# Patient Record
Sex: FEMALE | Race: ASIAN | HISPANIC OR LATINO | Employment: FULL TIME | URBAN - METROPOLITAN AREA
[De-identification: names, ages, dates, MRNs, and addresses within clinical notes are randomized per-mention and may not be internally consistent; named-entity substitution may affect disease eponyms.]

---

## 2017-02-16 ENCOUNTER — TELEPHONE (OUTPATIENT)
Dept: VASCULAR LAB | Facility: MEDICAL CENTER | Age: 27
End: 2017-02-16

## 2017-02-16 NOTE — TELEPHONE ENCOUNTER
[facility]    Left a voicemail to remind pt to obtain next INR.    Taj Allen, PHARMD

## 2017-03-02 DIAGNOSIS — I48.91 ATRIAL FIBRILLATION, UNSPECIFIED TYPE (HCC): ICD-10-CM

## 2017-03-04 ENCOUNTER — HOSPITAL ENCOUNTER (OUTPATIENT)
Dept: LAB | Facility: MEDICAL CENTER | Age: 27
End: 2017-03-04
Attending: FAMILY MEDICINE
Payer: COMMERCIAL

## 2017-03-04 ENCOUNTER — HOSPITAL ENCOUNTER (OUTPATIENT)
Dept: LAB | Facility: MEDICAL CENTER | Age: 27
End: 2017-03-04
Attending: NURSE PRACTITIONER
Payer: COMMERCIAL

## 2017-03-04 LAB
25(OH)D3 SERPL-MCNC: 18 NG/ML (ref 30–100)
ALBUMIN SERPL BCP-MCNC: 4.2 G/DL (ref 3.2–4.9)
ALBUMIN/GLOB SERPL: 0.9 G/DL
ALP SERPL-CCNC: 83 U/L (ref 30–99)
ALT SERPL-CCNC: 18 U/L (ref 2–50)
ANION GAP SERPL CALC-SCNC: 7 MMOL/L (ref 0–11.9)
AST SERPL-CCNC: 25 U/L (ref 12–45)
BILIRUB SERPL-MCNC: 0.7 MG/DL (ref 0.1–1.5)
BUN SERPL-MCNC: 6 MG/DL (ref 8–22)
CALCIUM SERPL-MCNC: 9.7 MG/DL (ref 8.5–10.5)
CHLORIDE SERPL-SCNC: 104 MMOL/L (ref 96–112)
CO2 SERPL-SCNC: 26 MMOL/L (ref 20–33)
CREAT SERPL-MCNC: 0.66 MG/DL (ref 0.5–1.4)
ERYTHROCYTE [DISTWIDTH] IN BLOOD BY AUTOMATED COUNT: 45.5 FL (ref 35.9–50)
EST. AVERAGE GLUCOSE BLD GHB EST-MCNC: 114 MG/DL
GLOBULIN SER CALC-MCNC: 4.7 G/DL (ref 1.9–3.5)
GLUCOSE SERPL-MCNC: 82 MG/DL (ref 65–99)
HBA1C MFR BLD: 5.6 % (ref 0–5.6)
HCT VFR BLD AUTO: 43.1 % (ref 37–47)
HGB BLD-MCNC: 13.6 G/DL (ref 12–16)
MCH RBC QN AUTO: 28.9 PG (ref 27–33)
MCHC RBC AUTO-ENTMCNC: 31.6 G/DL (ref 33.6–35)
MCV RBC AUTO: 91.7 FL (ref 81.4–97.8)
PLATELET # BLD AUTO: 284 K/UL (ref 164–446)
PMV BLD AUTO: 10.6 FL (ref 9–12.9)
POTASSIUM SERPL-SCNC: 3.9 MMOL/L (ref 3.6–5.5)
PROT SERPL-MCNC: 8.9 G/DL (ref 6–8.2)
RBC # BLD AUTO: 4.7 M/UL (ref 4.2–5.4)
SODIUM SERPL-SCNC: 137 MMOL/L (ref 135–145)
TSH SERPL DL<=0.005 MIU/L-ACNC: 1.05 UIU/ML (ref 0.3–3.7)
WBC # BLD AUTO: 5.2 K/UL (ref 4.8–10.8)

## 2017-03-04 PROCEDURE — 83695 ASSAY OF LIPOPROTEIN(A): CPT

## 2017-03-04 PROCEDURE — 36415 COLL VENOUS BLD VENIPUNCTURE: CPT

## 2017-03-04 PROCEDURE — 83704 LIPOPROTEIN BLD QUAN PART: CPT

## 2017-03-04 PROCEDURE — 80053 COMPREHEN METABOLIC PANEL: CPT

## 2017-03-04 PROCEDURE — 83036 HEMOGLOBIN GLYCOSYLATED A1C: CPT

## 2017-03-04 PROCEDURE — 84443 ASSAY THYROID STIM HORMONE: CPT

## 2017-03-04 PROCEDURE — 85027 COMPLETE CBC AUTOMATED: CPT

## 2017-03-04 PROCEDURE — 82306 VITAMIN D 25 HYDROXY: CPT

## 2017-03-04 PROCEDURE — 80061 LIPID PANEL: CPT

## 2017-03-06 LAB — LPA SERPL-MCNC: 149 MG/DL

## 2017-03-08 LAB
CHOLEST SERPL-MCNC: 164 MG/DL (ref 100–199)
HDL PARTICAL NO Q4363: 25.1 UMOL/L
HDL SERPL QN: 9.4 NM
HDLC SERPL-MCNC: 46 MG/DL
HLD.LARGE SERPL-SCNC: 6.4 UMOL/L
LDL MED SERPL QN: 22 NM
LDL SERPL QN: 22 NM
LDL SERPL-SCNC: 975 NMOL/L
LDL SMALL SERPL-SCNC: 155 NMOL/L
LDL SMALL SERPL-SCNC: 155 NMOL/L
LDLC SERPL CALC-MCNC: 102 MG/DL (ref 0–99)
LP IR SCORE Q4364: 26
TRIGL SERPL-MCNC: 78 MG/DL (ref 0–149)
VLDL LARGE SERPL-SCNC: 2.2 NMOL/L
VLDL SERPL QN: 42.1 NM

## 2017-03-09 ENCOUNTER — TELEPHONE (OUTPATIENT)
Dept: VASCULAR LAB | Facility: MEDICAL CENTER | Age: 27
End: 2017-03-09

## 2017-03-10 ENCOUNTER — HOSPITAL ENCOUNTER (OUTPATIENT)
Dept: LAB | Facility: MEDICAL CENTER | Age: 27
End: 2017-03-10
Attending: NURSE PRACTITIONER
Payer: COMMERCIAL

## 2017-03-10 DIAGNOSIS — I48.0 PAROXYSMAL ATRIAL FIBRILLATION (HCC): ICD-10-CM

## 2017-03-10 LAB
INR PPP: 3.55 (ref 0.87–1.13)
PROTHROMBIN TIME: 36.6 SEC (ref 12–14.6)

## 2017-03-10 PROCEDURE — 85610 PROTHROMBIN TIME: CPT

## 2017-03-10 PROCEDURE — 36415 COLL VENOUS BLD VENIPUNCTURE: CPT

## 2017-04-22 ENCOUNTER — HOSPITAL ENCOUNTER (OUTPATIENT)
Dept: LAB | Facility: MEDICAL CENTER | Age: 27
End: 2017-04-22
Attending: NURSE PRACTITIONER
Payer: COMMERCIAL

## 2017-04-22 DIAGNOSIS — I48.0 PAROXYSMAL ATRIAL FIBRILLATION (HCC): ICD-10-CM

## 2017-04-22 LAB
INR PPP: 3.7 (ref 0.87–1.13)
PROTHROMBIN TIME: 37.8 SEC (ref 12–14.6)

## 2017-04-22 PROCEDURE — 85610 PROTHROMBIN TIME: CPT

## 2017-04-22 PROCEDURE — 36415 COLL VENOUS BLD VENIPUNCTURE: CPT

## 2017-04-24 ENCOUNTER — ANTICOAGULATION MONITORING (OUTPATIENT)
Dept: VASCULAR LAB | Facility: MEDICAL CENTER | Age: 27
End: 2017-04-24

## 2017-04-24 DIAGNOSIS — I48.0 PAROXYSMAL ATRIAL FIBRILLATION (HCC): ICD-10-CM

## 2017-04-24 NOTE — PROGRESS NOTES
Anticoagulation Summary as of 4/24/2017     INR goal 2.5-3.5   Selected INR 3.70! (4/22/2017)   Maintenance plan 2.5 mg (5 mg x 0.5) on Mon, Wed, Sat; 5 mg (5 mg x 1) all other days   Weekly total 27.5 mg   Plan last modified Taj Allen, PHARMD (4/24/2017)   Next INR check 5/8/2017   Target end date Indefinite    Indications   Paroxysmal atrial fibrillation (CMS-HCC) [I48.0]  Mitral stenosis s/p Mechanical MVR [I05.0]         Anticoagulation Episode Summary     INR check location Outside Lab    Preferred lab     Send INR reminders to     [facility]       Anticoagulation Care Providers     Provider Role Specialty Phone number    Bird Rodriguez D.O. Referring Family Medicine [phone]    Trey Dubon M.D. Referring Cardiac Surgery [phone]    Taj Allen, PHARMD Responsible          Anticoagulation Patient Findings    Left voicemail message to report a SUPRA therapeutic INR of 3.7.  Pt to reduce current warfarin dosing regimen. Requested pt contact the clinic for any s/s of unusual bleeding, bruising, clotting or any changes to diet or medication. FU 2 weeks.    Taj Allen, PHARMD

## 2017-05-06 ENCOUNTER — HOSPITAL ENCOUNTER (OUTPATIENT)
Dept: LAB | Facility: MEDICAL CENTER | Age: 27
End: 2017-05-06
Attending: NURSE PRACTITIONER
Payer: COMMERCIAL

## 2017-05-06 ENCOUNTER — APPOINTMENT (OUTPATIENT)
Dept: LAB | Facility: MEDICAL CENTER | Age: 27
End: 2017-05-06
Payer: COMMERCIAL

## 2017-05-06 DIAGNOSIS — I48.0 PAROXYSMAL ATRIAL FIBRILLATION (HCC): ICD-10-CM

## 2017-05-06 LAB
INR PPP: 2.28 (ref 0.87–1.13)
INR PPP: 2.28 (ref 2–3.5)
PROTHROMBIN TIME: 25.8 SEC (ref 12–14.6)

## 2017-05-06 PROCEDURE — 85610 PROTHROMBIN TIME: CPT

## 2017-05-06 PROCEDURE — 36415 COLL VENOUS BLD VENIPUNCTURE: CPT

## 2017-05-09 ENCOUNTER — ANTICOAGULATION MONITORING (OUTPATIENT)
Dept: VASCULAR LAB | Facility: MEDICAL CENTER | Age: 27
End: 2017-05-09

## 2017-05-09 DIAGNOSIS — I34.2 NON-RHEUMATIC MITRAL VALVE STENOSIS: ICD-10-CM

## 2017-05-09 DIAGNOSIS — I48.0 PAROXYSMAL ATRIAL FIBRILLATION (HCC): ICD-10-CM

## 2017-05-09 NOTE — PROGRESS NOTES
Anticoagulation Summary as of 5/9/2017     INR goal 2.5-3.5   Selected INR 2.28! (5/6/2017)   Maintenance plan 2.5 mg (5 mg x 0.5) on Mon, Wed, Sat; 5 mg (5 mg x 1) all other days   Weekly total 27.5 mg   Plan last modified Taj Allen, PHARMD (4/24/2017)   Next INR check    Target end date Indefinite    Indications   Paroxysmal atrial fibrillation (CMS-HCC) [I48.0]  Mitral stenosis s/p Mechanical MVR [I05.0]         Anticoagulation Episode Summary     INR check location Outside Lab    Preferred lab     Send INR reminders to     [facility]       Anticoagulation Care Providers     Provider Role Specialty Phone number    Bird Rodriguez D.O. Referring Family Medicine [phone]    Trey Dubon M.D. Referring Cardiac Surgery [phone]    Taj Allen, PHARMD Responsible          Anticoagulation Patient Findings    Unable to leave voicemail dosing instructions - the voicemailbox is full  Nina Membreno, ILANA

## 2017-05-11 ENCOUNTER — ANTICOAGULATION MONITORING (OUTPATIENT)
Dept: VASCULAR LAB | Facility: MEDICAL CENTER | Age: 27
End: 2017-05-11

## 2017-05-11 DIAGNOSIS — I48.0 PAF (PAROXYSMAL ATRIAL FIBRILLATION) (HCC): ICD-10-CM

## 2017-05-11 DIAGNOSIS — I48.0 PAROXYSMAL ATRIAL FIBRILLATION (HCC): ICD-10-CM

## 2017-05-11 DIAGNOSIS — I05.0 RHEUMATIC MITRAL STENOSIS: ICD-10-CM

## 2017-05-11 RX ORDER — METOPROLOL SUCCINATE 25 MG/1
25 TABLET, EXTENDED RELEASE ORAL
Qty: 30 TAB | Refills: 11 | Status: SHIPPED | OUTPATIENT
Start: 2017-05-11 | End: 2017-08-18 | Stop reason: SDUPTHER

## 2017-05-11 NOTE — PROGRESS NOTES
Anticoagulation Summary as of 5/11/2017     INR goal 2.5-3.5   Selected INR 2.28! (5/6/2017)   Maintenance plan 2.5 mg (5 mg x 0.5) on Mon, Wed, Sat; 5 mg (5 mg x 1) all other days   Weekly total 27.5 mg   Plan last modified Taj Allen, PHARMD (4/24/2017)   Next INR check 5/25/2017   Target end date Indefinite    Indications   Paroxysmal atrial fibrillation (CMS-HCC) [I48.0]  Mitral stenosis s/p Mechanical MVR [I05.0]         Anticoagulation Episode Summary     INR check location Outside Lab    Preferred lab     Send INR reminders to     [facility]       Anticoagulation Care Providers     Provider Role Specialty Phone number    Bird Rodriguez D.O. Referring Family Medicine [phone]    Trey Dubon M.D. Referring Cardiac Surgery [phone]    Taj Allen, PHARMD Responsible          Anticoagulation Patient Findings    Spoke to patient on phone. No current signs of bleeding. No interval medication changes. Increase dose of warfarin to 7.5 mg today only. Follow up INR in 2 weeks.    Mohan Menon, PHARMD

## 2017-06-01 ENCOUNTER — TELEPHONE (OUTPATIENT)
Dept: VASCULAR LAB | Facility: MEDICAL CENTER | Age: 27
End: 2017-06-01

## 2017-06-14 ENCOUNTER — TELEPHONE (OUTPATIENT)
Dept: VASCULAR LAB | Facility: MEDICAL CENTER | Age: 27
End: 2017-06-14

## 2017-06-14 NOTE — TELEPHONE ENCOUNTER
[facility]    Pt states she will obtain INR at earliest convenience.    Taj Allen, PHARMD

## 2017-06-16 ENCOUNTER — HOSPITAL ENCOUNTER (OUTPATIENT)
Dept: LAB | Facility: MEDICAL CENTER | Age: 27
End: 2017-06-16
Attending: NURSE PRACTITIONER
Payer: COMMERCIAL

## 2017-06-16 DIAGNOSIS — I48.0 PAROXYSMAL ATRIAL FIBRILLATION (HCC): ICD-10-CM

## 2017-06-16 LAB
INR PPP: 3 (ref 0.87–1.13)
PROTHROMBIN TIME: 32.1 SEC (ref 12–14.6)

## 2017-06-16 PROCEDURE — 36415 COLL VENOUS BLD VENIPUNCTURE: CPT

## 2017-06-16 PROCEDURE — 85610 PROTHROMBIN TIME: CPT

## 2017-06-19 ENCOUNTER — ANTICOAGULATION MONITORING (OUTPATIENT)
Dept: VASCULAR LAB | Facility: MEDICAL CENTER | Age: 27
End: 2017-06-19

## 2017-06-19 DIAGNOSIS — I48.0 PAROXYSMAL ATRIAL FIBRILLATION (HCC): ICD-10-CM

## 2017-06-19 DIAGNOSIS — I05.0 RHEUMATIC MITRAL STENOSIS: ICD-10-CM

## 2017-06-19 NOTE — PROGRESS NOTES
OP Anticoagulation Telephone Note    Date: 6/19/2017  Anticoagulation Summary as of 6/19/2017     INR goal 2.5-3.5   Selected INR 3.00 (6/16/2017)   Maintenance plan 2.5 mg (5 mg x 0.5) on Mon, Wed, Sat; 5 mg (5 mg x 1) all other days   Weekly total 27.5 mg   No change documented Ratna Gutierrez, Med Ass't   Plan last modified JL SantosD (4/24/2017)   Next INR check 6/30/2017   Target end date Indefinite    Indications   Paroxysmal atrial fibrillation (CMS-[facility]) [I48.0]  Mitral stenosis s/p Mechanical MVR [I05.0]         Anticoagulation Episode Summary     INR check location Outside Lab    Preferred lab     Send INR reminders to     [facility]       Anticoagulation Care Providers     Provider Role Specialty Phone number    Bird Rodriguez D.O. Referring Family Medicine [phone]    Trey Dubon M.D. Referring Cardiac Surgery [phone]    JL SantosD Responsible          Anticoagulation Patient Findings   Negatives Missed Doses, Extra Doses, Medication Changes, Antibiotic Use, Diet Changes, Dental/Other Procedures, Hospitalization, Bleeding Gums, Nose Bleeds, Blood in Urine, Blood in Stool, Any Bruising, Other Complaints      Plan:  Left message on patient's answering machine/ voicemail. Instructed patient to call back with any concerns regarding any unusual bleeding or bruising, any medication or diet changes, or any signs or symptoms of thrombosis. Instructed patient to resume medication as outlined above. Patient to follow up in 2 weeks.     Ratna Gutierrez, Medical Assistant    I have reviewed and agree with the plan above on 06/21/2017      Nina Membreno, JLD

## 2017-06-30 DIAGNOSIS — E78.00 HYPERCHOLESTEROLEMIA: ICD-10-CM

## 2017-06-30 RX ORDER — ATORVASTATIN CALCIUM 40 MG/1
40 TABLET, FILM COATED ORAL EVERY EVENING
Qty: 30 TAB | Refills: 1 | OUTPATIENT
Start: 2017-06-30 | End: 2017-08-18 | Stop reason: SDUPTHER

## 2017-07-13 ENCOUNTER — HOSPITAL ENCOUNTER (OUTPATIENT)
Dept: LAB | Facility: MEDICAL CENTER | Age: 27
End: 2017-07-13
Attending: NURSE PRACTITIONER
Payer: COMMERCIAL

## 2017-07-13 LAB
INR PPP: 2.29 (ref 0.87–1.13)
PROTHROMBIN TIME: 25.9 SEC (ref 12–14.6)

## 2017-07-13 PROCEDURE — 85610 PROTHROMBIN TIME: CPT

## 2017-07-13 PROCEDURE — 36415 COLL VENOUS BLD VENIPUNCTURE: CPT

## 2017-07-14 ENCOUNTER — ANTICOAGULATION MONITORING (OUTPATIENT)
Dept: VASCULAR LAB | Facility: MEDICAL CENTER | Age: 27
End: 2017-07-14

## 2017-07-14 ENCOUNTER — TELEPHONE (OUTPATIENT)
Dept: VASCULAR LAB | Facility: MEDICAL CENTER | Age: 27
End: 2017-07-14

## 2017-07-14 DIAGNOSIS — I48.0 PAROXYSMAL ATRIAL FIBRILLATION (HCC): ICD-10-CM

## 2017-07-14 NOTE — PROGRESS NOTES
Anticoagulation Summary as of 7/14/2017     INR goal 2.5-3.5   Selected INR 2.29! (7/13/2017)   Maintenance plan 2.5 mg (5 mg x 0.5) on Mon, Wed, Sat; 5 mg (5 mg x 1) all other days   Weekly total 27.5 mg   Plan last modified Taj Allen, PHARMD (4/24/2017)   Next INR check 7/27/2017   Target end date Indefinite    Indications   Paroxysmal atrial fibrillation (CMS-HCC) [I48.0]  Mitral stenosis s/p Mechanical MVR [I05.0]         Anticoagulation Episode Summary     INR check location Outside Lab    Preferred lab     Send INR reminders to     [facility]       Anticoagulation Care Providers     Provider Role Specialty Phone number    Bird Rodriguez D.O. Referring Family Medicine [phone]    Trey Dubon M.D. Referring Cardiac Surgery [phone]    Taj Allen, PHARMD Responsible          Anticoagulation Patient Findings    Left voicemail message to report a SUB therapeutic INR of 2.3.  Pt to bolus today then continue with current warfarin dosing regimen. Requested pt contact the clinic for any s/s of unusual bleeding, bruising, clotting or any changes to diet or medication. FU 2 weeks.    Taj Allen, PHARMD

## 2017-07-24 ENCOUNTER — HOSPITAL ENCOUNTER (OUTPATIENT)
Dept: LAB | Facility: MEDICAL CENTER | Age: 27
End: 2017-07-24
Attending: NURSE PRACTITIONER
Payer: COMMERCIAL

## 2017-07-24 DIAGNOSIS — I48.0 PAROXYSMAL ATRIAL FIBRILLATION (HCC): ICD-10-CM

## 2017-07-24 LAB
INR PPP: 2.1 (ref 0.87–1.13)
INR PPP: 2.1 (ref 2–3.5)
PROTHROMBIN TIME: 24.2 SEC (ref 12–14.6)

## 2017-07-24 PROCEDURE — 85610 PROTHROMBIN TIME: CPT

## 2017-07-24 PROCEDURE — 36415 COLL VENOUS BLD VENIPUNCTURE: CPT

## 2017-07-25 ENCOUNTER — ANTICOAGULATION MONITORING (OUTPATIENT)
Dept: VASCULAR LAB | Facility: MEDICAL CENTER | Age: 27
End: 2017-07-25

## 2017-07-25 DIAGNOSIS — I48.0 PAROXYSMAL ATRIAL FIBRILLATION (HCC): ICD-10-CM

## 2017-07-25 DIAGNOSIS — I05.0 MITRAL VALVE STENOSIS, UNSPECIFIED ETIOLOGY: ICD-10-CM

## 2017-07-25 NOTE — PROGRESS NOTES
Anticoagulation Summary as of 7/25/2017     INR goal 2.5-3.5   Selected INR 2.10! (7/24/2017)   Maintenance plan 2.5 mg (5 mg x 0.5) on Mon, Wed; 5 mg (5 mg x 1) all other days   Weekly total 30 mg   Plan last modified Onelia Pham (7/25/2017)   Next INR check 8/4/2017   Target end date Indefinite    Indications   Paroxysmal atrial fibrillation (CMS-HCC) [I48.0]  Mitral stenosis s/p Mechanical MVR [I05.0]         Anticoagulation Episode Summary     INR check location Outside Lab    Preferred lab     Send INR reminders to     [facility]       Anticoagulation Care Providers     Provider Role Specialty Phone number    Bird Rodriguez D.O. Referring Family Medicine [phone]    Trey Dubon M.D. Referring Cardiac Surgery [phone]    Taj Allen, PHARMD Responsible          Anticoagulation Patient Findings   Positives Diet Changes    Negatives Missed Doses, Extra Doses, Medication Changes, Antibiotic Use, Dental/Other Procedures, Hospitalization, Bleeding Gums, Nose Bleeds, Blood in Urine, Blood in Stool, Any Bruising, Other Complaints    Comments Patient trying to eat healthier        Spoke with the patient on the phone today, reporting a SUB-therapeutic INR of 2.10. Confirmed the current warfarin dosing regimen and patient compliance. Patient has been trying to eat healthier, which includes more salad and veggies. Patient denies any interval changes to her other medications & denies any signs/symptoms of bleeding or clotting.  Instructed the patient to take an additional 2.5mg along with her normal dose (for a total of 7.5mg) TONIGHT ONLY, then to begin increased weekly regimen (~9%) of 2.5mg on Mon & Wed and 5mg ROW.   Patient will follow up in about 10 days (per request).    Elizabeth Pham  PharmD

## 2017-07-26 ENCOUNTER — ANTICOAGULATION MONITORING (OUTPATIENT)
Dept: VASCULAR LAB | Facility: MEDICAL CENTER | Age: 27
End: 2017-07-26

## 2017-07-26 DIAGNOSIS — I05.0 MITRAL VALVE STENOSIS, UNSPECIFIED ETIOLOGY: ICD-10-CM

## 2017-07-26 DIAGNOSIS — I48.0 PAROXYSMAL ATRIAL FIBRILLATION (HCC): ICD-10-CM

## 2017-07-26 LAB — INR PPP: 2.1 (ref 2–3.5)

## 2017-08-04 ENCOUNTER — HOSPITAL ENCOUNTER (OUTPATIENT)
Dept: LAB | Facility: MEDICAL CENTER | Age: 27
End: 2017-08-04
Attending: NURSE PRACTITIONER
Payer: COMMERCIAL

## 2017-08-04 LAB
INR PPP: 1.65 (ref 0.87–1.13)
PROTHROMBIN TIME: 20 SEC (ref 12–14.6)

## 2017-08-04 PROCEDURE — 85610 PROTHROMBIN TIME: CPT

## 2017-08-04 PROCEDURE — 36415 COLL VENOUS BLD VENIPUNCTURE: CPT

## 2017-08-07 ENCOUNTER — ANTICOAGULATION MONITORING (OUTPATIENT)
Dept: VASCULAR LAB | Facility: MEDICAL CENTER | Age: 27
End: 2017-08-07

## 2017-08-07 ENCOUNTER — TELEPHONE (OUTPATIENT)
Dept: MEDICAL GROUP | Facility: MEDICAL CENTER | Age: 27
End: 2017-08-07

## 2017-08-07 DIAGNOSIS — R73.01 IMPAIRED FASTING GLUCOSE: ICD-10-CM

## 2017-08-07 DIAGNOSIS — E55.9 VITAMIN D DEFICIENCY: ICD-10-CM

## 2017-08-07 DIAGNOSIS — E78.5 HYPERLIPIDEMIA LDL GOAL <70: ICD-10-CM

## 2017-08-07 DIAGNOSIS — Z95.0 PACEMAKER: ICD-10-CM

## 2017-08-07 DIAGNOSIS — R77.1 ABNORMALITY OF GLOBULIN: ICD-10-CM

## 2017-08-07 DIAGNOSIS — I05.0 MITRAL VALVE STENOSIS, UNSPECIFIED ETIOLOGY: ICD-10-CM

## 2017-08-07 DIAGNOSIS — E78.00 HYPERCHOLESTEROLEMIA: ICD-10-CM

## 2017-08-07 DIAGNOSIS — I48.0 PAROXYSMAL ATRIAL FIBRILLATION (HCC): ICD-10-CM

## 2017-08-07 NOTE — Clinical Note
August 7, 2017        Sameer Boston  [address]  [place] [facility] [place] [address]        Dear Sameer:    We have received a request from your pharmacy to refill your prescription(s). After careful review of your chart, we have noted you are due for labs and a follow up appointment.  We request you call our office at [phone] at your earliest convenience and make an appointment. I have included a fasting lab order for you.    However, when patients are not followed closely by their physician, potential medication complications may go unadressed. We look forward to scheduling an appointment for you, so that we may provide you with the safest and most complete medical care.        If you have any questions or concerns, please don't hesitate to call.        Sincerely,        MARCIA Barrientos.    Electronically Signed

## 2017-08-07 NOTE — PROGRESS NOTES
Anticoagulation Summary as of 8/7/2017     INR goal 2.5-3.5   Selected INR 1.65! (8/4/2017)   Maintenance plan 2.5 mg (5 mg x 0.5) on Mon, Wed; 5 mg (5 mg x 1) all other days   Weekly total 30 mg   Plan last modified Onelia Pham (7/25/2017)   Next INR check 8/14/2017   Target end date Indefinite    Indications   Paroxysmal atrial fibrillation (CMS-HCC) [I48.0]  Mitral stenosis s/p Mechanical MVR [I05.0]         Anticoagulation Episode Summary     INR check location Outside Lab    Preferred lab     Send INR reminders to     [facility]       Anticoagulation Care Providers     Provider Role Specialty Phone number    Bird Rodriguez D.O. Referring Family Medicine [phone]    Trey Dubon M.D. Referring Cardiac Surgery [phone]    Taj Allen, PHARMD Responsible          Anticoagulation Patient Findings    Left message with patient. Instructed patient to call back with any concerns regarding bleeding or clotting, medication or diet changes. Instructed pt to increase tonight's dose and increase weekly dosage.  Recommended bridging with Lovenox as well since she has been consistently low and has a mitral valve replacement.  Asked pt to return call for instructions.  Pt to follow up in 1 week.    Lala CASTILLO

## 2017-08-07 NOTE — PROGRESS NOTES
Pt returned call regarding bridging with Lovenox due to her history of a mitral mechanical valve.  She states her weight is 130lbs (59 Kg).  Ordered Lovenox injections 1.5mg/kg, 80mg once daily.  Instructed pt on use.  She understands to increase her warfarin dose as instructed and continue Lovenox until instructed to stop by our office.  Pt will recheck on Friday.    Lala CASTILLO

## 2017-08-12 ENCOUNTER — HOSPITAL ENCOUNTER (OUTPATIENT)
Dept: LAB | Facility: MEDICAL CENTER | Age: 27
End: 2017-08-12
Attending: NURSE PRACTITIONER
Payer: COMMERCIAL

## 2017-08-12 ENCOUNTER — HOSPITAL ENCOUNTER (OUTPATIENT)
Dept: LAB | Facility: MEDICAL CENTER | Age: 27
End: 2017-08-12
Payer: COMMERCIAL

## 2017-08-12 DIAGNOSIS — Z95.0 PACEMAKER: ICD-10-CM

## 2017-08-12 DIAGNOSIS — E55.9 VITAMIN D DEFICIENCY: ICD-10-CM

## 2017-08-12 DIAGNOSIS — E78.5 HYPERLIPIDEMIA LDL GOAL <70: ICD-10-CM

## 2017-08-12 DIAGNOSIS — R73.01 IMPAIRED FASTING GLUCOSE: ICD-10-CM

## 2017-08-12 DIAGNOSIS — E78.00 HYPERCHOLESTEROLEMIA: ICD-10-CM

## 2017-08-12 DIAGNOSIS — R77.1 ABNORMALITY OF GLOBULIN: ICD-10-CM

## 2017-08-12 LAB
25(OH)D3 SERPL-MCNC: 35 NG/ML (ref 30–100)
ALBUMIN SERPL BCP-MCNC: 4.2 G/DL (ref 3.2–4.9)
ALBUMIN/GLOB SERPL: 1.2 G/DL
ALP SERPL-CCNC: 60 U/L (ref 30–99)
ALT SERPL-CCNC: 15 U/L (ref 2–50)
ANION GAP SERPL CALC-SCNC: 9 MMOL/L (ref 0–11.9)
AST SERPL-CCNC: 22 U/L (ref 12–45)
BDY FAT % MEASURED: NORMAL %
BILIRUB SERPL-MCNC: 0.4 MG/DL (ref 0.1–1.5)
BP DIAS: 69 MMHG
BP SYS: 94 MMHG
BUN SERPL-MCNC: 9 MG/DL (ref 8–22)
CALCIUM SERPL-MCNC: 9.4 MG/DL (ref 8.5–10.5)
CHLORIDE SERPL-SCNC: 106 MMOL/L (ref 96–112)
CHOLEST SERPL-MCNC: 192 MG/DL (ref 100–199)
CHOLEST SERPL-MCNC: 194 MG/DL (ref 100–199)
CO2 SERPL-SCNC: 23 MMOL/L (ref 20–33)
CREAT SERPL-MCNC: 0.59 MG/DL (ref 0.5–1.4)
CREAT UR-MCNC: 87.9 MG/DL
DIABETES HTDIA: NO
EST. AVERAGE GLUCOSE BLD GHB EST-MCNC: 103 MG/DL
EVENT NAME HTEVT: NORMAL
FASTING HTFAS: YES
GFR SERPL CREATININE-BSD FRML MDRD: >60 ML/MIN/1.73 M 2
GLOBULIN SER CALC-MCNC: 3.6 G/DL (ref 1.9–3.5)
GLUCOSE SERPL-MCNC: 75 MG/DL (ref 65–99)
GLUCOSE SERPL-MCNC: 76 MG/DL (ref 65–99)
HBA1C MFR BLD: 5.2 % (ref 0–5.6)
HDLC SERPL-MCNC: 54 MG/DL
HDLC SERPL-MCNC: 55 MG/DL
HYPERTENSION HTHYP: NO
INR PPP: 1.8 (ref 0.87–1.13)
LDLC SERPL CALC-MCNC: 122 MG/DL
LDLC SERPL CALC-MCNC: 125 MG/DL
MICROALBUMIN UR-MCNC: <0.7 MG/DL
MICROALBUMIN/CREAT UR: NORMAL MG/G (ref 0–30)
POTASSIUM SERPL-SCNC: 3.7 MMOL/L (ref 3.6–5.5)
PROT SERPL-MCNC: 7.8 G/DL (ref 6–8.2)
PROTHROMBIN TIME: 21.4 SEC (ref 12–14.6)
SCREENING LOC CITY HTCIT: NORMAL
SCREENING LOC STATE HTSTA: NORMAL
SCREENING LOCATION HTLOC: NORMAL
SMOKING HTSMO: NO
SODIUM SERPL-SCNC: 138 MMOL/L (ref 135–145)
SUBSCRIBER ID HTSID: NORMAL
TRIGL SERPL-MCNC: 76 MG/DL (ref 0–149)
TRIGL SERPL-MCNC: 77 MG/DL (ref 0–149)

## 2017-08-12 PROCEDURE — 85610 PROTHROMBIN TIME: CPT

## 2017-08-12 PROCEDURE — 82784 ASSAY IGA/IGD/IGG/IGM EACH: CPT

## 2017-08-12 PROCEDURE — 82306 VITAMIN D 25 HYDROXY: CPT

## 2017-08-12 PROCEDURE — 82784 ASSAY IGA/IGD/IGG/IGM EACH: CPT | Mod: 91

## 2017-08-12 PROCEDURE — 82947 ASSAY GLUCOSE BLOOD QUANT: CPT

## 2017-08-12 PROCEDURE — 36415 COLL VENOUS BLD VENIPUNCTURE: CPT

## 2017-08-12 PROCEDURE — 82570 ASSAY OF URINE CREATININE: CPT

## 2017-08-12 PROCEDURE — 80053 COMPREHEN METABOLIC PANEL: CPT

## 2017-08-12 PROCEDURE — S5190 WELLNESS ASSESSMENT BY NONPH: HCPCS

## 2017-08-12 PROCEDURE — 80061 LIPID PANEL: CPT

## 2017-08-12 PROCEDURE — 84165 PROTEIN E-PHORESIS SERUM: CPT

## 2017-08-12 PROCEDURE — 83883 ASSAY NEPHELOMETRY NOT SPEC: CPT

## 2017-08-12 PROCEDURE — 83036 HEMOGLOBIN GLYCOSYLATED A1C: CPT

## 2017-08-12 PROCEDURE — 80061 LIPID PANEL: CPT | Mod: 91

## 2017-08-12 PROCEDURE — 84160 ASSAY OF PROTEIN ANY SOURCE: CPT

## 2017-08-12 PROCEDURE — 82043 UR ALBUMIN QUANTITATIVE: CPT

## 2017-08-14 ENCOUNTER — ANTICOAGULATION MONITORING (OUTPATIENT)
Dept: VASCULAR LAB | Facility: MEDICAL CENTER | Age: 27
End: 2017-08-14

## 2017-08-14 ENCOUNTER — TELEPHONE (OUTPATIENT)
Dept: MEDICAL GROUP | Facility: MEDICAL CENTER | Age: 27
End: 2017-08-14

## 2017-08-14 DIAGNOSIS — I48.0 PAROXYSMAL ATRIAL FIBRILLATION (HCC): ICD-10-CM

## 2017-08-14 LAB
DEPRECATED KAPPA LC FREE/LAMBDA SER: 1.2 {RATIO} (ref 0.26–1.65)
IGA SERPL-MCNC: 450 MG/DL (ref 68–408)
IGG SERPL-MCNC: 1720 MG/DL (ref 768–1632)
IGM SERPL-MCNC: 177 MG/DL (ref 35–263)
KAPPA LC FREE SER-MCNC: 2.26 MG/DL (ref 0.33–1.94)
LAMBDA LC FREE SERPL-MCNC: 1.89 MG/DL (ref 0.57–2.63)

## 2017-08-14 NOTE — TELEPHONE ENCOUNTER
Please let pt know that the LP is about the same as it was last time.  Dec fat and cholesterol in diet.  Inc exercise.

## 2017-08-14 NOTE — PROGRESS NOTES
Anticoagulation Summary as of 8/14/2017     INR goal 2.5-3.5   Selected INR 1.80! (8/12/2017)   Maintenance plan 2.5 mg (5 mg x 0.5) on Mon; 5 mg (5 mg x 1) all other days   Weekly total 32.5 mg   Plan last modified Lala Thomson A.P.NHerminio (8/7/2017)   Next INR check 8/17/2017   Target end date Indefinite    Indications   Paroxysmal atrial fibrillation (CMS-HCC) [I48.0]  Mitral stenosis s/p Mechanical MVR [I05.0]         Anticoagulation Episode Summary     INR check location Outside Lab    Preferred lab     Send INR reminders to     [facility]       Anticoagulation Care Providers     Provider Role Specialty Phone number    Bird Rodriguez D.O. Referring Family Medicine [phone]    Trey Dubon M.D. Referring Cardiac Surgery [phone]    Taj Allen, PHARMD Responsible          Anticoagulation Patient Findings    Pt's INR is SUBtherapeutic.  Confirmed dosing regimen.  Continue enoxaparin.  Pt denies any unusual s/s of bleeding, bruising, clotting or any changes to diet or medications.  Bolus 7.45 mg x 3 days.    Follow up in 3 days.    Taj Allen, PHARMD

## 2017-08-15 ENCOUNTER — TELEPHONE (OUTPATIENT)
Dept: MEDICAL GROUP | Facility: MEDICAL CENTER | Age: 27
End: 2017-08-15

## 2017-08-15 LAB
ALBUMIN SERPL-MCNC: 4.33 G/DL (ref 3.75–5.01)
ALPHA1 GLOB SERPL ELPH-MCNC: 0.29 G/DL (ref 0.19–0.46)
ALPHA2 GLOB SERPL ELPH-MCNC: 0.62 G/DL (ref 0.48–1.05)
B-GLOBULIN SERPL ELPH-MCNC: 1.03 G/DL (ref 0.48–1.1)
EER PROT ELECT SER Q1092: ABNORMAL
GAMMA GLOB SERPL ELPH-MCNC: 1.62 G/DL (ref 0.62–1.51)
INTERPRETATION SERPL IFE-IMP: ABNORMAL
PROT SERPL-MCNC: 7.9 G/DL (ref 6–8.3)

## 2017-08-15 NOTE — Clinical Note
August 15, 2017        Sameer Boston  [address]  [place] [facility] [place] [address]        Dear Sameer:    After careful review of your chart, we have noted you are due for a follow up appointment.  We request you call our office at [phone] at your earliest convenience and make an appointment.     We look forward to scheduling an appointment for you, so that we may provide you with the safest and most complete medical care.        If you have any questions or concerns, please don't hesitate to call.        Sincerely,        MARCIA Barrientos.    Electronically Signed

## 2017-08-15 NOTE — TELEPHONE ENCOUNTER
I reviewed the labs and with the 2 elevated immunoglobulins it is highly unlikely to be MGUS or myeloma, but I would repeat labs in 6 months, including an SPEP, immunoglobulins and light chains, serum.    Pearl

## 2017-08-15 NOTE — TELEPHONE ENCOUNTER
----- Message from XAVI Barrientos sent at 8/15/2017 12:14 PM PDT -----  Please have pt set appointment to review and discuss treatment for labs.

## 2017-08-15 NOTE — TELEPHONE ENCOUNTER
----- Message from XAVI Barrientos sent at 8/14/2017  6:40 PM PDT -----  Please have pt set appointment to review and discuss treatment for labs.

## 2017-08-16 ENCOUNTER — HOSPITAL ENCOUNTER (OUTPATIENT)
Dept: LAB | Facility: MEDICAL CENTER | Age: 27
End: 2017-08-16
Attending: NURSE PRACTITIONER
Payer: COMMERCIAL

## 2017-08-16 ENCOUNTER — APPOINTMENT (OUTPATIENT)
Dept: LAB | Facility: MEDICAL CENTER | Age: 27
End: 2017-08-16
Payer: COMMERCIAL

## 2017-08-16 DIAGNOSIS — I48.0 PAROXYSMAL ATRIAL FIBRILLATION (HCC): ICD-10-CM

## 2017-08-16 LAB
INR PPP: 1.4 (ref 0.87–1.13)
PROTHROMBIN TIME: 17.6 SEC (ref 12–14.6)

## 2017-08-16 PROCEDURE — 85610 PROTHROMBIN TIME: CPT

## 2017-08-16 PROCEDURE — 36415 COLL VENOUS BLD VENIPUNCTURE: CPT

## 2017-08-17 ENCOUNTER — ANTICOAGULATION MONITORING (OUTPATIENT)
Dept: VASCULAR LAB | Facility: MEDICAL CENTER | Age: 27
End: 2017-08-17

## 2017-08-17 DIAGNOSIS — I05.0 MITRAL VALVE STENOSIS, UNSPECIFIED ETIOLOGY: ICD-10-CM

## 2017-08-17 DIAGNOSIS — I48.0 PAROXYSMAL ATRIAL FIBRILLATION (HCC): ICD-10-CM

## 2017-08-17 NOTE — PROGRESS NOTES
Anticoagulation Summary as of 8/17/2017     INR goal 2.5-3.5   Selected INR 1.40! (8/16/2017)   Maintenance plan 2.5 mg (5 mg x 0.5) on Mon; 5 mg (5 mg x 1) all other days   Weekly total 32.5 mg   Plan last modified TERI ChaudharyPELOINA (8/7/2017)   Next INR check 8/21/2017   Target end date Indefinite    Indications   Paroxysmal atrial fibrillation (CMS-[facility]) [I48.0]  Mitral stenosis s/p Mechanical MVR [I05.0]         Anticoagulation Episode Summary     INR check location Outside Lab    Preferred lab     Send INR reminders to     [facility]       Anticoagulation Care Providers     Provider Role Specialty Phone number    Bird Rodriguez D.O. Referring Family Medicine [phone]    Trey Dubon M.D. Referring Cardiac Surgery [phone]    Taj Allen, PHARMD Responsible          Anticoagulation Patient Findings      Left voicemail message to report a SUB therapeutic INR of 1.4.    Will have pt continue lovenox injections. Take 15mg x1 today and then 10mg daily until INR on Monday.     Requested pt contact the clinic for any s/s of unusual bleeding, bruising, clotting or any changes to diet or medication.       Ann Medina, PHARMD

## 2017-08-17 NOTE — PROGRESS NOTES
Pt called back, no missed doses, confirmed warfarin dosing.  Pt has been eating more salads and greens. She will continue to do this.     Ann Medina, PHARMD

## 2017-08-18 ENCOUNTER — OFFICE VISIT (OUTPATIENT)
Dept: CARDIOLOGY | Facility: MEDICAL CENTER | Age: 27
End: 2017-08-18
Payer: COMMERCIAL

## 2017-08-18 ENCOUNTER — NON-PROVIDER VISIT (OUTPATIENT)
Dept: CARDIOLOGY | Facility: MEDICAL CENTER | Age: 27
End: 2017-08-18
Payer: COMMERCIAL

## 2017-08-18 VITALS
DIASTOLIC BLOOD PRESSURE: 60 MMHG | RESPIRATION RATE: 12 BRPM | OXYGEN SATURATION: 97 % | WEIGHT: 126 LBS | SYSTOLIC BLOOD PRESSURE: 90 MMHG | HEART RATE: 84 BPM | BODY MASS INDEX: 24.74 KG/M2 | HEIGHT: 60 IN

## 2017-08-18 DIAGNOSIS — I07.1 RHEUMATIC TRICUSPID VALVE REGURGITATION: ICD-10-CM

## 2017-08-18 DIAGNOSIS — Z95.0 PACEMAKER: ICD-10-CM

## 2017-08-18 DIAGNOSIS — I48.0 PAF (PAROXYSMAL ATRIAL FIBRILLATION) (HCC): ICD-10-CM

## 2017-08-18 DIAGNOSIS — Z95.4 S/P MITRAL VALVE REPLACEMENT WITH METALLIC VALVE: ICD-10-CM

## 2017-08-18 DIAGNOSIS — E78.00 HYPERCHOLESTEROLEMIA: ICD-10-CM

## 2017-08-18 PROCEDURE — 99214 OFFICE O/P EST MOD 30 MIN: CPT | Mod: 25 | Performed by: INTERNAL MEDICINE

## 2017-08-18 PROCEDURE — 93280 PM DEVICE PROGR EVAL DUAL: CPT | Performed by: INTERNAL MEDICINE

## 2017-08-18 RX ORDER — ATORVASTATIN CALCIUM 40 MG/1
40 TABLET, FILM COATED ORAL EVERY EVENING
Qty: 30 TAB | Refills: 11 | Status: SHIPPED | OUTPATIENT
Start: 2017-08-18 | End: 2018-01-05 | Stop reason: SDUPTHER

## 2017-08-18 RX ORDER — METOPROLOL SUCCINATE 25 MG/1
25 TABLET, EXTENDED RELEASE ORAL
Qty: 30 TAB | Refills: 11 | Status: SHIPPED | OUTPATIENT
Start: 2017-08-18 | End: 2018-01-05 | Stop reason: SDUPTHER

## 2017-08-18 ASSESSMENT — ENCOUNTER SYMPTOMS
PALPITATIONS: 0
HEMOPTYSIS: 0
FEVER: 0
VOMITING: 0
WEAKNESS: 0
FOCAL WEAKNESS: 0
PND: 0
SHORTNESS OF BREATH: 0
DIZZINESS: 0
ORTHOPNEA: 0
NAUSEA: 0
ABDOMINAL PAIN: 0
MYALGIAS: 0
CLAUDICATION: 0
BLOOD IN STOOL: 0
SPEECH CHANGE: 0

## 2017-08-18 NOTE — MR AVS SNAPSHOT
Sameer Boston   2017 1:40 PM   Office Visit   MRN: [id number]    Department:  [facility]   Dept Phone:  [phone]    Description:  Female : 1990   Provider:  Anna Mahmood M.D.           Reason for Visit     Follow-Up           Allergies as of 2017     Allergen Noted Reactions    No Known Drug Allergy 2011         You were diagnosed with     S/P mitral valve replacement with metallic valve   [096314]       Pacemaker   [990492]       PAF (paroxysmal atrial fibrillation) (CMS-[facility])   [224810]       Hypercholesterolemia   [285331]       Rheumatic tricuspid valve regurgitation   [020964]         Vital Signs     Blood Pressure Pulse Respirations Height Weight Body Mass Index    90/60 mmHg 84 12 1.524 m (5') 57.153 kg (126 lb) 24.61 kg/m2    Oxygen Saturation Smoking Status                97% Never Smoker           Basic Information     Date Of Birth Sex Race Ethnicity Preferred Language    1990 Female   Origin (Bengali,Micronesian,Romanian,North Korean, etc) English      Your appointments     Sep 05, 2017  3:30 PM   Established Patient with XAVI Barrientos   [facility]    [address]  [place] [id number] [phone]           You will be receiving a confirmation call a few days before your appointment from our automated call confirmation system.            2017  2:45 PM   PACER CHECK ONLY with PACER CHECK-[facility]   [facility] (--)    [address]  [place] [id number] [phone]              Problem List              ICD-10-CM Priority Class Noted - Resolved    Cerebral infarction (CMS-HCC) I63.9   2011 - Present    Left atrial thrombus s/p removal and atrial appendage ligation [id number]   2011 - Present    Mitral stenosis s/p Mechanical MVR I05.0   2011 - Present    Anemia D64.9   2011 - Present    Pacemaker [facility]  for 3 degree heart block Z95.0   4/29/2011 - Present    Tricuspid regurgitation s/p repair I07.1   4/29/2011 - Present    Paroxysmal atrial fibrillation (CMS-HCC) I48.0   10/8/2012 - Present    Migraine headache with aura G43.109   Unknown - Present    Chronic anticoagulation Z79.01   7/27/2015 - Present    Hypercholesterolemia E78.00   7/27/2015 - Present    Elbow fracture S42.409A   1/23/2016 - Present    CHF (congestive heart failure) (CMS-HCC) I50.9   Unknown - Present    Pulmonary hypertension (CMS-HCC) I27.2   Unknown - Present      Health Maintenance        Date Due Completion Dates    PAP SMEAR 12/29/2013 12/29/2010    IMM INFLUENZA (1) 9/1/2017 10/21/2016, 11/16/2015, 11/17/2014, 10/31/2011, 4/9/2011    IMM DTaP/Tdap/Td Vaccine (2 - Td) 4/9/2021 4/9/2011            Current Immunizations     Influenza TIV (IM) 10/31/2011, 4/9/2011  4:15 PM    Influenza Vaccine Quad Inj (Pf) 10/21/2016 12:56 PM, 11/16/2015  8:22 AM    Influenza Vaccine Quad Inj (Preserved) 11/17/2014  3:35 PM    Pneumococcal Vaccine (UF)Historical Data 4/4/2011    Tdap Vaccine 4/9/2011  4:15 PM      Below and/or attached are the medications your provider expects you to take. Review all of your home medications and newly ordered medications with your provider and/or pharmacist. Follow medication instructions as directed by your provider and/or pharmacist. Please keep your medication list with you and share with your provider. Update the information when medications are discontinued, doses are changed, or new medications (including over-the-counter products) are added; and carry medication information at all times in the event of emergency situations     Allergies:  NO KNOWN DRUG ALLERGY - (reactions not documented)               Medications  Valid as of: August 18, 2017 -  2:17 PM    Generic Name Brand Name Tablet Size Instructions for use    Atorvastatin Calcium (Tab) LIPITOR 40 MG Take 1 Tab by mouth every evening.        Cholecalciferol  (Cap) Vitamin D 2000 units Take 1 Cap by mouth every day.        Metoprolol Succinate (TABLET SR 24 HR) TOPROL XL 25 MG Take 1 Tab by mouth every bedtime.        Multiple Vitamins-Minerals (Tab) THERAGRAN-M  Take 1 Tab by mouth every day.        SUMAtriptan (Solution) IMITREX 20 MG/ACT Spray 1 Spray in nose as needed for Migraine.        Warfarin Sodium (Tab) COUMADIN 5 MG TAKE ONE-HALF TO ONE TABLET BY MOUTH ONCE DAILY AS DIRECTED BY COUMADIN CLINIC        .                 Medicines prescribed today were sent to:     [facility] [address] [place] [address], [place] - [address] [place] [address]    Phone: [phone] Fax: [phone]    Open 24 Hours?: No      Medication refill instructions:       If your prescription bottle indicates you have medication refills left, it is not necessary to call your provider’s office. Please contact your pharmacy and they will refill your medication.    If your prescription bottle indicates you do not have any refills left, you may request refills at any time through one of the following ways: The online [employer] system (except Urgent Care), by calling your provider’s office, or by asking your pharmacy to contact your provider’s office with a refill request. Medication refills are processed only during regular business hours and may not be available until the next business day. Your provider may request additional information or to have a follow-up visit with you prior to refilling your medication.   *Please Note: Medication refills are assigned a new Rx number when refilled electronically. Your pharmacy may indicate that no refills were authorized even though a new prescription for the same medication is available at the pharmacy. Please request the medicine by name with the pharmacy before contacting your provider for a refill.        Other Notes About Your Plan     [facility]           AXADO Access Code: Activation code not generated  Current AXADO Status: Active

## 2017-08-18 NOTE — Clinical Note
[facility]   [address], [address]  [place] [address]  Phone: [phone]  Fax: [phone]              Sameer Boston  1990    Encounter Date: 8/18/2017    Anna Mahmood M.D.          PROGRESS NOTE:  Subjective:   Sameer Boston is a 27 y.o. female who presents today for f/u MVR, dyslipidemia and pacemaker in situ    The patient is doing well from cardiac standpoint.   Eating differently (more vegetable) to lose weight.  Loss over 20 lbs lately.  Her INR has been low. Currently on enoxaparin.   Shdenies any chest pain, palpitation or heart failure type symptoms.  Denies any side effect from medications.    LDL this week 125    Pacer check functioning well, no sig mode switching, battery now <1.5 year    Past Medical History   Diagnosis Date   • Anemia 4/17/2011   • Migraine      occasional   • CVA (cerebral infarction)    • CHF (congestive heart failure)    • History of atrial fibrillation    • Left atrial thrombus (CMS-HCC)    • Tricuspid regurgitation      s/p repair   • Mitral stenosis      s/p mechanical MVR on coumadin   • Pulmonary hypertension (CMS-HCC)    • Pacemaker      [employer] [place] for 3rd degree AVB   • Hyperlipidemia    • Third degree heart block (CMS-HCC)    • Chronic anticoagulation    • Elbow fracture      Past Surgical History   Procedure Laterality Date   • Primary c section  4/6/2011     Performed by DEBBIE MAXWELL at LABOR AND DELIVERY   • Mitral valve replace  4/25/2011     Performed by RALPH MCNEIL at SURGERY [facility] ORS   • Tricuspid valve repair  4/25/2011     Performed by RALPH MCNEIL at SURGERY [facility] ORS   • Mass excision general  4/25/2011     Performed by RALPH MCNEIL at SURGERY [facility] ORS   • Pelviscopy  9/6/2011     Performed by SHERRY HEREDIA at SURGERY SAME DAY [facility] ORS   • Intra uterine device removal  9/6/2011     Performed by SHERRY HEREDIA at SURGERY SAME DAY [facility]  ORS   • Tubal coagulation laparoscopic bilateral  9/6/2011     Performed by SHERRY HEREDIA at SURGERY SAME DAY [facility] ORS   • Elbow orif Right 1/26/2016     Procedure: ELBOW ORIF olecranon;  Surgeon: Fausto Nunez M.D.;  Location: SURGERY [facility];  Service:      Family History   Problem Relation Age of Onset   • Diabetes Maternal Grandmother      IDDM   • Hypertension Maternal Grandmother      meds   • Heart Disease Maternal Grandfather    • Diabetes Paternal Grandmother      esrd     History   Smoking status   • Never Smoker    Smokeless tobacco   • Never Used     Allergies   Allergen Reactions   • No Known Drug Allergy      Outpatient Encounter Prescriptions as of 8/18/2017   Medication Sig Dispense Refill   • metoprolol SR (TOPROL XL) 25 MG TABLET SR 24 HR Take 1 Tab by mouth every bedtime. 30 Tab 11   • atorvastatin (LIPITOR) 40 MG Tab Take 1 Tab by mouth every evening. 30 Tab 11   • warfarin (COUMADIN) 5 MG Tab TAKE ONE-HALF TO ONE TABLET BY MOUTH ONCE DAILY AS DIRECTED BY COUMADIN CLINIC 90 Tab 1   • therapeutic multivitamin-minerals (THERAGRAN-M) Tab Take 1 Tab by mouth every day. 30 Tab 0   • Cholecalciferol (VITAMIN D) 2000 UNITS Cap Take 1 Cap by mouth every day. 30 Cap 0   • sumatriptan (IMITREX) 20 MG/ACT nasal spray Spray 1 Spray in nose as needed for Migraine. 1 Each 3   • [DISCONTINUED] enoxaparin (LOVENOX) 80 MG/0.8ML Solution inj Inject 80 mg as instructed every 24 hours. 10 Syringe 1   • [DISCONTINUED] atorvastatin (LIPITOR) 40 MG Tab Take 1 Tab by mouth every evening. 30 Tab 1   • [DISCONTINUED] metoprolol SR (TOPROL XL) 25 MG TABLET SR 24 HR Take 1 Tab by mouth every bedtime. 30 Tab 11     No facility-administered encounter medications on file as of 8/18/2017.     Review of Systems   Constitutional: Negative for fever.   HENT: Negative for congestion.    Respiratory: Negative for hemoptysis and shortness of breath.    Cardiovascular: Negative for chest pain, palpitations,  orthopnea, claudication, leg swelling and PND.   Gastrointestinal: Negative for nausea, vomiting, abdominal pain and blood in stool.   Musculoskeletal: Negative for myalgias.   Neurological: Negative for dizziness, speech change, focal weakness and weakness.        Objective:   BP 90/60 mmHg  Pulse 84  Resp 12  Ht 1.524 m (5')  Wt 57.153 kg (126 lb)  BMI 24.61 kg/m2  SpO2 97%  LMP     Physical Exam   Constitutional: She is oriented to person, place, and time. No distress.   HENT:   Mouth/Throat: Mucous membranes are normal.   Eyes: Conjunctivae and EOM are normal.   Neck: No JVD present. No tracheal deviation present. No thyroid mass and no thyromegaly present.   Cardiovascular: Normal rate, regular rhythm and intact distal pulses.    No murmur heard.  Good click  Sternotomy scar present   Pulmonary/Chest: Effort normal and breath sounds normal. No respiratory distress. She exhibits no tenderness.   Abdominal: Soft. There is no tenderness.   Musculoskeletal: Normal range of motion. She exhibits no edema.   Neurological: She is alert and oriented to person, place, and time. She has normal strength. She displays no tremor.   Skin: Skin is warm and dry. She is not diaphoretic.   Psychiatric: She has a normal mood and affect. Her behavior is normal.   Vitals reviewed.      Assessment:     1. S/P mitral valve replacement with metallic valve     2. Pacemaker     3. PAF (paroxysmal atrial fibrillation) (CMS-[facility])  metoprolol SR (TOPROL XL) 25 MG TABLET SR 24 HR   4. Hypercholesterolemia  atorvastatin (LIPITOR) 40 MG Tab   5. Rheumatic tricuspid valve regurgitation         Medical Decision Making:  Today's Assessment / Status / Plan:     The patient's above cardiovascular conditions are stable.   BP borderline but asymptomatic.  Will continue current medications and have the patient return for a followup in 6 months.   Will be happy to see the patient sooner as needed.   Thank you for allowing me to participate in the  caring of this patient.      No Recipients

## 2017-08-18 NOTE — PROGRESS NOTES
Subjective:   Smaeer Boston is a 27 y.o. female who presents today for f/u MVR, dyslipidemia and pacemaker in situ    The patient is doing well from cardiac standpoint.   Eating differently (more vegetable) to lose weight.  Loss over 20 lbs lately.  Her INR has been low. Currently on enoxaparin.   Shdenies any chest pain, palpitation or heart failure type symptoms.  Denies any side effect from medications.    LDL this week 125    Pacer check functioning well, no sig mode switching, battery now <1.5 year    Past Medical History   Diagnosis Date   • Anemia 4/17/2011   • Migraine      occasional   • CVA (cerebral infarction)    • CHF (congestive heart failure)    • History of atrial fibrillation    • Left atrial thrombus (CMS-HCC)    • Tricuspid regurgitation      s/p repair   • Mitral stenosis      s/p mechanical MVR on coumadin   • Pulmonary hypertension (CMS-HCC)    • Pacemaker      [facility] [place] for 3rd degree AVB   • Hyperlipidemia    • Third degree heart block (CMS-HCC)    • Chronic anticoagulation    • Elbow fracture      Past Surgical History   Procedure Laterality Date   • Primary c section  4/6/2011     Performed by DEBBIE MAXWELL at LABOR AND DELIVERY   • Mitral valve replace  4/25/2011     Performed by RALPH MCNEIL at SURGERY [facility]   • Tricuspid valve repair  4/25/2011     Performed by RALPH MCNEIL at SURGERY [facility] ORS   • Mass excision general  4/25/2011     Performed by RALPH MCNEIL at SURGERY [facility] ORS   • Pelviscopy  9/6/2011     Performed by SHERRY HEREDIA at SURGERY SAME DAY [facility] ORS   • Intra uterine device removal  9/6/2011     Performed by SHERRY HEREDIA at SURGERY SAME DAY [facility] ORS   • Tubal coagulation laparoscopic bilateral  9/6/2011     Performed by SHERRY HEREDIA at SURGERY SAME DAY [facility] ORS   • Elbow orif Right 1/26/2016     Procedure: ELBOW ORIF yajaira;  Surgeon: Fausto Nunez M.D.;  Location: [facility];   Service:      Family History   Problem Relation Age of Onset   • Diabetes Maternal Grandmother      IDDM   • Hypertension Maternal Grandmother      meds   • Heart Disease Maternal Grandfather    • Diabetes Paternal Grandmother      esrd     History   Smoking status   • Never Smoker    Smokeless tobacco   • Never Used     Allergies   Allergen Reactions   • No Known Drug Allergy      Outpatient Encounter Prescriptions as of 8/18/2017   Medication Sig Dispense Refill   • metoprolol SR (TOPROL XL) 25 MG TABLET SR 24 HR Take 1 Tab by mouth every bedtime. 30 Tab 11   • atorvastatin (LIPITOR) 40 MG Tab Take 1 Tab by mouth every evening. 30 Tab 11   • warfarin (COUMADIN) 5 MG Tab TAKE ONE-HALF TO ONE TABLET BY MOUTH ONCE DAILY AS DIRECTED BY COUMADIN CLINIC 90 Tab 1   • therapeutic multivitamin-minerals (THERAGRAN-M) Tab Take 1 Tab by mouth every day. 30 Tab 0   • Cholecalciferol (VITAMIN D) 2000 UNITS Cap Take 1 Cap by mouth every day. 30 Cap 0   • sumatriptan (IMITREX) 20 MG/ACT nasal spray Spray 1 Spray in nose as needed for Migraine. 1 Each 3   • [DISCONTINUED] enoxaparin (LOVENOX) 80 MG/0.8ML Solution inj Inject 80 mg as instructed every 24 hours. 10 Syringe 1   • [DISCONTINUED] atorvastatin (LIPITOR) 40 MG Tab Take 1 Tab by mouth every evening. 30 Tab 1   • [DISCONTINUED] metoprolol SR (TOPROL XL) 25 MG TABLET SR 24 HR Take 1 Tab by mouth every bedtime. 30 Tab 11     No facility-administered encounter medications on file as of 8/18/2017.     Review of Systems   Constitutional: Negative for fever.   HENT: Negative for congestion.    Respiratory: Negative for hemoptysis and shortness of breath.    Cardiovascular: Negative for chest pain, palpitations, orthopnea, claudication, leg swelling and PND.   Gastrointestinal: Negative for nausea, vomiting, abdominal pain and blood in stool.   Musculoskeletal: Negative for myalgias.   Neurological: Negative for dizziness, speech change, focal weakness and weakness.         Objective:   BP 90/60 mmHg  Pulse 84  Resp 12  Ht 1.524 m (5')  Wt 57.153 kg (126 lb)  BMI 24.61 kg/m2  SpO2 97%  LMP     Physical Exam   Constitutional: She is oriented to person, place, and time. No distress.   HENT:   Mouth/Throat: Mucous membranes are normal.   Eyes: Conjunctivae and EOM are normal.   Neck: No JVD present. No tracheal deviation present. No thyroid mass and no thyromegaly present.   Cardiovascular: Normal rate, regular rhythm and intact distal pulses.    No murmur heard.  Good click  Sternotomy scar present   Pulmonary/Chest: Effort normal and breath sounds normal. No respiratory distress. She exhibits no tenderness.   Abdominal: Soft. There is no tenderness.   Musculoskeletal: Normal range of motion. She exhibits no edema.   Neurological: She is alert and oriented to person, place, and time. She has normal strength. She displays no tremor.   Skin: Skin is warm and dry. She is not diaphoretic.   Psychiatric: She has a normal mood and affect. Her behavior is normal.   Vitals reviewed.      Assessment:     1. S/P mitral valve replacement with metallic valve     2. Pacemaker     3. PAF (paroxysmal atrial fibrillation) (CMS-[facility])  metoprolol SR (TOPROL XL) 25 MG TABLET SR 24 HR   4. Hypercholesterolemia  atorvastatin (LIPITOR) 40 MG Tab   5. Rheumatic tricuspid valve regurgitation         Medical Decision Making:  Today's Assessment / Status / Plan:     The patient's above cardiovascular conditions are stable.   BP borderline but asymptomatic.  Will continue current medications and have the patient return for a followup in 6 months.   Will be happy to see the patient sooner as needed.   Thank you for allowing me to participate in the caring of this patient.

## 2017-08-21 ENCOUNTER — HOSPITAL ENCOUNTER (OUTPATIENT)
Dept: LAB | Facility: MEDICAL CENTER | Age: 27
End: 2017-08-21
Attending: NURSE PRACTITIONER
Payer: COMMERCIAL

## 2017-08-21 DIAGNOSIS — I48.0 PAROXYSMAL ATRIAL FIBRILLATION (HCC): ICD-10-CM

## 2017-08-21 LAB
INR PPP: 3.09 (ref 0.87–1.13)
PROTHROMBIN TIME: 32.8 SEC (ref 12–14.6)

## 2017-08-21 PROCEDURE — 36415 COLL VENOUS BLD VENIPUNCTURE: CPT

## 2017-08-21 PROCEDURE — 85610 PROTHROMBIN TIME: CPT

## 2017-08-22 ENCOUNTER — ANTICOAGULATION MONITORING (OUTPATIENT)
Dept: VASCULAR LAB | Facility: MEDICAL CENTER | Age: 27
End: 2017-08-22

## 2017-08-22 DIAGNOSIS — I51.3 THROMBUS OF LEFT ATRIAL APPENDAGE WITHOUT ANTECEDENT MYOCARDIAL INFARCTION: ICD-10-CM

## 2017-08-22 DIAGNOSIS — I48.0 PAROXYSMAL ATRIAL FIBRILLATION (HCC): ICD-10-CM

## 2017-08-22 NOTE — PROGRESS NOTES
Anticoagulation Summary as of 8/22/2017     INR goal 2.5-3.5   Selected INR 3.09 (8/21/2017)   Maintenance plan 5 mg (5 mg x 1) on Mon, Fri; 10 mg (5 mg x 2) all other days   Weekly total 60 mg   Plan last modified Taj Allen, PHARMD (8/22/2017)   Next INR check 8/25/2017   Target end date Indefinite    Indications   Paroxysmal atrial fibrillation (CMS-HCC) [I48.0]  Mitral stenosis s/p Mechanical MVR [I05.0]         Anticoagulation Episode Summary     INR check location Outside Lab    Preferred lab     Send INR reminders to     [facility]       Anticoagulation Care Providers     Provider Role Specialty Phone number    Bird Rodriguez D.O. Referring Family Medicine [phone]    Trey Dubon M.D. Referring Cardiac Surgery [phone]    Taj Allen, PHARMD Responsible          Anticoagulation Patient Findings    Patient's INR was therapeutic.  Pt denies any unusual s/s of bleeding, bruising, clotting or any changes to diet or medications.  Confirmed dosing regimen.  Pt to begin increased dosing regimen based on past 7 days of warfarin consumption.  Follow up in 3 days.    Taj Allen, PHARMD

## 2017-08-25 ENCOUNTER — HOSPITAL ENCOUNTER (OUTPATIENT)
Dept: LAB | Facility: MEDICAL CENTER | Age: 27
End: 2017-08-25
Attending: NURSE PRACTITIONER
Payer: COMMERCIAL

## 2017-08-25 ENCOUNTER — ANTICOAGULATION MONITORING (OUTPATIENT)
Dept: VASCULAR LAB | Facility: MEDICAL CENTER | Age: 27
End: 2017-08-25

## 2017-08-25 DIAGNOSIS — I48.0 PAROXYSMAL ATRIAL FIBRILLATION (HCC): ICD-10-CM

## 2017-08-25 LAB
INR PPP: 3.5 (ref 0.87–1.13)
PROTHROMBIN TIME: 36.2 SEC (ref 12–14.6)

## 2017-08-25 PROCEDURE — 85610 PROTHROMBIN TIME: CPT

## 2017-08-25 PROCEDURE — 36415 COLL VENOUS BLD VENIPUNCTURE: CPT

## 2017-08-25 NOTE — PROGRESS NOTES
Anticoagulation Summary as of 8/25/2017     INR goal 2.5-3.5   Selected INR 3.50 (8/25/2017)   Maintenance plan 5 mg (5 mg x 1) on Mon, Wed, Fri; 10 mg (5 mg x 2) all other days   Weekly total 55 mg   Plan last modified Taj Allen, PHARMD (8/25/2017)   Next INR check 8/29/2017   Target end date Indefinite    Indications   Paroxysmal atrial fibrillation (CMS-HCC) [I48.0]  Mitral stenosis s/p Mechanical MVR [I05.0]         Anticoagulation Episode Summary     INR check location Outside Lab    Preferred lab     Send INR reminders to     Comments [facility]       Anticoagulation Care Providers     Provider Role Specialty Phone number    Bird Rodriguez D.O. Referring Family Medicine [phone]    Trey Dubon M.D. Referring Cardiac Surgery [phone]    Taj Allen, PHARMD Responsible          Anticoagulation Patient Findings    Patient's INR was therapeutic.   Denies any unusual s/s of bleeding, bruising, clotting.  Denies any changes to:   Diet   Medications  Confirmed dosing regimen.    Pt is to continue with current warfarin dosing regimen.    Follow up in 4 days.    Taj Allen, PHARMD

## 2017-08-29 ENCOUNTER — HOSPITAL ENCOUNTER (OUTPATIENT)
Dept: LAB | Facility: MEDICAL CENTER | Age: 27
End: 2017-08-29
Attending: NURSE PRACTITIONER
Payer: COMMERCIAL

## 2017-08-29 ENCOUNTER — HOSPITAL ENCOUNTER (OUTPATIENT)
Facility: MEDICAL CENTER | Age: 27
End: 2017-08-29
Attending: NURSE PRACTITIONER
Payer: COMMERCIAL

## 2017-08-29 ENCOUNTER — OFFICE VISIT (OUTPATIENT)
Dept: MEDICAL GROUP | Facility: MEDICAL CENTER | Age: 27
End: 2017-08-29
Payer: COMMERCIAL

## 2017-08-29 ENCOUNTER — APPOINTMENT (OUTPATIENT)
Dept: LAB | Facility: MEDICAL CENTER | Age: 27
End: 2017-08-29
Payer: COMMERCIAL

## 2017-08-29 VITALS
BODY MASS INDEX: 25.13 KG/M2 | WEIGHT: 128 LBS | SYSTOLIC BLOOD PRESSURE: 100 MMHG | HEART RATE: 91 BPM | DIASTOLIC BLOOD PRESSURE: 66 MMHG | OXYGEN SATURATION: 99 % | HEIGHT: 60 IN | TEMPERATURE: 98 F

## 2017-08-29 DIAGNOSIS — G43.109 MIGRAINE WITH AURA AND WITHOUT STATUS MIGRAINOSUS, NOT INTRACTABLE: ICD-10-CM

## 2017-08-29 DIAGNOSIS — E78.5 HYPERLIPIDEMIA LDL GOAL <100: ICD-10-CM

## 2017-08-29 DIAGNOSIS — Z01.419 ROUTINE GYNECOLOGICAL EXAMINATION: ICD-10-CM

## 2017-08-29 DIAGNOSIS — Z12.4 ROUTINE CERVICAL SMEAR: ICD-10-CM

## 2017-08-29 DIAGNOSIS — R73.01 IFG (IMPAIRED FASTING GLUCOSE): ICD-10-CM

## 2017-08-29 DIAGNOSIS — R77.1 ABNORMALITY OF GLOBULIN: ICD-10-CM

## 2017-08-29 DIAGNOSIS — I48.0 PAROXYSMAL ATRIAL FIBRILLATION (HCC): ICD-10-CM

## 2017-08-29 LAB
INR PPP: 3.3 (ref 0.87–1.13)
PROTHROMBIN TIME: 34.6 SEC (ref 12–14.6)

## 2017-08-29 PROCEDURE — 36415 COLL VENOUS BLD VENIPUNCTURE: CPT

## 2017-08-29 PROCEDURE — 88175 CYTOPATH C/V AUTO FLUID REDO: CPT

## 2017-08-29 PROCEDURE — 99395 PREV VISIT EST AGE 18-39: CPT | Performed by: NURSE PRACTITIONER

## 2017-08-29 PROCEDURE — 87591 N.GONORRHOEAE DNA AMP PROB: CPT

## 2017-08-29 PROCEDURE — 85610 PROTHROMBIN TIME: CPT

## 2017-08-29 PROCEDURE — 87491 CHLMYD TRACH DNA AMP PROBE: CPT

## 2017-08-29 RX ORDER — SUMATRIPTAN 20 MG/1
1 SPRAY NASAL PRN
Qty: 1 EACH | Refills: 3 | Status: SHIPPED | OUTPATIENT
Start: 2017-08-29 | End: 2018-01-05 | Stop reason: SDUPTHER

## 2017-08-29 ASSESSMENT — PATIENT HEALTH QUESTIONNAIRE - PHQ9: CLINICAL INTERPRETATION OF PHQ2 SCORE: 0

## 2017-08-29 NOTE — PROGRESS NOTES
Subjective:      Sameer Boston is a 27 y.o. female who presents with No chief complaint on file.            HPI  Seen in f/u for PE.  She is feeling OK.    Needs refills on her imitrex.  Only has them occas.  Well controlled with imitrex.   Reviewed lab with pt. Her CMP in march showed globulin of 4.7.  Checked her SPEP, Immunoglobulins and light chains with some abn.  No M SPIKE.  Pearl garcia results.  No concerns now for MGUS or myeloma.  Now her globulin is down to 3.6.    LP shows trg are way down from last year.  Was 197 and now wnl.  HDL is at goal.  LDL is still up at 122 but down from last yr at 167.    She is working daily.  She is eating mostly veggies.  She is on lovenox to keep her INR up until they can regulate her INR only only veggies and low fat diet.  She is on lovenox for about 2 weeks.  Very serious about her diet.  Working with cardiac also bring down in LDL since goal is <100.  She is also on lipitor.  a1c is down from 5.6 to 5.2.    GFR, alb/cr ratio, D IS WNL    Patient Active Problem List    Diagnosis Date Noted   • CHF (congestive heart failure) (CMS-HCC)    • Pulmonary hypertension (CMS-HCC)    • Elbow fracture 01/23/2016   • Chronic anticoagulation 07/27/2015   • Hypercholesterolemia 07/27/2015   • Migraine headache with aura    • Paroxysmal atrial fibrillation (CMS-HCC) 10/08/2012   • Pacemaker [facility] for 3 degree heart block 04/29/2011   • Tricuspid regurgitation s/p repair 04/29/2011   • Cerebral infarction (CMS-HCC) 04/17/2011   • Left atrial thrombus s/p removal and atrial appendage ligation 04/17/2011   • Mitral stenosis s/p Mechanical MVR 04/17/2011   • Anemia 04/17/2011     Current Outpatient Prescriptions   Medication Sig Dispense Refill   • metoprolol SR (TOPROL XL) 25 MG TABLET SR 24 HR Take 1 Tab by mouth every bedtime. 30 Tab 11   • atorvastatin (LIPITOR) 40 MG Tab Take 1 Tab by mouth every evening. 30 Tab 11   • warfarin (COUMADIN) 5 MG Tab TAKE ONE-HALF TO ONE  TABLET BY MOUTH ONCE DAILY AS DIRECTED BY COUMADIN CLINIC 90 Tab 1   • therapeutic multivitamin-minerals (THERAGRAN-M) Tab Take 1 Tab by mouth every day. 30 Tab 0   • Cholecalciferol (VITAMIN D) 2000 UNITS Cap Take 1 Cap by mouth every day. 30 Cap 0   • sumatriptan (IMITREX) 20 MG/ACT nasal spray Spray 1 Spray in nose as needed for Migraine. 1 Each 3     No current facility-administered medications for this visit.      Allergies   Allergen Reactions   • No Known Drug Allergy        ROS  Review of Systems   Constitutional: Negative.  Negative for fever, chills, weight loss, malaise/fatigue and diaphoresis.   HENT: Negative.  Negative for hearing loss, ear pain, nosebleeds, congestion, sore throat, neck pain, tinnitus and ear discharge.    Eyes: Negative.  Negative for blurred vision, double vision, photophobia, pain, discharge and redness.   Respiratory: Negative.  Negative for cough, hemoptysis, sputum production, shortness of breath, wheezing and stridor.    Cardiovascular: Negative.  Negative for chest pain, palpitations, orthopnea, claudication, leg swelling and PND.   Gastrointestinal: Negative.  Negative for heartburn, nausea, vomiting, abdominal pain, diarrhea, constipation, blood in stool and melena.   Genitourinary: Negative.  Negative for dysuria, urgency, frequency, incontinence, hematuria and flank pain.   Musculoskeletal: Negative.  Negative for myalgias, back pain, joint pain and falls.   Skin: Negative.  Negative for itching and rash.   Neurological: Negative.  Negative for dizziness, tingling, tremors, sensory change, speech change, focal weakness, seizures, loss of consciousness, weakness and headaches.   Endo/Heme/Allergies: Negative.  Negative for environmental allergies and polydipsia. Does not bruise/bleed easily.   Psychiatric/Behavioral: Negative.  Negative for depression, suicidal ideas, hallucinations, memory loss and substance abuse. The patient is not nervous/anxious and does have insomnia.     All other systems reviewed and are negative.           Objective:     /66   Pulse 91   Temp 36.7 °C (98 °F)   Ht 1.524 m (5')   Wt 58.1 kg (128 lb)   LMP 08/15/2017   SpO2 99%   Breastfeeding? No   BMI 25.00 kg/m²      Physical Exam      Physical Exam   Vitals reviewed.  Constitutional: oriented to person, place, and time. appears well-developed and well-nourished. No distress.   HENT: Head: Normocephalic and atraumatic. Bilateral tympanic membranes wnl w/o bulging.  Right Ear: External ear normal. Left Ear: External ear normal. Nose: Nose normal.  Mouth/Throat: Oropharynx is clear and moist. No oropharyngeal exudate. rudolph tm wnl. Eyes: Conjunctivae and EOM are normal. Pupils are equal, round, and reactive to light. Right eye exhibits no discharge. Left eye exhibits no discharge. No scleral icterus.    Neck: Normal range of motion. Neck supple. No JVD present.   Cardiovascular: Normal rate, regular rhythm, normal heart sounds and intact distal pulses.  Exam reveals no gallop and no friction rub.  No murmur heard.  No carotid bruits   Pulmonary/Chest: Effort normal and breath sounds normal. No stridor. No respiratory distress. no wheezes or rales. exhibits no tenderness.   Abdominal: Soft. Bowel sounds are normal. exhibits no distension and no mass. No tenderness. no rebound and no guarding.   Musculoskeletal: Normal range of motion. exhibits no edema or tenderness.  rudolph pedal pulses 2+.  Lymphadenopathy:  no cervical or supraclavicular adenopathy.   Neurological: alert and oriented to person, place, and time. has normal reflexes. displays normal reflexes. No cranial nerve deficit. exhibits normal muscle tone. Coordination normal.   Skin: Skin is warm and dry. No rash noted. no diaphoresis. No erythema. No pallor.   Psychiatric: normal mood and affect. behavior is normal.   SUBJECTIVE: 27 y.o. female for annual routine pap and checkup.  Gyn History:   Last Pap:   Contraception: none  H/O  Abnormal Pap none  H/O STI none  Current partner: none  Lmp:LMP Date: 08/15/17  ROS:  Menses every month with no excessive cramping or bleeding.  No analgesics required during menses.  No pelvic pain, vaginal discharge or dyspareunia.  No breast tenderness, mass, nipple discharge, changes in size or contour, or abnormal cyclic discomfort.   Breast Exam:Performed with instruction during examination. Breasts equal size and shape.  No axillary lymphadenopathy, no skin changes, no dominant masses. No nipple retraction  Pelvic Exam - Sure Path Pap obtained and specimen sent to lab. Normal external genitalia with no lesions. Normal vaginal mucosa with normal rugation. Cervix has no visible lesions. No cervical motion tenderness. Uterus is normal sized with no masses. No adnexal tenderness or enlargement appreciated.                        Assessment/Plan:     1. Routine gynecological examination  THINPREP RFLX HPV ASCUS W/CTNG    f/u with pt with all test results and yearly or sooner if test abn   2. Routine cervical smear  THINPREP RFLX HPV ASCUS W/CTNG   3. IFG (impaired fasting glucose)      now wnl. continue healthy diet   4. Hyperlipidemia LDL goal <100      much improved.  continue diet and exercise   5. Abnormality of globulin      resolved.  will recheck in 6 months.    6. Migraine with aura and without status migrainosus, not intractable  sumatriptan (IMITREX) 20 MG/ACT nasal spray    refill imitrex

## 2017-08-30 ENCOUNTER — ANTICOAGULATION MONITORING (OUTPATIENT)
Dept: VASCULAR LAB | Facility: MEDICAL CENTER | Age: 27
End: 2017-08-30

## 2017-08-30 DIAGNOSIS — I05.0 MITRAL VALVE STENOSIS, UNSPECIFIED ETIOLOGY: ICD-10-CM

## 2017-08-30 DIAGNOSIS — I48.0 PAROXYSMAL ATRIAL FIBRILLATION (HCC): ICD-10-CM

## 2017-08-30 LAB
C TRACH DNA GENITAL QL NAA+PROBE: NEGATIVE
CYTOLOGY REG CYTOL: NORMAL
N GONORRHOEA DNA GENITAL QL NAA+PROBE: NEGATIVE
SPECIMEN SOURCE: NORMAL

## 2017-08-30 NOTE — PROGRESS NOTES
Anticoagulation Summary  As of 8/30/2017    INR goal:   2.5-3.5   TTR:   69.2 % (2.2 y)   Today's INR:   3.30 (8/29/2017)   Maintenance plan:   5 mg (5 mg x 1) on Mon, Wed, Fri; 10 mg (5 mg x 2) all other days   Weekly total:   55 mg   Plan last modified:   Taj Allen PharmD (8/25/2017)   Next INR check:   9/5/2017   Target end date:   Indefinite    Indications    Paroxysmal atrial fibrillation (CMS-HCC) [I48.0]  Mitral stenosis s/p Mechanical MVR [I05.0]             Anticoagulation Episode Summary     INR check location:   Outside Lab    Preferred lab:       Send INR reminders to:       Comments:   [facility]       Anticoagulation Care Providers     Provider Role Specialty Phone number    Bird Rodriguez D.O. Referring Family Medicine [phone]    Trey Dubon M.D. Referring Cardiac Surgery [phone]    Taj Allen PharmD Responsible          Anticoagulation Patient Findings  Patient Findings     Negatives:   Signs/symptoms of thrombosis, Signs/symptoms of bleeding, Laboratory test error suspected, Change in health, Change in alcohol use, Change in activity, Upcoming invasive procedure, Emergency department visit, Upcoming dental procedure, Missed doses, Extra doses, Change in medications, Change in diet/appetite, Hospital admission, Bruising, Other complaints        Spoke with patient today regarding therapeutic INR of 3.3.  Patient denies any signs/symptoms of bruising or bleeding or any changes in diet and medications.  Instructed patient to call clinic with any questions or concerns.  Pt is to continue with current warfarin dosing regimen.  Follow up in 1 weeks.    Everett Dangelo, AgataD

## 2017-09-18 DIAGNOSIS — I48.91 ATRIAL FIBRILLATION, UNSPECIFIED TYPE (HCC): ICD-10-CM

## 2017-09-18 RX ORDER — WARFARIN SODIUM 5 MG/1
TABLET ORAL
Qty: 90 TAB | Refills: 2 | Status: SHIPPED | OUTPATIENT
Start: 2017-09-18 | End: 2018-02-27 | Stop reason: SDUPTHER

## 2017-09-18 RX ORDER — WARFARIN SODIUM 5 MG/1
TABLET ORAL
Qty: 90 TAB | Refills: 0 | Status: SHIPPED | OUTPATIENT
Start: 2017-09-18 | End: 2017-09-18 | Stop reason: SDUPTHER

## 2017-09-19 ENCOUNTER — HOSPITAL ENCOUNTER (OUTPATIENT)
Dept: LAB | Facility: MEDICAL CENTER | Age: 27
End: 2017-09-19
Attending: NURSE PRACTITIONER
Payer: COMMERCIAL

## 2017-09-19 ENCOUNTER — TELEPHONE (OUTPATIENT)
Dept: VASCULAR LAB | Facility: MEDICAL CENTER | Age: 27
End: 2017-09-19

## 2017-09-19 DIAGNOSIS — I51.3 THROMBUS OF LEFT ATRIAL APPENDAGE WITHOUT ANTECEDENT MYOCARDIAL INFARCTION: ICD-10-CM

## 2017-09-19 LAB
INR PPP: 1.78 (ref 0.87–1.13)
PROTHROMBIN TIME: 21.3 SEC (ref 12–14.6)

## 2017-09-19 PROCEDURE — 36415 COLL VENOUS BLD VENIPUNCTURE: CPT

## 2017-09-19 PROCEDURE — 85610 PROTHROMBIN TIME: CPT

## 2017-09-20 ENCOUNTER — ANTICOAGULATION MONITORING (OUTPATIENT)
Dept: VASCULAR LAB | Facility: MEDICAL CENTER | Age: 27
End: 2017-09-20

## 2017-09-20 DIAGNOSIS — I48.0 PAROXYSMAL ATRIAL FIBRILLATION (HCC): ICD-10-CM

## 2017-09-20 NOTE — PROGRESS NOTES
Anticoagulation Summary  As of 9/20/2017    INR goal:   2.5-3.5   TTR:   68.7 % (2.2 y)   Today's INR:   1.78! (9/19/2017)   Maintenance plan:   5 mg (5 mg x 1) on Mon, Wed, Fri; 10 mg (5 mg x 2) all other days   Weekly total:   55 mg   Plan last modified:   Taj Allen PharmD (8/25/2017)   Next INR check:   9/25/2017   Target end date:   Indefinite    Indications    Paroxysmal atrial fibrillation (CMS-HCC) [I48.0]  Mitral stenosis s/p Mechanical MVR [I05.0]             Anticoagulation Episode Summary     INR check location:   Outside Lab    Preferred lab:       Send INR reminders to:       Comments:   [facility]       Anticoagulation Care Providers     Provider Role Specialty Phone number    Bird Rodriguez D.O. Referring Family Medicine [phone]    rTey Dubon M.D. Referring Cardiac Surgery [phone]    Agata SantosD Responsible          Anticoagulation Patient Findings    Left voicemail message to report a SUB therapeutic INR of 1.8.  Pt to bolus 15 mg warfarin x 2 days then continue with current warfarin dosing regimen. Requested pt to contact the clinic to discuss enoxaparin.  Suggest initiating enoxaparin and ordering it to pt's preferred pharmacy (80 or 100 mg enoxaparin q124 hr SQ).  Requested pt contact the clinic for any s/s of unusual bleeding, bruising, clotting or any changes to diet or medication. FU 5 days.    Taj Allen, [place]

## 2017-09-27 ENCOUNTER — TELEPHONE (OUTPATIENT)
Dept: VASCULAR LAB | Facility: MEDICAL CENTER | Age: 27
End: 2017-09-27

## 2017-09-28 ENCOUNTER — HOSPITAL ENCOUNTER (OUTPATIENT)
Dept: LAB | Facility: MEDICAL CENTER | Age: 27
End: 2017-09-28
Attending: NURSE PRACTITIONER
Payer: COMMERCIAL

## 2017-09-28 DIAGNOSIS — I51.3 THROMBUS OF LEFT ATRIAL APPENDAGE WITHOUT ANTECEDENT MYOCARDIAL INFARCTION: ICD-10-CM

## 2017-10-02 ENCOUNTER — HOSPITAL ENCOUNTER (OUTPATIENT)
Dept: LAB | Facility: MEDICAL CENTER | Age: 27
End: 2017-10-02
Attending: NURSE PRACTITIONER
Payer: COMMERCIAL

## 2017-10-02 DIAGNOSIS — I51.3 THROMBUS OF LEFT ATRIAL APPENDAGE WITHOUT ANTECEDENT MYOCARDIAL INFARCTION: ICD-10-CM

## 2017-10-02 LAB
INR PPP: 1.49 (ref 0.87–1.13)
PROTHROMBIN TIME: 18.5 SEC (ref 12–14.6)

## 2017-10-02 PROCEDURE — 36415 COLL VENOUS BLD VENIPUNCTURE: CPT

## 2017-10-02 PROCEDURE — 85610 PROTHROMBIN TIME: CPT

## 2017-10-03 ENCOUNTER — ANTICOAGULATION MONITORING (OUTPATIENT)
Dept: VASCULAR LAB | Facility: MEDICAL CENTER | Age: 27
End: 2017-10-03

## 2017-10-03 DIAGNOSIS — I48.0 PAROXYSMAL ATRIAL FIBRILLATION (HCC): ICD-10-CM

## 2017-10-03 NOTE — PROGRESS NOTES
Anticoagulation Summary  As of 10/3/2017    INR goal:   2.5-3.5   TTR:   67.7 % (2.3 y)   Today's INR:   1.49! (10/2/2017)   Maintenance plan:   5 mg (5 mg x 1) on Mon, Fri; 10 mg (5 mg x 2) all other days   Weekly total:   60 mg   Plan last modified:   Taj Allen PharmD (10/3/2017)   Next INR check:   10/10/2017   Target end date:   Indefinite    Indications    Paroxysmal atrial fibrillation (CMS-HCC) [I48.0]  Mitral stenosis s/p Mechanical MVR [I05.0]             Anticoagulation Episode Summary     INR check location:   Outside Lab    Preferred lab:       Send INR reminders to:       Comments:   [facility]       Anticoagulation Care Providers     Provider Role Specialty Phone number    Bird Rodriguez D.O. Referring Family Medicine [phone]    Trey Dubon M.D. Referring Cardiac Surgery [phone]    Taj Allen PharmD Responsible          Anticoagulation Patient Findings    Left voicemail message to report a SUB therapeutic INR of 1.5.  Pt to bolus 15 mg warfarin x 2 days then begin increased warfarin dosing regimen. Requested pt to contact the clinic to discuss enoxaparin.  Suggest initiating enoxaparin and ordering it to pt's preferred pharmacy (80 or 100 mg enoxaparin q24 hr SQ).  Requested pt contact the clinic for any s/s of unusual bleeding, bruising, clotting or any changes to diet or medication. FU 7 days.  Would like to discuss with pt her dosing regimen to evaluate dosing regimen.  The above was done based on the best information available to me.     Taj Allen, AgataD

## 2017-10-06 ENCOUNTER — IMMUNIZATION (OUTPATIENT)
Dept: OCCUPATIONAL MEDICINE | Facility: CLINIC | Age: 27
End: 2017-10-06

## 2017-10-06 DIAGNOSIS — Z23 NEED FOR VACCINATION: ICD-10-CM

## 2017-10-06 PROCEDURE — 90686 IIV4 VACC NO PRSV 0.5 ML IM: CPT | Performed by: PREVENTIVE MEDICINE

## 2017-10-09 ENCOUNTER — HOSPITAL ENCOUNTER (OUTPATIENT)
Dept: LAB | Facility: MEDICAL CENTER | Age: 27
End: 2017-10-09
Attending: NURSE PRACTITIONER
Payer: COMMERCIAL

## 2017-10-09 DIAGNOSIS — I51.3 THROMBUS OF LEFT ATRIAL APPENDAGE WITHOUT ANTECEDENT MYOCARDIAL INFARCTION: ICD-10-CM

## 2017-10-09 LAB
INR PPP: 3.48 (ref 0.87–1.13)
PROTHROMBIN TIME: 36 SEC (ref 12–14.6)

## 2017-10-09 PROCEDURE — 85610 PROTHROMBIN TIME: CPT

## 2017-10-09 PROCEDURE — 36415 COLL VENOUS BLD VENIPUNCTURE: CPT

## 2017-10-10 ENCOUNTER — ANTICOAGULATION MONITORING (OUTPATIENT)
Dept: VASCULAR LAB | Facility: MEDICAL CENTER | Age: 27
End: 2017-10-10

## 2017-10-10 DIAGNOSIS — I48.0 PAROXYSMAL ATRIAL FIBRILLATION (HCC): ICD-10-CM

## 2017-10-10 NOTE — PROGRESS NOTES
Anticoagulation Summary  As of 10/10/2017    INR goal:   2.5-3.5   TTR:   67.5 % (2.3 y)   Today's INR:   3.48 (10/9/2017)   Maintenance plan:   10 mg (5 mg x 2) every day   Weekly total:   70 mg   Plan last modified:   Tan Helton, [place] (10/10/2017)   Next INR check:   10/17/2017   Target end date:   Indefinite    Indications    Paroxysmal atrial fibrillation (CMS-HCC) [I48.0]  Mitral stenosis s/p Mechanical MVR [I05.0]             Anticoagulation Episode Summary     INR check location:   Outside Lab    Preferred lab:       Send INR reminders to:       Comments:   [facility]       Anticoagulation Care Providers     Provider Role Specialty Phone number    Bird Rodriguez D.O. Referring Family Medicine [phone]    Trey Dubon M.D. Referring Cardiac Surgery [phone]    Taj Allen, PharmD Responsible          Anticoagulation Patient Findings    Spoke to patient on the phone.   INR  therapeutic. She will stop the Lovenox shots   Denies signs/symptoms of bleeding and/or thrombosis.   Denies changes to diet or medications.   Follow up appointment in 1 week(s).    Continue weekly warfarin dose as noted    Tan Helton, [place]

## 2017-10-19 ENCOUNTER — PATIENT MESSAGE (OUTPATIENT)
Dept: HEALTH INFORMATION MANAGEMENT | Facility: OTHER | Age: 27
End: 2017-10-19

## 2017-10-20 ENCOUNTER — ANTICOAGULATION MONITORING (OUTPATIENT)
Dept: VASCULAR LAB | Facility: MEDICAL CENTER | Age: 27
End: 2017-10-20

## 2017-10-20 ENCOUNTER — HOSPITAL ENCOUNTER (OUTPATIENT)
Dept: LAB | Facility: MEDICAL CENTER | Age: 27
End: 2017-10-20
Attending: NURSE PRACTITIONER
Payer: COMMERCIAL

## 2017-10-20 DIAGNOSIS — I51.3 THROMBUS OF LEFT ATRIAL APPENDAGE WITHOUT ANTECEDENT MYOCARDIAL INFARCTION: ICD-10-CM

## 2017-10-20 DIAGNOSIS — I48.0 PAROXYSMAL ATRIAL FIBRILLATION (HCC): ICD-10-CM

## 2017-10-20 LAB
INR PPP: 2.56 (ref 0.87–1.13)
INR PPP: 2.56 (ref 2–3.5)
PROTHROMBIN TIME: 28.3 SEC (ref 12–14.6)

## 2017-10-20 PROCEDURE — 85610 PROTHROMBIN TIME: CPT

## 2017-10-20 PROCEDURE — 36415 COLL VENOUS BLD VENIPUNCTURE: CPT

## 2017-10-20 NOTE — PROGRESS NOTES
Anticoagulation Summary  As of 10/20/2017    INR goal:   2.5-3.5   TTR:   67.9 % (2.3 y)   Today's INR:   2.56   Maintenance plan:   10 mg (5 mg x 2) every day   Weekly total:   70 mg   Plan last modified:   Tan Helton, PharmD (10/10/2017)   Next INR check:   11/3/2017   Target end date:   Indefinite    Indications    Paroxysmal atrial fibrillation (CMS-HCC) [I48.0]  Mitral stenosis s/p Mechanical MVR [I05.0]             Anticoagulation Episode Summary     INR check location:   Outside Lab    Preferred lab:       Send INR reminders to:       Comments:   [facility]       Anticoagulation Care Providers     Provider Role Specialty Phone number    Bird Rodriguez D.O. Referring Family Medicine [phone]    Trey Dubon M.D. Referring Cardiac Surgery [phone]    Taj Allen, PharmD Responsible          Anticoagulation Patient Findings    Spoke to the patient on the phone. Patient denies any signs of bleeding or bruising. Patient's INR is therapeutic at 2.56. No recent changes in diet or medications. Patient instructed to continue the current warfarin dosing as shown above. Follow-up in 2 week(s) 11/03/2017.    Wally Boston, Pharm.D

## 2017-11-06 ENCOUNTER — APPOINTMENT (OUTPATIENT)
Dept: DERMATOLOGY | Facility: IMAGING CENTER | Age: 27
End: 2017-11-06

## 2017-11-10 ENCOUNTER — TELEPHONE (OUTPATIENT)
Dept: CARDIOLOGY | Facility: MEDICAL CENTER | Age: 27
End: 2017-11-10

## 2017-11-11 NOTE — TELEPHONE ENCOUNTER
----- Message from Anna Mahmood M.D. sent at 11/10/2017  4:42 PM PST -----  Regarding: Dental procedure  May use epinephrine if needed  Need SBE prophylaxis (amoxicillin 2 gram 1 hor before)  If need to withhold warfarin for procedure, will need lovenox bridging

## 2017-11-11 NOTE — TELEPHONE ENCOUNTER
Request was received via letter from [facility] patient brought in as walk in.  Clearance letter created.  LM for dental office to call back with fax number.

## 2017-11-13 NOTE — TELEPHONE ENCOUNTER
Dental office called with fax number   Received: Today   Message Contents   SOHAM Juarez/Jael Verdugo at [facility] at [phone] called and said that their fax number is [phone].      Form faxed and confirmed by phone.  Jael ENCISO RN

## 2017-11-20 ENCOUNTER — HOSPITAL ENCOUNTER (OUTPATIENT)
Dept: LAB | Facility: MEDICAL CENTER | Age: 27
End: 2017-11-20
Attending: NURSE PRACTITIONER
Payer: COMMERCIAL

## 2017-11-20 ENCOUNTER — TELEPHONE (OUTPATIENT)
Dept: VASCULAR LAB | Facility: MEDICAL CENTER | Age: 27
End: 2017-11-20

## 2017-11-20 DIAGNOSIS — I51.3 THROMBUS OF LEFT ATRIAL APPENDAGE WITHOUT ANTECEDENT MYOCARDIAL INFARCTION: ICD-10-CM

## 2017-11-20 LAB
INR PPP: 3.43 (ref 0.87–1.13)
PROTHROMBIN TIME: 34.3 SEC (ref 12–14.6)

## 2017-11-20 PROCEDURE — 85610 PROTHROMBIN TIME: CPT

## 2017-11-20 PROCEDURE — 36415 COLL VENOUS BLD VENIPUNCTURE: CPT

## 2017-11-21 ENCOUNTER — ANTICOAGULATION MONITORING (OUTPATIENT)
Dept: VASCULAR LAB | Facility: MEDICAL CENTER | Age: 27
End: 2017-11-21

## 2017-11-21 DIAGNOSIS — I48.0 PAROXYSMAL ATRIAL FIBRILLATION (HCC): ICD-10-CM

## 2017-11-21 DIAGNOSIS — I34.2 NON-RHEUMATIC MITRAL VALVE STENOSIS: ICD-10-CM

## 2017-11-21 NOTE — PROGRESS NOTES
Anticoagulation Summary  As of 11/21/2017    INR goal:   2.5-3.5   TTR:   69.1 % (2.4 y)   Today's INR:   3.43 (11/20/2017)   Maintenance plan:   10 mg (5 mg x 2) every day   Weekly total:   70 mg   Plan last modified:   Tan Helton, PharmD (10/10/2017)   Next INR check:   1/2/2018   Target end date:   Indefinite    Indications    Paroxysmal atrial fibrillation (CMS-HCC) [I48.0]  Mitral stenosis s/p Mechanical MVR [I05.0]             Anticoagulation Episode Summary     INR check location:   Outside Lab    Preferred lab:       Send INR reminders to:       Comments:   [facility]       Anticoagulation Care Providers     Provider Role Specialty Phone number    Bird Rodriguez D.O. Referring Family Medicine [phone]    Trey Dubon M.D. Referring Cardiac Surgery [phone]    Taj Allen, PharmD Responsible          Anticoagulation Patient Findings    Left voicemail message to report a therapeutic INR of 3..  Pt to continue with current warfarin dosing regimen. Requested pt contact the clinic for any s/s of unusual bleeding, bruising, clotting or any changes to diet or medication. FU 6 weeks.  Nina Membreno, PharmD

## 2017-11-28 ENCOUNTER — NON-PROVIDER VISIT (OUTPATIENT)
Dept: CARDIOLOGY | Facility: MEDICAL CENTER | Age: 27
End: 2017-11-28
Payer: COMMERCIAL

## 2017-11-28 DIAGNOSIS — Z95.0 PACEMAKER: ICD-10-CM

## 2017-11-28 PROCEDURE — 93280 PM DEVICE PROGR EVAL DUAL: CPT | Performed by: INTERNAL MEDICINE

## 2017-12-26 ENCOUNTER — ANTICOAGULATION MONITORING (OUTPATIENT)
Dept: VASCULAR LAB | Facility: MEDICAL CENTER | Age: 27
End: 2017-12-26

## 2017-12-26 ENCOUNTER — HOSPITAL ENCOUNTER (OUTPATIENT)
Dept: LAB | Facility: MEDICAL CENTER | Age: 27
End: 2017-12-26
Attending: NURSE PRACTITIONER
Payer: COMMERCIAL

## 2017-12-26 DIAGNOSIS — I51.3 THROMBUS OF LEFT ATRIAL APPENDAGE WITHOUT ANTECEDENT MYOCARDIAL INFARCTION: ICD-10-CM

## 2017-12-26 DIAGNOSIS — I48.0 PAROXYSMAL ATRIAL FIBRILLATION (HCC): ICD-10-CM

## 2017-12-26 LAB
INR PPP: 2.36 (ref 0.87–1.13)
PROTHROMBIN TIME: 25.5 SEC (ref 12–14.6)

## 2017-12-26 PROCEDURE — 36415 COLL VENOUS BLD VENIPUNCTURE: CPT

## 2017-12-26 PROCEDURE — 85610 PROTHROMBIN TIME: CPT

## 2017-12-26 NOTE — PROGRESS NOTES
Anticoagulation Summary  As of 12/26/2017    INR goal:   2.5-3.5   TTR:   69.7 % (2.5 y)   Today's INR:   2.36!   Maintenance plan:   10 mg (5 mg x 2) every day   Weekly total:   70 mg   Plan last modified:   Tan Helton, PharmD (10/10/2017)   Next INR check:   1/9/2018   Target end date:   Indefinite    Indications    Paroxysmal atrial fibrillation (CMS-HCC) [I48.0]  Mitral stenosis s/p Mechanical MVR [I05.0]             Anticoagulation Episode Summary     INR check location:   Outside Lab    Preferred lab:       Send INR reminders to:       Comments:   [facility]       Anticoagulation Care Providers     Provider Role Specialty Phone number    Bird Rodriguez D.O. Referring Family Medicine [phone]    Trey Dubon M.D. Referring Cardiac Surgery [phone]    Taj Allen, PharmD Responsible          Anticoagulation Patient Findings    Left voicemail message to report a SUB therapeutic INR of 2.36.  Pt to bolus today with 15 mg then continue with current warfarin dosing regimen. Requested pt contact the clinic for any s/s of unusual bleeding, bruising, clotting or any changes to diet or medication. FU 2 weeks.    aTj Allen, PharmD

## 2018-01-05 DIAGNOSIS — E78.00 HYPERCHOLESTEROLEMIA: ICD-10-CM

## 2018-01-05 DIAGNOSIS — G43.109 MIGRAINE WITH AURA AND WITHOUT STATUS MIGRAINOSUS, NOT INTRACTABLE: ICD-10-CM

## 2018-01-05 DIAGNOSIS — I48.0 PAF (PAROXYSMAL ATRIAL FIBRILLATION) (HCC): ICD-10-CM

## 2018-01-05 NOTE — TELEPHONE ENCOUNTER
Patient left  Voicemail on Abram Lorenzo's ma extension asking for Levothyroxine refill. Patient has not been seen by Abram and medication for her thyroid has not been filled either.

## 2018-01-07 RX ORDER — METOPROLOL SUCCINATE 25 MG/1
25 TABLET, EXTENDED RELEASE ORAL
Qty: 30 TAB | Refills: 0 | Status: SHIPPED | OUTPATIENT
Start: 2018-01-07 | End: 2018-02-27 | Stop reason: SDUPTHER

## 2018-01-07 RX ORDER — ATORVASTATIN CALCIUM 40 MG/1
40 TABLET, FILM COATED ORAL EVERY EVENING
Qty: 30 TAB | Refills: 0 | Status: SHIPPED | OUTPATIENT
Start: 2018-01-07 | End: 2018-02-27 | Stop reason: SDUPTHER

## 2018-01-07 RX ORDER — SUMATRIPTAN 20 MG/1
1 SPRAY NASAL PRN
Qty: 1 EACH | Refills: 0 | Status: SHIPPED | OUTPATIENT
Start: 2018-01-07 | End: 2019-09-12

## 2018-01-23 ENCOUNTER — HOSPITAL ENCOUNTER (OUTPATIENT)
Dept: LAB | Facility: MEDICAL CENTER | Age: 28
End: 2018-01-23
Attending: NURSE PRACTITIONER
Payer: COMMERCIAL

## 2018-01-23 LAB
INR PPP: 4.02 (ref 0.87–1.13)
PROTHROMBIN TIME: 38.9 SEC (ref 12–14.6)

## 2018-01-23 PROCEDURE — 85610 PROTHROMBIN TIME: CPT

## 2018-01-23 PROCEDURE — 36415 COLL VENOUS BLD VENIPUNCTURE: CPT

## 2018-01-24 ENCOUNTER — ANTICOAGULATION MONITORING (OUTPATIENT)
Dept: VASCULAR LAB | Facility: MEDICAL CENTER | Age: 28
End: 2018-01-24

## 2018-01-24 DIAGNOSIS — I48.0 PAROXYSMAL ATRIAL FIBRILLATION (HCC): ICD-10-CM

## 2018-01-24 NOTE — PROGRESS NOTES
Anticoagulation Summary  As of 1/24/2018    INR goal:   2.5-3.5   TTR:   69.5 % (2.6 y)   Today's INR:   4.02! (1/23/2018)   Maintenance plan:   10 mg (5 mg x 2) every day   Weekly total:   70 mg   Plan last modified:   Tan Helton, PharmD (10/10/2017)   Next INR check:   2/14/2018   Target end date:   Indefinite    Indications    Paroxysmal atrial fibrillation (CMS-HCC) [I48.0]  Mitral stenosis s/p Mechanical MVR [I05.0]             Anticoagulation Episode Summary     INR check location:   Outside Lab    Preferred lab:       Send INR reminders to:       Comments:   [facility]       Anticoagulation Care Providers     Provider Role Specialty Phone number    Bird Rodriguez D.O. Referring Family Medicine [phone]    Trey Dubon M.D. Referring Cardiac Surgery [phone]    Taj Allen, PharmD Responsible          Anticoagulation Patient Findings    Left voicemail message to report a SUPRA therapeutic INR of 4.  Pt to reduce today then continue with current warfarin dosing regimen. Requested pt contact the clinic for any s/s of unusual bleeding, bruising, clotting or any changes to diet or medication. FU 3 weeks.    Taj Allen, PharmD

## 2018-02-04 ENCOUNTER — TELEPHONE (OUTPATIENT)
Dept: MEDICAL GROUP | Facility: MEDICAL CENTER | Age: 28
End: 2018-02-04

## 2018-02-04 DIAGNOSIS — R77.1 ABNORMALITY OF GLOBULIN: ICD-10-CM

## 2018-02-04 NOTE — LETTER
February 12, 2018        Sameer Boston  [address]  [address]  [facility] [place] [address]        Dear Sameer:    Anne Snider's office has tried contacting you. At your earliest convenience please contact our office at [phone].      If you have any questions or concerns, please don't hesitate to call.        Sincerely,        MARCIA Rey.    Electronically Signed

## 2018-02-27 DIAGNOSIS — I48.0 PAF (PAROXYSMAL ATRIAL FIBRILLATION) (HCC): ICD-10-CM

## 2018-02-27 DIAGNOSIS — I48.91 ATRIAL FIBRILLATION, UNSPECIFIED TYPE (HCC): ICD-10-CM

## 2018-02-27 DIAGNOSIS — E78.00 HYPERCHOLESTEROLEMIA: ICD-10-CM

## 2018-02-27 RX ORDER — WARFARIN SODIUM 5 MG/1
TABLET ORAL
Qty: 180 TAB | Refills: 1 | Status: SHIPPED | OUTPATIENT
Start: 2018-02-27 | End: 2019-01-30

## 2018-02-27 RX ORDER — ATORVASTATIN CALCIUM 40 MG/1
40 TABLET, FILM COATED ORAL EVERY EVENING
Qty: 90 TAB | Refills: 1 | Status: SHIPPED | OUTPATIENT
Start: 2018-02-27 | End: 2018-12-08 | Stop reason: SDUPTHER

## 2018-02-27 RX ORDER — METOPROLOL SUCCINATE 25 MG/1
25 TABLET, EXTENDED RELEASE ORAL
Qty: 90 TAB | Refills: 1 | Status: SHIPPED | OUTPATIENT
Start: 2018-02-27 | End: 2018-10-31 | Stop reason: SDUPTHER

## 2018-02-28 ENCOUNTER — TELEPHONE (OUTPATIENT)
Dept: VASCULAR LAB | Facility: MEDICAL CENTER | Age: 28
End: 2018-02-28

## 2018-03-05 ENCOUNTER — HOSPITAL ENCOUNTER (OUTPATIENT)
Dept: LAB | Facility: MEDICAL CENTER | Age: 28
End: 2018-03-05
Attending: NURSE PRACTITIONER
Payer: COMMERCIAL

## 2018-03-05 DIAGNOSIS — I51.3 THROMBUS OF LEFT ATRIAL APPENDAGE WITHOUT ANTECEDENT MYOCARDIAL INFARCTION: ICD-10-CM

## 2018-03-05 LAB
INR PPP: 2.96 (ref 0.87–1.13)
PROTHROMBIN TIME: 30.5 SEC (ref 12–14.6)

## 2018-03-05 PROCEDURE — 36415 COLL VENOUS BLD VENIPUNCTURE: CPT

## 2018-03-05 PROCEDURE — 85610 PROTHROMBIN TIME: CPT

## 2018-03-06 ENCOUNTER — ANTICOAGULATION MONITORING (OUTPATIENT)
Dept: VASCULAR LAB | Facility: MEDICAL CENTER | Age: 28
End: 2018-03-06

## 2018-03-06 ENCOUNTER — NON-PROVIDER VISIT (OUTPATIENT)
Dept: CARDIOLOGY | Facility: MEDICAL CENTER | Age: 28
End: 2018-03-06
Payer: COMMERCIAL

## 2018-03-06 DIAGNOSIS — Z95.0 PACEMAKER: ICD-10-CM

## 2018-03-06 DIAGNOSIS — I05.0 MITRAL VALVE STENOSIS, UNSPECIFIED ETIOLOGY: ICD-10-CM

## 2018-03-06 DIAGNOSIS — I48.0 PAROXYSMAL ATRIAL FIBRILLATION (HCC): ICD-10-CM

## 2018-03-06 PROCEDURE — 93280 PM DEVICE PROGR EVAL DUAL: CPT | Performed by: INTERNAL MEDICINE

## 2018-03-06 NOTE — PROGRESS NOTES
OP Anticoagulation Service Note    Date: 3/6/2018  Anticoagulation Summary  As of 3/6/2018    INR goal:   2.5-3.5   TTR:   68.7 % (2.7 y)   Today's INR:   2.96 (3/5/2018)   Maintenance plan:   10 mg (5 mg x 2) every day   Weekly total:   70 mg   No change documented:   Zohaib De Guzman, Med Ass't   Plan last modified:   Tan Helton PharmD (10/10/2017)   Next INR check:   4/3/2018   Target end date:   Indefinite    Indications    Paroxysmal atrial fibrillation (CMS-HCC) [I48.0]  Mitral stenosis s/p Mechanical MVR [I05.0]             Anticoagulation Episode Summary     INR check location:   Outside Lab    Preferred lab:       Send INR reminders to:       Comments:   [facility]       Anticoagulation Care Providers     Provider Role Specialty Phone number    Bird Rodriguez D.O. Referring Family Medicine [phone]    Trey Dubon M.D. Referring Cardiac Surgery [phone]    Agata SantosD Responsible          Anticoagulation Patient Findings  Patient Findings     Negatives:   Signs/symptoms of thrombosis, Signs/symptoms of bleeding, Laboratory test error suspected, Change in health, Change in alcohol use, Change in activity, Upcoming invasive procedure, Emergency department visit, Upcoming dental procedure, Missed doses, Extra doses, Change in medications, Change in diet/appetite, Hospital admission, Bruising, Other complaints        Plan: Left patient a message. Patient is therapeutic and will remain on the same dose. Patient was instructed to call back if needed to report any unusual bleeding or bruising or any changes to medication or diet. Patient is to be checked again in 4 weeks.    Zohaib De Guzman  I have reviewed and agree with the plan above on  3/6/2018    Estrella Gonzales, Pharm D

## 2018-03-15 ENCOUNTER — TELEPHONE (OUTPATIENT)
Dept: MEDICAL GROUP | Facility: MEDICAL CENTER | Age: 28
End: 2018-03-15

## 2018-04-05 ENCOUNTER — HOSPITAL ENCOUNTER (OUTPATIENT)
Dept: LAB | Facility: MEDICAL CENTER | Age: 28
End: 2018-04-05
Attending: NURSE PRACTITIONER
Payer: COMMERCIAL

## 2018-04-05 LAB
INR PPP: 3.89 (ref 0.87–1.13)
PROTHROMBIN TIME: 37.9 SEC (ref 12–14.6)

## 2018-04-05 PROCEDURE — 85610 PROTHROMBIN TIME: CPT

## 2018-04-05 PROCEDURE — 36415 COLL VENOUS BLD VENIPUNCTURE: CPT

## 2018-04-06 ENCOUNTER — ANTICOAGULATION MONITORING (OUTPATIENT)
Dept: VASCULAR LAB | Facility: MEDICAL CENTER | Age: 28
End: 2018-04-06

## 2018-04-06 DIAGNOSIS — I05.0 MITRAL VALVE STENOSIS, UNSPECIFIED ETIOLOGY: ICD-10-CM

## 2018-04-06 DIAGNOSIS — I48.0 PAROXYSMAL ATRIAL FIBRILLATION (HCC): ICD-10-CM

## 2018-04-06 NOTE — PROGRESS NOTES
Anticoagulation Summary  As of 4/6/2018    INR goal:   2.5-3.5   TTR:   68.4 % (2.8 y)   Today's INR:   3.89! (4/5/2018)   Maintenance plan:   10 mg (5 mg x 2) every day   Weekly total:   70 mg   Plan last modified:   Tan Helton, PharmD (10/10/2017)   Next INR check:   4/20/2018   Target end date:   Indefinite    Indications    Paroxysmal atrial fibrillation (CMS-HCC) [I48.0]  Mitral stenosis s/p Mechanical MVR [I05.0]             Anticoagulation Episode Summary     INR check location:   Outside Lab    Preferred lab:       Send INR reminders to:       Comments:   [facility]       Anticoagulation Care Providers     Provider Role Specialty Phone number    Bird Rodriguez D.O. Referring Family Medicine [phone]    Trey Dubon M.D. Referring Cardiac Surgery [phone]    Taj Allen, PharmD Responsible          Anticoagulation Patient Findings  Patient Findings     Positives:   Change in diet/appetite    Negatives:   Signs/symptoms of thrombosis, Signs/symptoms of bleeding, Laboratory test error suspected, Change in health, Change in alcohol use, Change in activity, Upcoming invasive procedure, Emergency department visit, Upcoming dental procedure, Missed doses, Extra doses, Change in medications, Hospital admission, Bruising, Other complaints    Comments:   Less greens        Spoke with patient today regarding supratherapeutic INR of 3.89.  Patient denies any signs/symptoms of bruising or bleeding or any changes in diet and medications.  Instructed patient to call clinic with any questions or concerns.  She has been eating less greens over the last two weeks, plans on returning to normal intake.  Instructed patient to decrease today's dose to 5mg, then resume current warfarin regimen.  Follow up in 2 weeks, to reduce risk of adverse events related to this high risk medication,  Warfarin.    Everett Dangelo, PharmD

## 2018-05-16 ENCOUNTER — TELEPHONE (OUTPATIENT)
Dept: MEDICAL GROUP | Facility: MEDICAL CENTER | Age: 28
End: 2018-05-16

## 2018-05-16 NOTE — LETTER
May 23, 2018        Sameer Boston  [address]  [address]  [facility] [address]        Dear Sameer:    Anne Snider's office has tried contacting you. At your earliest convenience please contact our office at [phone].        If you have any questions or concerns, please don't hesitate to call.        Sincerely,        MARCIA Rey.    Electronically Signed

## 2018-05-21 ENCOUNTER — HOSPITAL ENCOUNTER (OUTPATIENT)
Dept: LAB | Facility: MEDICAL CENTER | Age: 28
End: 2018-05-21
Attending: NURSE PRACTITIONER
Payer: COMMERCIAL

## 2018-05-21 DIAGNOSIS — I51.3 THROMBUS OF LEFT ATRIAL APPENDAGE WITHOUT ANTECEDENT MYOCARDIAL INFARCTION: ICD-10-CM

## 2018-05-21 DIAGNOSIS — R77.1 ABNORMALITY OF GLOBULIN: ICD-10-CM

## 2018-05-21 LAB
INR PPP: 4.84 (ref 0.87–1.13)
PROTHROMBIN TIME: 45.1 SEC (ref 12–14.6)

## 2018-05-21 PROCEDURE — 82784 ASSAY IGA/IGD/IGG/IGM EACH: CPT

## 2018-05-21 PROCEDURE — 85610 PROTHROMBIN TIME: CPT

## 2018-05-21 PROCEDURE — 36415 COLL VENOUS BLD VENIPUNCTURE: CPT

## 2018-05-21 PROCEDURE — 84160 ASSAY OF PROTEIN ANY SOURCE: CPT

## 2018-05-21 PROCEDURE — 83883 ASSAY NEPHELOMETRY NOT SPEC: CPT | Mod: 91

## 2018-05-21 PROCEDURE — 84165 PROTEIN E-PHORESIS SERUM: CPT

## 2018-05-22 ENCOUNTER — ANTICOAGULATION MONITORING (OUTPATIENT)
Dept: VASCULAR LAB | Facility: MEDICAL CENTER | Age: 28
End: 2018-05-22

## 2018-05-22 DIAGNOSIS — I05.0 MITRAL VALVE STENOSIS, UNSPECIFIED ETIOLOGY: ICD-10-CM

## 2018-05-22 DIAGNOSIS — Z95.0 PACEMAKER: ICD-10-CM

## 2018-05-22 DIAGNOSIS — I48.0 PAROXYSMAL ATRIAL FIBRILLATION (HCC): ICD-10-CM

## 2018-05-22 DIAGNOSIS — I07.1 TRICUSPID VALVE INSUFFICIENCY, UNSPECIFIED ETIOLOGY: ICD-10-CM

## 2018-05-23 ENCOUNTER — NON-PROVIDER VISIT (OUTPATIENT)
Dept: CARDIOLOGY | Facility: MEDICAL CENTER | Age: 28
End: 2018-05-23
Payer: COMMERCIAL

## 2018-05-23 ENCOUNTER — OFFICE VISIT (OUTPATIENT)
Dept: CARDIOLOGY | Facility: MEDICAL CENTER | Age: 28
End: 2018-05-23
Payer: COMMERCIAL

## 2018-05-23 ENCOUNTER — TELEPHONE (OUTPATIENT)
Dept: CARDIOLOGY | Facility: MEDICAL CENTER | Age: 28
End: 2018-05-23

## 2018-05-23 VITALS
SYSTOLIC BLOOD PRESSURE: 118 MMHG | BODY MASS INDEX: 29.45 KG/M2 | HEART RATE: 79 BPM | HEIGHT: 60 IN | DIASTOLIC BLOOD PRESSURE: 72 MMHG | WEIGHT: 150 LBS | OXYGEN SATURATION: 100 %

## 2018-05-23 DIAGNOSIS — Z95.0 PACEMAKER: ICD-10-CM

## 2018-05-23 DIAGNOSIS — I05.0 MITRAL VALVE STENOSIS, UNSPECIFIED ETIOLOGY: ICD-10-CM

## 2018-05-23 DIAGNOSIS — I07.1 TRICUSPID VALVE INSUFFICIENCY, UNSPECIFIED ETIOLOGY: ICD-10-CM

## 2018-05-23 DIAGNOSIS — I63.9 CEREBRAL INFARCTION, UNSPECIFIED MECHANISM (HCC): ICD-10-CM

## 2018-05-23 DIAGNOSIS — Z79.01 CHRONIC ANTICOAGULATION: ICD-10-CM

## 2018-05-23 DIAGNOSIS — I51.3 THROMBSIS OF LEFT ATRIAL APPENDAGE WITHOUT ANTECEDENT MYOCARDIAL INFARCTION: ICD-10-CM

## 2018-05-23 LAB
IGA SERPL-MCNC: 445 MG/DL (ref 68–408)
IGG SERPL-MCNC: 1350 MG/DL (ref 768–1632)
IGM SERPL-MCNC: 191 MG/DL (ref 35–263)
KAPPA LC FREE SER-MCNC: 2.11 MG/DL (ref 0.33–1.94)
KAPPA LC FREE/LAMBDA FREE SER NEPH: 1.29 {RATIO} (ref 0.26–1.65)
LAMBDA LC FREE SERPL-MCNC: 1.63 MG/DL (ref 0.57–2.63)

## 2018-05-23 PROCEDURE — 93280 PM DEVICE PROGR EVAL DUAL: CPT | Performed by: INTERNAL MEDICINE

## 2018-05-23 PROCEDURE — 99204 OFFICE O/P NEW MOD 45 MIN: CPT | Performed by: INTERNAL MEDICINE

## 2018-05-23 ASSESSMENT — ENCOUNTER SYMPTOMS
WHEEZING: 0
HEMOPTYSIS: 0
ABDOMINAL PAIN: 0
PALPITATIONS: 0
BRUISES/BLEEDS EASILY: 0
FEVER: 0
VOMITING: 0
NAUSEA: 0
CHILLS: 0
NERVOUS/ANXIOUS: 0
EYE PAIN: 0
MYALGIAS: 0
BLURRED VISION: 0
DEPRESSION: 0
LOSS OF CONSCIOUSNESS: 0
EYE DISCHARGE: 0
COUGH: 0
SPEECH CHANGE: 0

## 2018-05-23 NOTE — LETTER
[facility]   [address], [address]  [place] [address]  Phone: [phone]  Fax: [phone]              Sameer Boston  1990    Encounter Date: 5/23/2018    Mathieu Hickey M.D.          PROGRESS NOTE:  Chief Complaint   Patient presents with   • Atrial Fibrillation     PP DX:AFIB       Subjective:   Sameer Boston is a 28 y.o. female who presents today with MVR mechanical  and TV repair for MS. CHB near [place]. On coumadin. [place]. No chest pain or SOB. Works in [employer] at [employer]. Grew up in the [facility]. Previous EFRAIN clot and CVA and MV surgery requiring surgery. Post opt CHB PPM by [facility]. Nl LV function. No SOB or chest pain.    Past Medical History:   Diagnosis Date   • Anemia 4/17/2011   • CHF (congestive heart failure) (HCC)    • Chronic anticoagulation    • CVA (cerebral infarction)    • Elbow fracture    • History of atrial fibrillation    • Hyperlipidemia    • Left atrial thrombus    • Migraine     occasional   • Mitral stenosis     s/p mechanical MVR on coumadin   • Pacemaker     [facility] for 3rd degree AVB   • Pulmonary hypertension (HCC)    • Third degree heart block (HCC)    • Tricuspid regurgitation     s/p repair     Past Surgical History:   Procedure Laterality Date   • ELBOW ORIF Right 1/26/2016    Procedure: ELBOW ORIF olecranon;  Surgeon: Fausto Nunez M.D.;  Location: SURGERY [facility];  Service:    • PELVISCOPY  9/6/2011    Performed by SHERRY HEREDIA at SURGERY SAME DAY [facility] ORS   • INTRA UTERINE DEVICE REMOVAL  9/6/2011    Performed by SHERRY HEREDIA at SURGERY SAME DAY [facility] ORS   • TUBAL COAGULATION LAPAROSCOPIC BILATERAL  9/6/2011    Performed by SHERRY HEREDIA at SURGERY SAME DAY [facility] ORS   • MITRAL VALVE REPLACE  4/25/2011    Performed by RALPH MCNEIL at SURGERY [facility]   • TRICUSPID VALVE REPAIR  4/25/2011    Performed by RALPH MCNEIL at SURGERY [facility] ORS     • MASS EXCISION GENERAL  4/25/2011    Performed by RALPH MCNEIL at SURGERY [facility] ORS   • PRIMARY C SECTION  4/6/2011    Performed by DEBBIE MAXWELL at LABOR AND DELIVERY     Family History   Problem Relation Age of Onset   • Diabetes Maternal Grandmother      IDDM   • Hypertension Maternal Grandmother      meds   • Heart Disease Maternal Grandfather    • Diabetes Paternal Grandmother      esrd     Social History     Social History   • Marital status: Single     Spouse name: N/A   • Number of children: N/A   • Years of education: N/A     Occupational History   • Not on file.     Social History Main Topics   • Smoking status: Never Smoker   • Smokeless tobacco: Never Used   • Alcohol use No   • Drug use: No   • Sexual activity: Not Currently     Partners: Male     Other Topics Concern   • Not on file     Social History Narrative   • No narrative on file     Allergies   Allergen Reactions   • No Known Drug Allergy      Outpatient Encounter Prescriptions as of 5/23/2018   Medication Sig Dispense Refill   • warfarin (COUMADIN) 5 MG Tab Take two tablets daily as directed by [facility] 180 Tab 1   • metoprolol SR (TOPROL XL) 25 MG TABLET SR 24 HR Take 1 Tab by mouth every bedtime. 90 Tab 1   • atorvastatin (LIPITOR) 40 MG Tab Take 1 Tab by mouth every evening. 90 Tab 1   • therapeutic multivitamin-minerals (THERAGRAN-M) Tab Take 1 Tab by mouth every day. 30 Tab 0   • Cholecalciferol (VITAMIN D) 2000 UNITS Cap Take 1 Cap by mouth every day. 30 Cap 0   • sumatriptan (IMITREX) 20 MG/ACT nasal spray Spray 1 Spray in nose as needed for Migraine. 1 Each 0     No facility-administered encounter medications on file as of 5/23/2018.      Review of Systems   Constitutional: Negative for chills and fever.   HENT: Negative for congestion.    Eyes: Negative for blurred vision, pain and discharge.   Respiratory: Negative for cough, hemoptysis and wheezing.    Cardiovascular: Negative for chest pain and  palpitations.   Gastrointestinal: Negative for abdominal pain, nausea and vomiting.   Musculoskeletal: Negative for joint pain and myalgias.   Skin: Negative for itching and rash.   Neurological: Negative for speech change and loss of consciousness.   Endo/Heme/Allergies: Does not bruise/bleed easily.   Psychiatric/Behavioral: Negative for depression. The patient is not nervous/anxious.    All other systems reviewed and are negative.       Objective:   /72   Pulse 79   Ht 1.524 m (5')   Wt 68 kg (150 lb)   SpO2 100%   BMI 29.29 kg/m²      Physical Exam   Constitutional: She is oriented to person, place, and time. She appears well-developed and well-nourished.   HENT:   Head: Normocephalic and atraumatic.   Eyes: EOM are normal. Pupils are equal, round, and reactive to light.   Neck: Normal range of motion. Neck supple.   Cardiovascular: Normal rate, regular rhythm, normal heart sounds and intact distal pulses.  Exam reveals no gallop and no friction rub.    No murmur heard.  mech s1   Pulmonary/Chest: Effort normal and breath sounds normal.   Abdominal: Soft. Bowel sounds are normal.   Musculoskeletal: Normal range of motion. She exhibits no edema.   Neurological: She is alert and oriented to person, place, and time.   Skin: Skin is warm.   Psychiatric: She has a normal mood and affect. Her behavior is normal. Judgment and thought content normal.       Assessment:     1. Tricuspid valve insufficiency, unspecified etiology     2. 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 Cerebral infarction, unspecified mechanism (HCC)     4. Mitral valve stenosis, unspecified etiology     5. Pacemaker [facility] for 3 degree heart block     6. Chronic anticoagulation         Medical Decision Making:  Today's Assessment / Status / Plan:   1. PPM near [place]. Schedule generator change. Hold coumadin one day but PT two days before surgery.  2. Previous CVA s/p EFRAIN ligation.  3. MVR and TV  repair. Nl LV function.  The risks, benefits, and alternatives to permanent pacemaker replacement were discussed in great detail.  Specific risks mentioned to the patient including bleeding, cardiac perforation with possible tamponade possibly requiring pericardiocentesis or open heart surgery.  In addition the possibility of lead dislodgment (2-3%), pneumothorax (3%), hemothorax, infection were discussed.  Also, mentioned were the risk of death, stroke, and myocardial infarction.  The patient verbalized understanding of the potential complications and wishes to proceed with the procedure.        Anne Snider, REA.P.N.  [address]  [address]  [facility] [address]  VIA In Basket

## 2018-05-23 NOTE — PROGRESS NOTES
Chief Complaint   Patient presents with   • Atrial Fibrillation     PP DX:AFIB       Subjective:   Sameer Boston is a 28 y.o. female who presents today with MVR mechanical  and TV repair for MS. CHB near [place]. On coumadin. Near [place]. No chest pain or SOB. Works in [employer]. Grew up in the [facility]. Previous EFRAIN clot and CVA and MV surgery requiring surgery. Post opt CHB PPM by [facility]. Nl LV function. No SOB or chest pain.    Past Medical History:   Diagnosis Date   • Anemia 4/17/2011   • CHF (congestive heart failure) (HCC)    • Chronic anticoagulation    • CVA (cerebral infarction)    • Elbow fracture    • History of atrial fibrillation    • Hyperlipidemia    • Left atrial thrombus    • Migraine     occasional   • Mitral stenosis     s/p mechanical MVR on coumadin   • Pacemaker     [place] for 3rd degree AVB   • Pulmonary hypertension (HCC)    • Third degree heart block (HCC)    • Tricuspid regurgitation     s/p repair     Past Surgical History:   Procedure Laterality Date   • ELBOW ORIF Right 1/26/2016    Procedure: ELBOW ORIF olecranon;  Surgeon: Fausto Nunez M.D.;  Location: SURGERY [facility];  Service:    • PELVISCOPY  9/6/2011    Performed by SHERRY HEREDIA at SURGERY SAME DAY [facility] ORS   • INTRA UTERINE DEVICE REMOVAL  9/6/2011    Performed by SHERRY HEREDIA at SURGERY SAME DAY [facility] ORS   • TUBAL COAGULATION LAPAROSCOPIC BILATERAL  9/6/2011    Performed by SHERRY HEREDIA at SURGERY SAME DAY [facility] ORS   • MITRAL VALVE REPLACE  4/25/2011    Performed by RALPH MCNEIL at SURGERY [facility] ORS   • TRICUSPID VALVE REPAIR  4/25/2011    Performed by RALPH MCNEIL at SURGERY [facility] ORS   • MASS EXCISION GENERAL  4/25/2011    Performed by RALPH MCNEIL at SURGERY [facility] ORS   • PRIMARY C SECTION  4/6/2011    Performed by DEBBIE MAXWELL at LABOR AND DELIVERY     Family History   Problem Relation Age of Onset   • Diabetes Maternal Grandmother       IDDM   • Hypertension Maternal Grandmother      meds   • Heart Disease Maternal Grandfather    • Diabetes Paternal Grandmother      esrd     Social History     Social History   • Marital status: Single     Spouse name: N/A   • Number of children: N/A   • Years of education: N/A     Occupational History   • Not on file.     Social History Main Topics   • Smoking status: Never Smoker   • Smokeless tobacco: Never Used   • Alcohol use No   • Drug use: No   • Sexual activity: Not Currently     Partners: Male     Other Topics Concern   • Not on file     Social History Narrative   • No narrative on file     Allergies   Allergen Reactions   • No Known Drug Allergy      Outpatient Encounter Prescriptions as of 5/23/2018   Medication Sig Dispense Refill   • warfarin (COUMADIN) 5 MG Tab Take two tablets daily as directed by [facility] 180 Tab 1   • metoprolol SR (TOPROL XL) 25 MG TABLET SR 24 HR Take 1 Tab by mouth every bedtime. 90 Tab 1   • atorvastatin (LIPITOR) 40 MG Tab Take 1 Tab by mouth every evening. 90 Tab 1   • therapeutic multivitamin-minerals (THERAGRAN-M) Tab Take 1 Tab by mouth every day. 30 Tab 0   • Cholecalciferol (VITAMIN D) 2000 UNITS Cap Take 1 Cap by mouth every day. 30 Cap 0   • sumatriptan (IMITREX) 20 MG/ACT nasal spray Spray 1 Spray in nose as needed for Migraine. 1 Each 0     No facility-administered encounter medications on file as of 5/23/2018.      Review of Systems   Constitutional: Negative for chills and fever.   HENT: Negative for congestion.    Eyes: Negative for blurred vision, pain and discharge.   Respiratory: Negative for cough, hemoptysis and wheezing.    Cardiovascular: Negative for chest pain and palpitations.   Gastrointestinal: Negative for abdominal pain, nausea and vomiting.   Musculoskeletal: Negative for joint pain and myalgias.   Skin: Negative for itching and rash.   Neurological: Negative for speech change and loss of consciousness.   Endo/Heme/Allergies:  Does not bruise/bleed easily.   Psychiatric/Behavioral: Negative for depression. The patient is not nervous/anxious.    All other systems reviewed and are negative.       Objective:   /72   Pulse 79   Ht 1.524 m (5')   Wt 68 kg (150 lb)   SpO2 100%   BMI 29.29 kg/m²     Physical Exam   Constitutional: She is oriented to person, place, and time. She appears well-developed and well-nourished.   HENT:   Head: Normocephalic and atraumatic.   Eyes: EOM are normal. Pupils are equal, round, and reactive to light.   Neck: Normal range of motion. Neck supple.   Cardiovascular: Normal rate, regular rhythm, normal heart sounds and intact distal pulses.  Exam reveals no gallop and no friction rub.    No murmur heard.  mech s1   Pulmonary/Chest: Effort normal and breath sounds normal.   Abdominal: Soft. Bowel sounds are normal.   Musculoskeletal: Normal range of motion. She exhibits no edema.   Neurological: She is alert and oriented to person, place, and time.   Skin: Skin is warm.   Psychiatric: She has a normal mood and affect. Her behavior is normal. Judgment and thought content normal.       Assessment:     1. Tricuspid valve insufficiency, unspecified etiology     2. 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 Cerebral infarction, unspecified mechanism (HCC)     4. Mitral valve stenosis, unspecified etiology     5. Pacemaker [facility] for 3 degree heart block     6. Chronic anticoagulation         Medical Decision Making:  Today's Assessment / Status / Plan:   1. PPM near [place]. Schedule generator change. Hold coumadin one day but PT two days before surgery.  2. Previous CVA s/p EFRAIN ligation.  3. MVR and TV repair. Nl LV function.  The risks, benefits, and alternatives to permanent pacemaker replacement were discussed in great detail.  Specific risks mentioned to the patient including bleeding, cardiac perforation with possible tamponade possibly requiring pericardiocentesis  or open heart surgery.  In addition the possibility of lead dislodgment (2-3%), pneumothorax (3%), hemothorax, infection were discussed.  Also, mentioned were the risk of death, stroke, and myocardial infarction.  The patient verbalized understanding of the potential complications and wishes to proceed with the procedure.

## 2018-05-24 ENCOUNTER — TELEPHONE (OUTPATIENT)
Dept: MEDICAL GROUP | Facility: MEDICAL CENTER | Age: 28
End: 2018-05-24

## 2018-05-24 LAB
ALBUMIN SERPL-MCNC: 4.51 G/DL (ref 3.75–5.01)
ALPHA1 GLOB SERPL ELPH-MCNC: 0.32 G/DL (ref 0.19–0.46)
ALPHA2 GLOB SERPL ELPH-MCNC: 0.66 G/DL (ref 0.48–1.05)
B-GLOBULIN SERPL ELPH-MCNC: 1.1 G/DL (ref 0.48–1.1)
EER PROT ELECT SER Q1092: ABNORMAL
GAMMA GLOB SERPL ELPH-MCNC: 1.71 G/DL (ref 0.62–1.51)
INTERPRETATION SERPL IFE-IMP: ABNORMAL
PROT SERPL-MCNC: 8.3 G/DL (ref 6–8.3)

## 2018-05-29 ENCOUNTER — TELEPHONE (OUTPATIENT)
Dept: MEDICAL GROUP | Facility: MEDICAL CENTER | Age: 28
End: 2018-05-29

## 2018-05-30 ENCOUNTER — HOSPITAL ENCOUNTER (OUTPATIENT)
Dept: LAB | Facility: MEDICAL CENTER | Age: 28
End: 2018-05-30
Attending: NURSE PRACTITIONER
Payer: COMMERCIAL

## 2018-05-30 LAB
INR PPP: 3.01 (ref 0.87–1.13)
PROTHROMBIN TIME: 30.9 SEC (ref 12–14.6)

## 2018-05-30 PROCEDURE — 85610 PROTHROMBIN TIME: CPT

## 2018-05-30 PROCEDURE — 36415 COLL VENOUS BLD VENIPUNCTURE: CPT

## 2018-05-30 NOTE — TELEPHONE ENCOUNTER
Govind Lacy M.D.  XAVI Rey; XAVI Campos is off today. Slightly high IgA, all else normal. Could be early MGUS but right now I would just repeat the immunoelectrophoresis in a year.   Dmitry    Previous Messages      ----- Message -----   From: XAVI Rey   Sent: 5/24/2018   1:03 PM   To: MARCIA Campos.     Do you mind taking a look at her lab.  i rechecked after 6 months.  All looks stable w/o issues but i wanted to be sure.         Jessy:  Please let pt know that i sent her test results to dr lacy for review.  He said to repeat in 1 yr.  We will call her when due.

## 2018-05-31 ENCOUNTER — ANTICOAGULATION MONITORING (OUTPATIENT)
Dept: VASCULAR LAB | Facility: MEDICAL CENTER | Age: 28
End: 2018-05-31

## 2018-05-31 DIAGNOSIS — I48.0 PAROXYSMAL ATRIAL FIBRILLATION (HCC): ICD-10-CM

## 2018-05-31 DIAGNOSIS — I34.2 NON-RHEUMATIC MITRAL VALVE STENOSIS: ICD-10-CM

## 2018-05-31 NOTE — PROGRESS NOTES
Anticoagulation Summary  As of 5/31/2018    INR goal:   2.5-3.5   TTR:   65.1 % (2.9 y)   Today's INR:   3.01 (5/30/2018)   Warfarin maintenance plan:   5 mg (5 mg x 1) on Wed; 10 mg (5 mg x 2) all other days   Weekly warfarin total:   65 mg   Plan last modified:   Agata AndrewsD (5/22/2018)   Next INR check:   6/13/2018   Target end date:   Indefinite    Indications    Paroxysmal atrial fibrillation (HCC) [I48.0]  Mitral stenosis s/p Mechanical MVR [I05.0]             Anticoagulation Episode Summary     INR check location:   Outside Lab    Preferred lab:       Send INR reminders to:       Comments:   [facility]       Anticoagulation Care Providers     Provider Role Specialty Phone number    Bird Rodriguez D.O. Referring Family Medicine [phone]    Trey Dubon M.D. Referring Cardiac Surgery [phone]    Taj Allen, PharmD Responsible          Anticoagulation Patient Findings    Left voicemail message to report a therapeutic INR of 3.01 .  Pt to continue with current warfarin dosing regimen. Requested pt contact the clinic for any s/s of unusual bleeding, bruising, clotting or any changes to diet or medication. FU 2 weeks.  Nina Membreno, PharmD

## 2018-06-28 ENCOUNTER — HOSPITAL ENCOUNTER (OUTPATIENT)
Dept: LAB | Facility: MEDICAL CENTER | Age: 28
End: 2018-06-28
Payer: COMMERCIAL

## 2018-06-28 ENCOUNTER — HOSPITAL ENCOUNTER (OUTPATIENT)
Dept: LAB | Facility: MEDICAL CENTER | Age: 28
End: 2018-06-28
Attending: NURSE PRACTITIONER
Payer: COMMERCIAL

## 2018-06-28 ENCOUNTER — ANTICOAGULATION MONITORING (OUTPATIENT)
Dept: VASCULAR LAB | Facility: MEDICAL CENTER | Age: 28
End: 2018-06-28

## 2018-06-28 DIAGNOSIS — I48.0 PAROXYSMAL ATRIAL FIBRILLATION (HCC): ICD-10-CM

## 2018-06-28 LAB
CHOLEST SERPL-MCNC: 174 MG/DL (ref 100–199)
DIABETES HTDIA: NO
EVENT NAME HTEVT: NORMAL
FASTING HTFAS: YES
GLUCOSE SERPL-MCNC: 97 MG/DL (ref 65–99)
HDLC SERPL-MCNC: 52 MG/DL
INR PPP: 4.6 (ref 0.87–1.13)
LDLC SERPL CALC-MCNC: 93 MG/DL
PROTHROMBIN TIME: 43.3 SEC (ref 12–14.6)
SCREENING LOC CITY HTCIT: NORMAL
SCREENING LOC STATE HTSTA: NORMAL
SCREENING LOCATION HTLOC: NORMAL
SMOKING HTSMO: NO
SUBSCRIBER ID HTSID: NORMAL
TRIGL SERPL-MCNC: 146 MG/DL (ref 0–149)

## 2018-06-28 PROCEDURE — 36415 COLL VENOUS BLD VENIPUNCTURE: CPT

## 2018-06-28 PROCEDURE — 85610 PROTHROMBIN TIME: CPT

## 2018-06-28 PROCEDURE — S5190 WELLNESS ASSESSMENT BY NONPH: HCPCS

## 2018-06-28 PROCEDURE — 82947 ASSAY GLUCOSE BLOOD QUANT: CPT

## 2018-06-28 PROCEDURE — 80061 LIPID PANEL: CPT

## 2018-06-28 NOTE — PROGRESS NOTES
Anticoagulation Summary  As of 6/28/2018    INR goal:   2.5-3.5   TTR:   64.2 % (3 y)   Today's INR:   4.60!   Warfarin maintenance plan:   5 mg (5 mg x 1) on Wed; 10 mg (5 mg x 2) all other days   Weekly warfarin total:   65 mg   Plan last modified:   Tan Helton PharmD (5/22/2018)   Next INR check:   7/5/2018   Target end date:   Indefinite    Indications    Paroxysmal atrial fibrillation (HCC) [I48.0]  Mitral stenosis s/p Mechanical MVR [I05.0]             Anticoagulation Episode Summary     INR check location:   Outside Lab    Preferred lab:       Send INR reminders to:       Comments:   [facility]       Anticoagulation Care Providers     Provider Role Specialty Phone number    Bird Rodriguez D.O. Referring Family Medicine [phone]    Trey Dubon M.D. Referring Cardiac Surgery [phone]    Taj Allen, PharmD Responsible          Anticoagulation Patient Findings      INR  supra-therapeutic.   Left a voice message for the patient, asked patient to please call the anticoagulation clinic if they have any signs/symptoms of bleeding and/or thrombosis or any changes to diet or medications.    Follow up appointment in 1 week(s)    Hold tonight then continue weekly warfarin dose as noted      Tan Helton, [place]

## 2018-06-29 LAB
BDY FAT % MEASURED: NORMAL %
BP DIAS: 67 MMHG
BP SYS: 105 MMHG
HYPERTENSION HTHYP: NO

## 2018-07-13 ENCOUNTER — HOSPITAL ENCOUNTER (OUTPATIENT)
Dept: LAB | Facility: MEDICAL CENTER | Age: 28
End: 2018-07-13
Attending: NURSE PRACTITIONER
Payer: COMMERCIAL

## 2018-07-13 ENCOUNTER — ANTICOAGULATION MONITORING (OUTPATIENT)
Dept: VASCULAR LAB | Facility: MEDICAL CENTER | Age: 28
End: 2018-07-13

## 2018-07-13 DIAGNOSIS — I05.0 MITRAL VALVE STENOSIS, UNSPECIFIED ETIOLOGY: ICD-10-CM

## 2018-07-13 DIAGNOSIS — I51.3 THROMBUS OF LEFT ATRIAL APPENDAGE WITHOUT ANTECEDENT MYOCARDIAL INFARCTION: ICD-10-CM

## 2018-07-13 DIAGNOSIS — I48.0 PAROXYSMAL ATRIAL FIBRILLATION (HCC): ICD-10-CM

## 2018-07-13 LAB
INR PPP: 2.7 (ref 0.87–1.13)
PROTHROMBIN TIME: 28.4 SEC (ref 12–14.6)

## 2018-07-13 PROCEDURE — 36415 COLL VENOUS BLD VENIPUNCTURE: CPT

## 2018-07-13 PROCEDURE — 85610 PROTHROMBIN TIME: CPT

## 2018-07-13 NOTE — PROGRESS NOTES
OP Anticoagulation Service Note    Date: 7/13/2018  Anticoagulation Summary  As of 7/13/2018    INR goal:   2.5-3.5   TTR:   63.9 % (3 y)   Today's INR:   2.70   Warfarin maintenance plan:   5 mg (5 mg x 1) on Wed; 10 mg (5 mg x 2) all other days   Weekly warfarin total:   65 mg   Plan last modified:   Tan Helton, PharmD (5/22/2018)   Next INR check:   7/25/2018   Target end date:   Indefinite    Indications    Paroxysmal atrial fibrillation (HCC) [I48.0]  Mitral stenosis s/p Mechanical MVR [I05.0]             Anticoagulation Episode Summary     INR check location:   Outside Lab    Preferred lab:       Send INR reminders to:       Comments:   [facility]       Anticoagulation Care Providers     Provider Role Specialty Phone number    Bird Rodriguez D.O. Referring Family Medicine [phone]    Trey Dubon M.D. Referring Cardiac Surgery [phone]    Taj Allen, PharmD Responsible          Anticoagulation Patient Findings  Patient Findings     Negatives:   Signs/symptoms of thrombosis, Signs/symptoms of bleeding, Laboratory test error suspected, Change in health, Change in alcohol use, Change in activity, Upcoming invasive procedure, Emergency department visit, Upcoming dental procedure, Missed doses, Extra doses, Change in medications, Change in diet/appetite, Hospital admission, Bruising, Other complaints        Plan: Left patient a message. Patient is therapeutic and will remain on the same dose. Patient was instructed to call back if needed to report any unusual bleeding or bruising or any changes to medication or diet. Patient is to be checked again in 2 weeks.    Mike Case. [facility]    I have reviewed and am in agreement with the above stated plan on 7-13-18.  Everett Dangelo, PharmD

## 2018-07-16 ENCOUNTER — TELEPHONE (OUTPATIENT)
Dept: CARDIOLOGY | Facility: MEDICAL CENTER | Age: 28
End: 2018-07-16

## 2018-07-16 DIAGNOSIS — Z01.812 PRE-OPERATIVE LABORATORY EXAMINATION: ICD-10-CM

## 2018-07-16 DIAGNOSIS — Z01.810 PRE-OPERATIVE CARDIOVASCULAR EXAMINATION: ICD-10-CM

## 2018-07-16 LAB
ALBUMIN SERPL BCP-MCNC: 4.7 G/DL (ref 3.2–4.9)
ALBUMIN/GLOB SERPL: 1.2 G/DL
ALP SERPL-CCNC: 73 U/L (ref 30–99)
ALT SERPL-CCNC: 22 U/L (ref 2–50)
ANION GAP SERPL CALC-SCNC: 10 MMOL/L (ref 0–11.9)
AST SERPL-CCNC: 25 U/L (ref 12–45)
BILIRUB SERPL-MCNC: 0.4 MG/DL (ref 0.1–1.5)
BUN SERPL-MCNC: 10 MG/DL (ref 8–22)
CALCIUM SERPL-MCNC: 10.2 MG/DL (ref 8.5–10.5)
CHLORIDE SERPL-SCNC: 102 MMOL/L (ref 96–112)
CO2 SERPL-SCNC: 25 MMOL/L (ref 20–33)
CREAT SERPL-MCNC: 0.7 MG/DL (ref 0.5–1.4)
ERYTHROCYTE [DISTWIDTH] IN BLOOD BY AUTOMATED COUNT: 45.3 FL (ref 35.9–50)
GLOBULIN SER CALC-MCNC: 4 G/DL (ref 1.9–3.5)
GLUCOSE SERPL-MCNC: 82 MG/DL (ref 65–99)
HCT VFR BLD AUTO: 44.1 % (ref 37–47)
HGB BLD-MCNC: 14.6 G/DL (ref 12–16)
INR PPP: 1.8 (ref 0.87–1.13)
MCH RBC QN AUTO: 29.9 PG (ref 27–33)
MCHC RBC AUTO-ENTMCNC: 33.1 G/DL (ref 33.6–35)
MCV RBC AUTO: 90.2 FL (ref 81.4–97.8)
PLATELET # BLD AUTO: 263 K/UL (ref 164–446)
PMV BLD AUTO: 10.7 FL (ref 9–12.9)
POTASSIUM SERPL-SCNC: 4.3 MMOL/L (ref 3.6–5.5)
PROT SERPL-MCNC: 8.7 G/DL (ref 6–8.2)
PROTHROMBIN TIME: 20.6 SEC (ref 12–14.6)
RBC # BLD AUTO: 4.89 M/UL (ref 4.2–5.4)
SODIUM SERPL-SCNC: 137 MMOL/L (ref 135–145)
WBC # BLD AUTO: 8.1 K/UL (ref 4.8–10.8)

## 2018-07-16 PROCEDURE — 36415 COLL VENOUS BLD VENIPUNCTURE: CPT

## 2018-07-16 PROCEDURE — 80053 COMPREHEN METABOLIC PANEL: CPT

## 2018-07-16 PROCEDURE — 93010 ELECTROCARDIOGRAM REPORT: CPT | Performed by: INTERNAL MEDICINE

## 2018-07-16 PROCEDURE — 85610 PROTHROMBIN TIME: CPT

## 2018-07-16 PROCEDURE — 85027 COMPLETE CBC AUTOMATED: CPT

## 2018-07-16 PROCEDURE — 93005 ELECTROCARDIOGRAM TRACING: CPT

## 2018-07-16 RX ORDER — ACETAMINOPHEN 325 MG/1
325-650 TABLET ORAL PRN
COMMUNITY
End: 2020-01-14

## 2018-07-17 LAB — EKG IMPRESSION: NORMAL

## 2018-07-17 NOTE — TELEPHONE ENCOUNTER
EKG   Order: [id number]   Status:  Final result   Visible to patient:  No (Not Released) Dx:  Pre-operative cardiovascular examinat...   Notes recorded by Rose Porras R.N. on 7/17/2018 at 8:57 AM PDT  Pt called and advised to take coumadin tonight.  ------    Notes recorded by Mathieu Hickey M.D. on 7/16/2018 at 6:53 PM PDT  Make sure is taking her coumadin

## 2018-07-17 NOTE — TELEPHONE ENCOUNTER
----- Message from Rose Porras R.N. sent at 7/16/2018  9:08 AM PDT -----  Regarding: FW: Check INR      ----- Message -----  From: Jessica Venegas, Med Ass't  Sent: 7/16/2018  To: Rose Porras R.N.  Subject: Check INR                                        Rose,    This patient is scheduled for a gen change on 7-18-18. She has been instructed to hold her Coumadin 1 dose prior. Per Dr. Hickey, she is pre admitting on 7-16-18. Please check INR to make sure she doesn't need to hold 2 days, instead of 1. She is pre admitting at 4:15.    Thank You,  Jessica

## 2018-07-18 ENCOUNTER — TELEPHONE (OUTPATIENT)
Dept: CARDIOLOGY | Facility: MEDICAL CENTER | Age: 28
End: 2018-07-18

## 2018-07-18 ENCOUNTER — HOSPITAL ENCOUNTER (OUTPATIENT)
Facility: MEDICAL CENTER | Age: 28
End: 2018-07-18
Attending: INTERNAL MEDICINE | Admitting: INTERNAL MEDICINE
Payer: COMMERCIAL

## 2018-07-18 VITALS
OXYGEN SATURATION: 97 % | RESPIRATION RATE: 17 BRPM | HEART RATE: 75 BPM | SYSTOLIC BLOOD PRESSURE: 98 MMHG | WEIGHT: 140 LBS | TEMPERATURE: 98.4 F | DIASTOLIC BLOOD PRESSURE: 54 MMHG | HEIGHT: 62 IN | BODY MASS INDEX: 25.76 KG/M2

## 2018-07-18 DIAGNOSIS — Z45.010 PACEMAKER AT END OF BATTERY LIFE: ICD-10-CM

## 2018-07-18 LAB
HCG SERPL QL: NEGATIVE
INR PPP: 2.21 (ref 0.87–1.13)
PROTHROMBIN TIME: 24.2 SEC (ref 12–14.6)

## 2018-07-18 PROCEDURE — 84703 CHORIONIC GONADOTROPIN ASSAY: CPT

## 2018-07-18 PROCEDURE — 700101 HCHG RX REV CODE 250

## 2018-07-18 PROCEDURE — 160002 HCHG RECOVERY MINUTES (STAT)

## 2018-07-18 PROCEDURE — 99153 MOD SED SAME PHYS/QHP EA: CPT

## 2018-07-18 PROCEDURE — 700111 HCHG RX REV CODE 636 W/ 250 OVERRIDE (IP)

## 2018-07-18 PROCEDURE — 305387 HCHG SUTURES

## 2018-07-18 PROCEDURE — 304952 HCHG R 2 PADS

## 2018-07-18 PROCEDURE — 304853 HCHG PPM TEST CABLE

## 2018-07-18 PROCEDURE — C1785 PMKR, DUAL, RATE-RESP: HCPCS

## 2018-07-18 PROCEDURE — 85610 PROTHROMBIN TIME: CPT

## 2018-07-18 PROCEDURE — 33228 REMV&REPLC PM GEN DUAL LEAD: CPT

## 2018-07-18 PROCEDURE — 99152 MOD SED SAME PHYS/QHP 5/>YRS: CPT

## 2018-07-18 RX ORDER — CEFAZOLIN SODIUM 1 G/3ML
INJECTION, POWDER, FOR SOLUTION INTRAMUSCULAR; INTRAVENOUS
Status: COMPLETED
Start: 2018-07-18 | End: 2018-07-18

## 2018-07-18 RX ORDER — DOXYCYCLINE HYCLATE 100 MG/1
100 CAPSULE ORAL 2 TIMES DAILY
Qty: 8 CAP | Refills: 0 | Status: SHIPPED | OUTPATIENT
Start: 2018-07-18 | End: 2019-09-12

## 2018-07-18 RX ORDER — MIDAZOLAM HYDROCHLORIDE 1 MG/ML
INJECTION INTRAMUSCULAR; INTRAVENOUS
Status: COMPLETED
Start: 2018-07-18 | End: 2018-07-18

## 2018-07-18 RX ORDER — DOXYCYCLINE 100 MG/1
100 CAPSULE ORAL 2 TIMES DAILY
Qty: 8 CAP | Refills: 0 | Status: SHIPPED | OUTPATIENT
Start: 2018-07-18 | End: 2018-07-18

## 2018-07-18 RX ORDER — LIDOCAINE HYDROCHLORIDE 20 MG/ML
INJECTION, SOLUTION INFILTRATION; PERINEURAL
Status: COMPLETED
Start: 2018-07-18 | End: 2018-07-18

## 2018-07-18 RX ORDER — OXYCODONE HYDROCHLORIDE AND ACETAMINOPHEN 5; 325 MG/1; MG/1
1-2 TABLET ORAL EVERY 4 HOURS PRN
Status: DISCONTINUED | OUTPATIENT
Start: 2018-07-18 | End: 2018-07-18 | Stop reason: HOSPADM

## 2018-07-18 RX ORDER — ACETAMINOPHEN 325 MG/1
650 TABLET ORAL EVERY 4 HOURS PRN
Status: DISCONTINUED | OUTPATIENT
Start: 2018-07-18 | End: 2018-07-18 | Stop reason: HOSPADM

## 2018-07-18 RX ORDER — DIPHENHYDRAMINE HYDROCHLORIDE 50 MG/ML
INJECTION INTRAMUSCULAR; INTRAVENOUS
Status: COMPLETED
Start: 2018-07-18 | End: 2018-07-18

## 2018-07-18 RX ADMIN — MIDAZOLAM HYDROCHLORIDE 4 MG: 1 INJECTION, SOLUTION INTRAMUSCULAR; INTRAVENOUS at 07:59

## 2018-07-18 RX ADMIN — CEFAZOLIN 3000 MG: 330 INJECTION, POWDER, FOR SOLUTION INTRAMUSCULAR; INTRAVENOUS at 07:29

## 2018-07-18 RX ADMIN — LIDOCAINE HYDROCHLORIDE: 20 INJECTION, SOLUTION INFILTRATION; PERINEURAL at 07:29

## 2018-07-18 RX ADMIN — MIDAZOLAM HYDROCHLORIDE 1 MG: 1 INJECTION, SOLUTION INTRAMUSCULAR; INTRAVENOUS at 08:11

## 2018-07-18 RX ADMIN — FENTANYL CITRATE 100 MCG: 50 INJECTION, SOLUTION INTRAMUSCULAR; INTRAVENOUS at 07:59

## 2018-07-18 RX ADMIN — FENTANYL CITRATE 100 MCG: 50 INJECTION, SOLUTION INTRAMUSCULAR; INTRAVENOUS at 08:02

## 2018-07-18 RX ADMIN — DIPHENHYDRAMINE HYDROCHLORIDE 50 MG: 50 INJECTION, SOLUTION INTRAMUSCULAR; INTRAVENOUS at 07:53

## 2018-07-18 ASSESSMENT — PAIN SCALES - GENERAL
PAINLEVEL_OUTOF10: 0

## 2018-07-18 NOTE — TELEPHONE ENCOUNTER
We did submit prior auth but if you want to switch Doxycycline hyclate does not require prior auth. To DS to advise

## 2018-07-18 NOTE — TELEPHONE ENCOUNTER
----- Message from Lisa Schrader sent at 7/18/2018 10:57 AM PDT -----  Regarding: medication options to discuss  Contact: [phone]  JANAE/rob Limon at [facility] calling about doxycycline (MONODOX) 100 MG capsule.  The version ordered by JANAE requires prior drug therapy, [facility] as an option to discuss which does not require drug therapy. Please call [facility] at .

## 2018-07-18 NOTE — TELEPHONE ENCOUNTER
PAR sent to patient's plan:  Sameer Boston ([place]: [id number]) - [id number]       Status   Sent to [facility]   Next Steps   The plan will fax you a determination, typically within 1 to 5 business days.  How do I follow up?   DrugDoxycycline Monohydrate 100MG capsules   FormHometownRx Medication Request FormTo obtain coverage for a formulary drug requiring prior authorization ([place]), a non-formulary drug for which there is no suitable alternative available, or any overrides of pharmacy management procedures such as step therapy, quantity limit, or other [phone]   Original Claim Info88

## 2018-07-18 NOTE — DISCHARGE INSTRUCTIONS
ACTIVITY: Rest and take it easy for the first 24 hours.  A responsible adult is recommended to remain with you during that time.  It is normal to feel sleepy.  We encourage you to not do anything that requires balance, judgment or coordination.    MILD FLU-LIKE SYMPTOMS ARE NORMAL. YOU MAY EXPERIENCE GENERALIZED MUSCLE ACHES, THROAT IRRITATION, HEADACHE AND/OR SOME NAUSEA.    FOR 24 HOURS DO NOT:  Drive, operate machinery or run household appliances.  Drink beer or alcoholic beverages.   Make important decisions or sign legal documents.    SPECIAL INSTRUCTIONS: **KEEP INCISION DRY FOR 7 DAYS*    DIET: To avoid nausea, slowly advance diet as tolerated, avoiding spicy or greasy foods for the first day.  Add more substantial food to your diet according to your physician's instructions.  Babies can be fed formula or breast milk as soon as they are hungry.  INCREASE FLUIDS AND FIBER TO AVOID CONSTIPATION.    SURGICAL DRESSING/BATHING: *NO FULL SHOWER NOR BATHTUB OR SWIMMING FOR 7 DAYS**    FOLLOW-UP APPOINTMENT:  A follow-up appointment should be arranged with your doctor in *DR [facility]   [phone]**; call to schedule.    You should CALL YOUR PHYSICIAN if you develop:  Fever greater than 101 degrees F.  Pain not relieved by medication, or persistent nausea or vomiting.  Excessive bleeding (blood soaking through dressing) or unexpected drainage from the wound.  Extreme redness or swelling around the incision site, drainage of pus or foul smelling drainage.  Inability to urinate or empty your bladder within 8 hours.  Problems with breathing or chest pain.    You should call 911 if you develop problems with breathing or chest pain.  If you are unable to contact your doctor or surgical center, you should go to the nearest emergency room or urgent care center.  Physician's telephone #: *[phone]**    If any questions arise, call your doctor.  If your doctor is not available, please feel free to call the Surgical Center at  [phone].  The Center is open Monday through Friday from 7AM to 7PM.  You can also call the HEALTH HOTLINE open 24 hours/day, 7 days/week and speak to a nurse at [phone], or toll free at [phone].    A registered nurse may call you a few days after your surgery to see how you are doing after your procedure.    MEDICATIONS: Resume taking daily medication.  Take prescribed pain medication with food.  If no medication is prescribed, you may take non-aspirin pain medication if needed.  PAIN MEDICATION CAN BE VERY CONSTIPATING.  Take a stool softener or laxative such as senokot, pericolace, or milk of magnesia if needed.      If your physician has prescribed pain medication that includes Acetaminophen (Tylenol), do not take additional Acetaminophen (Tylenol) while taking the prescribed medication.    Depression / Suicide Risk    As you are discharged from this [facility] facility, it is important to learn how to keep safe from harming yourself.    Recognize the warning signs:  · Abrupt changes in personality, positive or negative- including increase in energy   · Giving away possessions  · Change in eating patterns- significant weight changes-  positive or negative  · Change in sleeping patterns- unable to sleep or sleeping all the time   · Unwillingness or inability to communicate  · Depression  · Unusual sadness, discouragement and loneliness  · Talk of wanting to die  · Neglect of personal appearance   · Rebelliousness- reckless behavior  · Withdrawal from people/activities they love  · Confusion- inability to concentrate     If you or a loved one observes any of these behaviors or has concerns about self-harm, here's what you can do:  · Talk about it- your feelings and reasons for harming yourself  · Remove any means that you might use to hurt yourself (examples: pills, rope, extension cords, firearm)  · Get professional help from the community (Mental Health, Substance Abuse, psychological  counseling)  · Do not be alone:Call your Safe Contact- someone whom you trust who will be there for you.  · Call your local CRISIS HOTLINE 350-7492 or 201-251-7054  · Call your local Children's Mobile Crisis Response Team [place] (406) 698-3999 or www.AdKeeper  · Call the toll free National Suicide Prevention Hotlines   · National Suicide Prevention Lifeline 750-313-LREW (7495)  · National Hope Line Network 800-SUICIDE (768-1269)

## 2018-07-18 NOTE — OR NURSING
0834   PATIENT RECEIVED FROM CATH LAB,  S/P GENERATOR CHANGED.  PATIENT IS VERY DROWSY BUT AROUSABLE.  DENIES ANY PAIN.  DRESSING INTACT. NO FAMILY @ BEDSIDE.    0920   PATIENT IS STILL SUPER SLEEPY.  RESPONDS TO COMMANDS.     1000   FAMILY IS HERE @ BEDSIDE. PATIENT STARTS TO WAKE UP MORE,  TAKING SIPS OF WATER AND JUICE.  DENIES ANY PAIN.    1030   DC INSTRUCTIONS GIVEN TO PATIENT AND FAMILY.  VERBALIZED UNDERSTANDING OF ALL.  HL DC.  PPM BOOKLET GIVEN TO MOM.  PATIENT DC TO HOME,  VIA W/C, ESCORTED OUT BY VOLUNTEER.

## 2018-07-18 NOTE — H&P
Physician H&P    Patient ID:  Sameer Simms Ermac  [id number]  28 y.o. female  1990    History:  Primary Diagnosis: PPM at [place]. CHB    HPI:  CHB s/p MVR. Keloid. No chest pain or SOB. No constitutional symptoms. On coumadin.    Past Medical History:  has a past medical history of Anemia (4/17/2011); CHF (congestive heart failure) (HCC); Chronic anticoagulation; CVA (cerebral infarction) (2011); Elbow fracture; History of atrial fibrillation; Hyperlipidemia; Left atrial thrombus; Migraine; Mitral stenosis; Pacemaker; Pulmonary hypertension (HCC); Third degree heart block (HCC); and Tricuspid regurgitation. She also has no past medical history of Encounter for long-term (current) use of other medications.  Past Surgical History:  has a past surgical history that includes primary c section (4/6/2011); mitral valve replace (4/25/2011); tricuspid valve repair (4/25/2011); mass excision general (4/25/2011); pelviscopy (9/6/2011); intra uterine device removal (9/6/2011); tubal coagulation laparoscopic bilateral (9/6/2011); elbow orif (Right, 1/26/2016); and pacemaker insertion (2011).  Past Social History:  reports that she has never smoked. She has never used smokeless tobacco. She reports that she does not drink alcohol or use drugs.  Past Family History:   Family History   Problem Relation Age of Onset   • Diabetes Maternal Grandmother      IDDM   • Hypertension Maternal Grandmother      meds   • Heart Disease Maternal Grandfather    • Diabetes Paternal Grandmother      esrd     Allergies: No known drug allergy    Current Medications:  Prior to Admission medications    Medication Sig Start Date End Date Taking? Authorizing Provider   acetaminophen (TYLENOL) 325 MG Tab Take 325-650 mg by mouth as needed (headache).   Yes Physician Outpatient   warfarin (COUMADIN) 5 MG Tab Take two tablets daily as directed by [facility] 2/27/18   Kendra Leonardo A.P.N.   metoprolol SR (TOPROL XL) 25 MG TABLET  "SR 24 HR Take 1 Tab by mouth every bedtime. 2/27/18   Anna Mahmood M.D.   atorvastatin (LIPITOR) 40 MG Tab Take 1 Tab by mouth every evening. 2/27/18   Anna Mahmood M.D.   sumatriptan (IMITREX) 20 MG/ACT nasal spray Spray 1 Spray in nose as needed for Migraine. 1/7/18   Edilberto Jeffries M.D.   therapeutic multivitamin-minerals (THERAGRAN-M) Tab Take 1 Tab by mouth every day. 8/25/16   TERI ReyPHerminioNHerminio   Cholecalciferol (VITAMIN D) 2000 UNITS Cap Take 1 Cap by mouth every day. 8/25/16   TERI ReyPELOINA       Review of Systems:  FLAVIO  Height 1.575 m (5' 2\"), weight 69.1 kg (152 lb 5.4 oz), last menstrual period 07/01/2018.    Physical Examination:  Physical Exam   Constitutional: She is oriented to person, place, and time. She appears well-developed. No distress.   HENT:   Mouth/Throat: Mucous membranes are normal.   Eyes: Conjunctivae and EOM are normal.   Neck: No JVD present. No tracheal deviation present. No thyroid mass and no thyromegaly present.   Cardiovascular: Normal rate, regular rhythm and intact distal pulses.    No murmur heard.  Mech S1   Pulmonary/Chest: Effort normal and breath sounds normal. No respiratory distress. She exhibits no tenderness.   Large keloid   Abdominal: Soft. There is no tenderness.   Musculoskeletal: Normal range of motion. She exhibits no edema.   Neurological: She is alert and oriented to person, place, and time. She has normal strength. She displays no tremor.   Skin: Skin is warm and dry. She is not diaphoretic.   Psychiatric: She has a normal mood and affect. Her behavior is normal.   Vitals reviewed.      Impression:  1. PPM at [place] for generator change.  .The risks, benefits, and alternatives to permanent pacemaker replacement were discussed in great detail.  Specific risks mentioned to the patient including bleeding, cardiac perforation with possible tamponade possibly requiring pericardiocentesis or open heart surgery.  In addition the possibility of " lead dislodgment (2-3%), pneumothorax (3%), hemothorax, infection were discussed.  Also, mentioned were the risk of death, stroke, and myocardial infarction.  The patient verbalized understanding of the potential complications and wishes to proceed with the procedure.  2. Consider plastic referral in future for keloid removal.        Pre Procedure Assessment:  Pre-Procedure Assessment - Applicable for patients receiving sedation by non-anesthesia practitioner.  Previous drug history, other anesthetic experiences, potential anesthetic problems and choice of anesthesia assessed, discussed with patient.     No prior complications.  Results of relevant diagnostic studies not reviewed.    Plan of Sedation:  IV conscious sedationPatient assessed immediately prior to sedationPatient deemed appropriate candidate for conscious sedation options and plans discussed with patient / family    ASA 3 - Patient with severe systemic disease

## 2018-07-18 NOTE — OP REPORT
Electrophysiology Procedure Note  [facility]    PROCEDURE:  DDDR PPM generator change,   moderate sedation administered by RN and supervised by physician    : Mathieu Hickey M.D.    ANESTHESIA: Moderate sedation,  start time 7:25 am, stop time 8:03 am  the moderate sedation document has been reviewed, signed and scanned into media     ESTIMATED BLOOD LOSS: 20 cc.    SPECIMENS: None.    COMPLICATIONS: None    INDICATION: CHB GRETTA    PRE-PROCEDURE ECG: Dual chamber pacing    POST-PROCEDURE ECG: Dual chamber pacing    DESCRIPTION OF PROCEDURE: After informed written consent, the patient was brought to the electrophysiology lab in the fasting, unsedated state. The patient was prepped and draped in the usual sterile fashion. The procedure was performed under moderate sedation with local anesthetic. An incision was made with a scalpel along the old scar. Access to the device pocket was made using a combination of blunt dissection and electrocautery. The old generator and leads were freed from adhesions and the generator disconnected from the leads. Lead tested fine.  The pocket was irrigated with antibiotic solution, and the new generator was connected to the leads and inserted back in the pocket. The wound was closed with three layers of absorbable sutures and covered with Steri-Strips.   I personally supervised the administration of moderate sedation by the RN and observed the level of consciousness and physiologic status throughout the procedure.  Following recovery from sedation, the patient was transferred to a monitored bed in good condition.    IMPLANTED DEVICE INFORMATION:    Pulse generator is a [employer] model LO5969  Serial number [id number]      LEAD INFORMATION:  1. Right atrial lead is a [employer] model 1688TC/46, serial number  [id number], P wave 2.2 millivolts, threshold 0.75 Volts, pacing impedance 490 Ohms, implant date 4/28/2011  2. Right ventricular lead is a [employer] model 1688TC/52,  serial number [id number], R wave paced millivolts, threshold 0.75 Volts, pacing impedance 360 Ohms, implant date 4/28/2011      DEVICE PROGRAMMING:    As previous programmed     IMPRESSIONS:  1. Successful PPM generator change.    RECOMMENDATIONS:  1. Transfer to PPU.  2. Follow-up in device clinic for wound check and device interrogation.

## 2018-07-25 ENCOUNTER — ANTICOAGULATION MONITORING (OUTPATIENT)
Dept: VASCULAR LAB | Facility: MEDICAL CENTER | Age: 28
End: 2018-07-25

## 2018-07-25 ENCOUNTER — HOSPITAL ENCOUNTER (OUTPATIENT)
Dept: LAB | Facility: MEDICAL CENTER | Age: 28
End: 2018-07-25
Attending: NURSE PRACTITIONER
Payer: COMMERCIAL

## 2018-07-25 ENCOUNTER — TELEPHONE (OUTPATIENT)
Dept: CARDIOLOGY | Facility: MEDICAL CENTER | Age: 28
End: 2018-07-25

## 2018-07-25 DIAGNOSIS — I48.0 PAROXYSMAL ATRIAL FIBRILLATION (HCC): ICD-10-CM

## 2018-07-25 DIAGNOSIS — I51.3 THROMBUS OF LEFT ATRIAL APPENDAGE WITHOUT ANTECEDENT MYOCARDIAL INFARCTION: ICD-10-CM

## 2018-07-25 LAB
INR PPP: 3.73 (ref 0.87–1.13)
PROTHROMBIN TIME: 36.7 SEC (ref 12–14.6)

## 2018-07-25 PROCEDURE — 36415 COLL VENOUS BLD VENIPUNCTURE: CPT

## 2018-07-25 PROCEDURE — 85610 PROTHROMBIN TIME: CPT

## 2018-07-25 NOTE — TELEPHONE ENCOUNTER
Lm cannot shower until first appt, can take bath up to stomach area and not get incision wet. Above stomach needs to be washed only without getting incision wet.     To TKpt first appt not for 3 weeks, is there any other advice for this pt with fu at 3 weeks?

## 2018-07-25 NOTE — TELEPHONE ENCOUNTER
----- Message from Deborah Simon sent at 7/25/2018 12:44 PM PDT -----  Regarding: Question about showering after having Pacemaker Generator Change  JANAE/Rose    Patient had a Pacemaker Generator Change on 7/18 with Dr Hickey and wants to find out if it's okay to take a shower. She can be reached at [phone].

## 2018-07-26 NOTE — PROGRESS NOTES
Anticoagulation Summary  As of 7/25/2018    INR goal:   2.5-3.5   TTR:   63.7 % (3.1 y)   Today's INR:   3.73!   Warfarin maintenance plan:   5 mg (5 mg x 1) on Wed; 10 mg (5 mg x 2) all other days   Weekly warfarin total:   65 mg   Plan last modified:   Tan Helton, PharmD (5/22/2018)   Next INR check:   8/1/2018   Target end date:   Indefinite    Indications    Paroxysmal atrial fibrillation (HCC) [I48.0]  Mitral stenosis s/p Mechanical MVR [I05.0]             Anticoagulation Episode Summary     INR check location:   Outside Lab    Preferred lab:       Send INR reminders to:       Comments:   [facility]       Anticoagulation Care Providers     Provider Role Specialty Phone number    Bird Rodriguez D.O. Referring Family Medicine [phone]    Trey Dubon M.D. Referring Cardiac Surgery [phone]    Taj Allen, PharmD Responsible          Anticoagulation Patient Findings      Spoke with patient.  INR is supra therapeutic.   Pt was on doxycycline for 4 days post procedure last week.  Pt denies any unusual s/s of bleeding, bruising, clotting or any changes to diet or medications. Denies any etoh, cranberries, supplements, or illness.   Pt verifies warfarin weekly dosing.     Will have pt take a decreased dose of 2.5mg x1 today and then resume her normal warfarin dosing.     Repeat INR in 1 week(s).     Ann Medina, PharmD

## 2018-08-07 ENCOUNTER — NON-PROVIDER VISIT (OUTPATIENT)
Dept: CARDIOLOGY | Facility: MEDICAL CENTER | Age: 28
End: 2018-08-07
Payer: COMMERCIAL

## 2018-08-07 DIAGNOSIS — Z95.0 PACEMAKER: ICD-10-CM

## 2018-08-07 PROCEDURE — 93280 PM DEVICE PROGR EVAL DUAL: CPT | Performed by: INTERNAL MEDICINE

## 2018-08-07 NOTE — NON-PROVIDER
S/p pacemaker generator replacement, implanted on 7-. Appropriate device function demonstrated. Wound site appears clear. Advised to watch for redness, swelling, oozing or fever. Pt verbalized understanding. See back in 5 weeks for final testing.

## 2018-08-16 ENCOUNTER — TELEPHONE (OUTPATIENT)
Dept: VASCULAR LAB | Facility: MEDICAL CENTER | Age: 28
End: 2018-08-16

## 2018-08-16 NOTE — TELEPHONE ENCOUNTER
[facility]    Left a voicemail to remind pt to obtain next INR.    Taj Allen, PharmD

## 2018-08-21 ENCOUNTER — HOSPITAL ENCOUNTER (OUTPATIENT)
Dept: LAB | Facility: MEDICAL CENTER | Age: 28
End: 2018-08-21
Attending: NURSE PRACTITIONER
Payer: COMMERCIAL

## 2018-08-21 DIAGNOSIS — I51.3 THROMBUS OF LEFT ATRIAL APPENDAGE WITHOUT ANTECEDENT MYOCARDIAL INFARCTION: ICD-10-CM

## 2018-08-21 LAB
INR PPP: 5.48 (ref 0.87–1.13)
PROTHROMBIN TIME: 49.8 SEC (ref 12–14.6)

## 2018-08-21 PROCEDURE — 85610 PROTHROMBIN TIME: CPT

## 2018-08-21 PROCEDURE — 36415 COLL VENOUS BLD VENIPUNCTURE: CPT

## 2018-08-22 ENCOUNTER — ANTICOAGULATION MONITORING (OUTPATIENT)
Dept: VASCULAR LAB | Facility: MEDICAL CENTER | Age: 28
End: 2018-08-22

## 2018-08-22 DIAGNOSIS — I34.2 NON-RHEUMATIC MITRAL VALVE STENOSIS: ICD-10-CM

## 2018-08-22 DIAGNOSIS — I48.0 PAROXYSMAL ATRIAL FIBRILLATION (HCC): ICD-10-CM

## 2018-08-22 NOTE — PROGRESS NOTES
Anticoagulation Summary  As of 8/22/2018    INR goal:   2.5-3.5   TTR:   62.2 % (3.2 y)   Today's INR:   5.48! (8/21/2018)   Warfarin maintenance plan:   5 mg (5 mg x 1) on Wed, Sat; 10 mg (5 mg x 2) all other days   Weekly warfarin total:   60 mg   Plan last modified:   Taj Allen PharmD (8/22/2018)   Next INR check:   8/27/2018   Target end date:   Indefinite    Indications    Paroxysmal atrial fibrillation (HCC) [I48.0]  Mitral stenosis s/p Mechanical MVR [I05.0]             Anticoagulation Episode Summary     INR check location:   Outside Lab    Preferred lab:       Send INR reminders to:       Comments:   [facility]       Anticoagulation Care Providers     Provider Role Specialty Phone number    Bird oRdriguez D.O. Referring Family Medicine [phone]    Trey Dubon M.D. Referring Cardiac Surgery [phone]    Taj Allen, [place] Responsible          Anticoagulation Patient Findings    Left voicemail message to report a SUPRA therapeutic INR of 5.5.  Pt to hold warfarin for 2 days then begin reduced warfarin dosing regimen. Requested pt contact the clinic for any s/s of unusual bleeding, bruising, clotting or any changes to diet or medication. FU 5   days.     Taj Allen PharmD

## 2018-08-31 ENCOUNTER — ANTICOAGULATION MONITORING (OUTPATIENT)
Dept: VASCULAR LAB | Facility: MEDICAL CENTER | Age: 28
End: 2018-08-31

## 2018-08-31 ENCOUNTER — HOSPITAL ENCOUNTER (OUTPATIENT)
Dept: LAB | Facility: MEDICAL CENTER | Age: 28
End: 2018-08-31
Attending: NURSE PRACTITIONER
Payer: COMMERCIAL

## 2018-08-31 DIAGNOSIS — I48.0 PAROXYSMAL ATRIAL FIBRILLATION (HCC): ICD-10-CM

## 2018-08-31 LAB
INR PPP: 4.28 (ref 0.87–1.13)
PROTHROMBIN TIME: 40.9 SEC (ref 12–14.6)

## 2018-08-31 PROCEDURE — 36415 COLL VENOUS BLD VENIPUNCTURE: CPT

## 2018-08-31 PROCEDURE — 85610 PROTHROMBIN TIME: CPT

## 2018-09-10 ENCOUNTER — HOSPITAL ENCOUNTER (EMERGENCY)
Facility: MEDICAL CENTER | Age: 28
End: 2018-09-11
Attending: EMERGENCY MEDICINE
Payer: COMMERCIAL

## 2018-09-10 ENCOUNTER — TELEPHONE (OUTPATIENT)
Dept: VASCULAR LAB | Facility: MEDICAL CENTER | Age: 28
End: 2018-09-10

## 2018-09-10 ENCOUNTER — ANTICOAGULATION MONITORING (OUTPATIENT)
Dept: VASCULAR LAB | Facility: MEDICAL CENTER | Age: 28
End: 2018-09-10

## 2018-09-10 ENCOUNTER — TELEPHONE (OUTPATIENT)
Dept: CARDIOLOGY | Facility: MEDICAL CENTER | Age: 28
End: 2018-09-10

## 2018-09-10 ENCOUNTER — HOSPITAL ENCOUNTER (OUTPATIENT)
Dept: LAB | Facility: MEDICAL CENTER | Age: 28
End: 2018-09-10
Attending: NURSE PRACTITIONER
Payer: COMMERCIAL

## 2018-09-10 DIAGNOSIS — I48.0 PAROXYSMAL ATRIAL FIBRILLATION (HCC): ICD-10-CM

## 2018-09-10 DIAGNOSIS — R79.1 SUPRATHERAPEUTIC INR: ICD-10-CM

## 2018-09-10 LAB
ABO GROUP BLD: NORMAL
ALBUMIN SERPL BCP-MCNC: 4.4 G/DL (ref 3.2–4.9)
ALBUMIN/GLOB SERPL: 1.2 G/DL
ALP SERPL-CCNC: 56 U/L (ref 30–99)
ALT SERPL-CCNC: 24 U/L (ref 2–50)
ANION GAP SERPL CALC-SCNC: 10 MMOL/L (ref 0–11.9)
APTT PPP: 61.9 SEC (ref 24.7–36)
AST SERPL-CCNC: 33 U/L (ref 12–45)
BASOPHILS # BLD AUTO: 0.2 % (ref 0–1.8)
BASOPHILS # BLD: 0.01 K/UL (ref 0–0.12)
BILIRUB SERPL-MCNC: 0.5 MG/DL (ref 0.1–1.5)
BLD GP AB SCN SERPL QL: NORMAL
BUN SERPL-MCNC: 12 MG/DL (ref 8–22)
CALCIUM SERPL-MCNC: 9.8 MG/DL (ref 8.5–10.5)
CHLORIDE SERPL-SCNC: 103 MMOL/L (ref 96–112)
CO2 SERPL-SCNC: 26 MMOL/L (ref 20–33)
CREAT SERPL-MCNC: 0.67 MG/DL (ref 0.5–1.4)
EOSINOPHIL # BLD AUTO: 0.14 K/UL (ref 0–0.51)
EOSINOPHIL NFR BLD: 2.4 % (ref 0–6.9)
ERYTHROCYTE [DISTWIDTH] IN BLOOD BY AUTOMATED COUNT: 44.1 FL (ref 35.9–50)
GLOBULIN SER CALC-MCNC: 3.8 G/DL (ref 1.9–3.5)
GLUCOSE SERPL-MCNC: 72 MG/DL (ref 65–99)
HCT VFR BLD AUTO: 42.7 % (ref 37–47)
HGB BLD-MCNC: 13.7 G/DL (ref 12–16)
IMM GRANULOCYTES # BLD AUTO: 0.02 K/UL (ref 0–0.11)
IMM GRANULOCYTES NFR BLD AUTO: 0.3 % (ref 0–0.9)
INR PPP: 5.86 (ref 0.87–1.13)
INR PPP: 8.26 (ref 0.87–1.13)
LYMPHOCYTES # BLD AUTO: 1.15 K/UL (ref 1–4.8)
LYMPHOCYTES NFR BLD: 19.7 % (ref 22–41)
MCH RBC QN AUTO: 29 PG (ref 27–33)
MCHC RBC AUTO-ENTMCNC: 32.1 G/DL (ref 33.6–35)
MCV RBC AUTO: 90.3 FL (ref 81.4–97.8)
MONOCYTES # BLD AUTO: 0.5 K/UL (ref 0–0.85)
MONOCYTES NFR BLD AUTO: 8.5 % (ref 0–13.4)
NEUTROPHILS # BLD AUTO: 4.03 K/UL (ref 2–7.15)
NEUTROPHILS NFR BLD: 68.9 % (ref 44–72)
NRBC # BLD AUTO: 0 K/UL
NRBC BLD-RTO: 0 /100 WBC
PLATELET # BLD AUTO: 273 K/UL (ref 164–446)
PMV BLD AUTO: 11.1 FL (ref 9–12.9)
POTASSIUM SERPL-SCNC: 4.2 MMOL/L (ref 3.6–5.5)
PROT SERPL-MCNC: 8.2 G/DL (ref 6–8.2)
PROTHROMBIN TIME: 52.5 SEC (ref 12–14.6)
PROTHROMBIN TIME: 68.9 SEC (ref 12–14.6)
RBC # BLD AUTO: 4.73 M/UL (ref 4.2–5.4)
RH BLD: NORMAL
SODIUM SERPL-SCNC: 139 MMOL/L (ref 135–145)
WBC # BLD AUTO: 5.9 K/UL (ref 4.8–10.8)

## 2018-09-10 PROCEDURE — 85610 PROTHROMBIN TIME: CPT | Mod: 91

## 2018-09-10 PROCEDURE — 86850 RBC ANTIBODY SCREEN: CPT

## 2018-09-10 PROCEDURE — 85610 PROTHROMBIN TIME: CPT

## 2018-09-10 PROCEDURE — 86900 BLOOD TYPING SEROLOGIC ABO: CPT

## 2018-09-10 PROCEDURE — 36415 COLL VENOUS BLD VENIPUNCTURE: CPT

## 2018-09-10 PROCEDURE — 80053 COMPREHEN METABOLIC PANEL: CPT

## 2018-09-10 PROCEDURE — 99284 EMERGENCY DEPT VISIT MOD MDM: CPT

## 2018-09-10 PROCEDURE — 85730 THROMBOPLASTIN TIME PARTIAL: CPT

## 2018-09-10 PROCEDURE — 86901 BLOOD TYPING SEROLOGIC RH(D): CPT

## 2018-09-10 PROCEDURE — 85025 COMPLETE CBC W/AUTO DIFF WBC: CPT

## 2018-09-10 PROCEDURE — 84703 CHORIONIC GONADOTROPIN ASSAY: CPT

## 2018-09-10 ASSESSMENT — ENCOUNTER SYMPTOMS
SHORTNESS OF BREATH: 1
DIZZINESS: 1

## 2018-09-10 ASSESSMENT — PAIN SCALES - GENERAL: PAINLEVEL_OUTOF10: 0

## 2018-09-11 ENCOUNTER — ANTICOAGULATION MONITORING (OUTPATIENT)
Dept: VASCULAR LAB | Facility: MEDICAL CENTER | Age: 28
End: 2018-09-11

## 2018-09-11 ENCOUNTER — NON-PROVIDER VISIT (OUTPATIENT)
Dept: CARDIOLOGY | Facility: MEDICAL CENTER | Age: 28
End: 2018-09-11
Payer: COMMERCIAL

## 2018-09-11 VITALS
DIASTOLIC BLOOD PRESSURE: 78 MMHG | BODY MASS INDEX: 28.16 KG/M2 | WEIGHT: 153 LBS | SYSTOLIC BLOOD PRESSURE: 120 MMHG | RESPIRATION RATE: 16 BRPM | TEMPERATURE: 97.3 F | HEIGHT: 62 IN | HEART RATE: 66 BPM | OXYGEN SATURATION: 98 %

## 2018-09-11 DIAGNOSIS — I48.0 PAROXYSMAL ATRIAL FIBRILLATION (HCC): ICD-10-CM

## 2018-09-11 DIAGNOSIS — Z95.0 PACEMAKER: ICD-10-CM

## 2018-09-11 LAB
BLD GP AB INVEST PLASRBC-IMP: NORMAL
BLD GP AB INVEST PLASRBC-IMP: NORMAL
HCG SERPL QL: NEGATIVE

## 2018-09-11 PROCEDURE — 99284 EMERGENCY DEPT VISIT MOD MDM: CPT

## 2018-09-11 PROCEDURE — 86870 RBC ANTIBODY IDENTIFICATION: CPT

## 2018-09-11 PROCEDURE — 86905 BLOOD TYPING RBC ANTIGENS: CPT

## 2018-09-11 PROCEDURE — 93280 PM DEVICE PROGR EVAL DUAL: CPT | Performed by: INTERNAL MEDICINE

## 2018-09-11 ASSESSMENT — PAIN SCALES - GENERAL: PAINLEVEL_OUTOF10: 0

## 2018-09-11 ASSESSMENT — ENCOUNTER SYMPTOMS
NAUSEA: 0
CHILLS: 0
ABDOMINAL PAIN: 0
BLOOD IN STOOL: 0
BRUISES/BLEEDS EASILY: 1
FLANK PAIN: 0
VOMITING: 0
FEVER: 0
BACK PAIN: 0
SORE THROAT: 0

## 2018-09-11 NOTE — TELEPHONE ENCOUNTER
Called by lab for critically high INR. Attempted to call patient multiple times.  Left a message telling her to hold her coumadin and tp call 911 if there is any bleeding.    Will forward to coumadin clinic to call her tomorrow with further instructions.

## 2018-09-11 NOTE — TELEPHONE ENCOUNTER
[facility]    Left  with pt and emergency contact regarding supra therapeutic INR of 8.26.  Requested pt to hold warfarin starting today (9/10/18) and continue holding until her next INR can be obtained on 9/13/18.  Requested pt to obtain medical help immediately for any bleeding.   Will try following up with the pt again tomorrow.    Taj Allen, PharmD

## 2018-09-11 NOTE — ED PROVIDER NOTES
ED Provider Note    Scribed for CASSANDRA Anthony II* by Ray Patel. 9/10/2018  9:54 PM    Means of Arrival: Walk in  History obtained by: Patient  Limitations: None    CHIEF COMPLAINT  Chief Complaint   Patient presents with   • Abnormal Labs       HPI  Sameer Boston is a 28 y.o. female with a history of heart valve replacement taking warfarin who presents with an INR of 8.26 from labs done earlier today. Sameer confirms associated lightheadedness and shortness of breath upon standing up. She denies any bloody stool or urine. Sameer is currently on her period, but she is unsure if it is acutally her period. She has gone through 2 pads today, but has slowed down. She was going through 4-5 pads 1 day ago. Her MD advised to go the ED for evaluation if she does not feel normal. She says she wasn't feeling bad but became very nervous hearing the level was so high. Sameer is follow at the coumadin clinic. She reports that she has been on warfarin for 7 years.    REVIEW OF SYSTEMS  Review of Systems   Constitutional: Negative for chills and fever.   HENT: Negative for sore throat.    Respiratory: Positive for shortness of breath.    Cardiovascular: Negative for chest pain.   Gastrointestinal: Negative for abdominal pain, blood in stool, nausea and vomiting.   Genitourinary: Negative for flank pain and hematuria.   Musculoskeletal: Negative for back pain.   Neurological: Positive for dizziness (Lightheadedness).   Endo/Heme/Allergies: Bruises/bleeds easily.     See HPI for further details.    PAST MEDICAL HISTORY   has a past medical history of Anemia (4/17/2011); CHF (congestive heart failure) (HCC); Chronic anticoagulation; CVA (cerebral infarction) (2011); Elbow fracture; History of atrial fibrillation; Hyperlipidemia; Left atrial thrombus; Migraine; Mitral stenosis; Pacemaker; Pulmonary hypertension (HCC); Third degree heart block (HCC); and Tricuspid regurgitation.    SOCIAL HISTORY  Social  "History     Social History Main Topics   • Smoking status: Never Smoker   • Smokeless tobacco: Never Used   • Alcohol use No   • Drug use: No   • Sexual activity: Not Currently     Partners: Male       SURGICAL HISTORY   has a past surgical history that includes primary c section (4/6/2011); mitral valve replace (4/25/2011); tricuspid valve repair (4/25/2011); mass excision general (4/25/2011); pelviscopy (9/6/2011); intra uterine device removal (9/6/2011); tubal coagulation laparoscopic bilateral (9/6/2011); elbow orif (Right, 1/26/2016); and pacemaker insertion (2011).    CURRENT MEDICATIONS  No current facility-administered medications for this encounter.     Current Outpatient Prescriptions:   •  doxycycline (VIBRAMYCIN) 100 MG Cap, Take 1 Cap by mouth 2 times a day., Disp: 8 Cap, Rfl: 0  •  acetaminophen (TYLENOL) 325 MG Tab, Take 325-650 mg by mouth as needed (headache)., Disp: , Rfl:   •  warfarin (COUMADIN) 5 MG Tab, Take two tablets daily as directed by [facility], Disp: 180 Tab, Rfl: 1  •  metoprolol SR (TOPROL XL) 25 MG TABLET SR 24 HR, Take 1 Tab by mouth every bedtime., Disp: 90 Tab, Rfl: 1  •  atorvastatin (LIPITOR) 40 MG Tab, Take 1 Tab by mouth every evening., Disp: 90 Tab, Rfl: 1  •  sumatriptan (IMITREX) 20 MG/ACT nasal spray, Spray 1 Spray in nose as needed for Migraine., Disp: 1 Each, Rfl: 0  •  therapeutic multivitamin-minerals (THERAGRAN-M) Tab, Take 1 Tab by mouth every day., Disp: 30 Tab, Rfl: 0  •  Cholecalciferol (VITAMIN D) 2000 UNITS Cap, Take 1 Cap by mouth every day., Disp: 30 Cap, Rfl: 0      ALLERGIES  Allergies   Allergen Reactions   • No Known Drug Allergy        PHYSICAL EXAM  VITAL SIGNS: /84   Pulse 90   Temp 36.3 °C (97.3 °F)   Resp 17   Ht 1.575 m (5' 2\")   Wt 69.4 kg (153 lb)   SpO2 98%   BMI 27.98 kg/m²     Pulse ox interpretation: I interpret this pulse ox as normal.  Constitutional: Well appearing 28 year old woman, no distress  HENT: No " signs of trauma, Bilateral external ears normal, Nose normal.   Eyes: Pupils are equal, Conjunctiva normal, Non-icteric.   Neck: Normal range of motion, No tenderness, Supple, No stridor.   Cardiovascular: Heart rate is in the 90s, no murmur  Thorax & Lungs: Normal breath sounds, No respiratory distress, No wheezing  Abdomen: Bowel sounds normal, Soft, No tenderness, No masses, No pulsatile masses. No peritoneal signs.  Skin: No rash, No signs of pallor  Musculoskeletal: Good range of motion in all major joints. No tenderness to palpation or major deformities noted.   Neurologic: Alert , Normal motor function, Normal sensory function, No focal deficits noted.   Psychiatric: Affect normal, Judgment normal, Mood normal.     DIAGNOSTIC STUDIES / PROCEDURES    LABS  Pertinent Labs & Imaging studies reviewed. (See chart for details)    COURSE & MEDICAL DECISION MAKING  Pertinent Labs & Imaging studies reviewed. (See chart for details)    9:54 PM This is a 28 y.o. female who presents with INR of 8.26 and the differential diagnosis includes but is not limited to Anemia, super-therapeutic INR, menstrual cycle, pregnancy. Ordered for HCG Qual Serum, PTT, CBC, CMP, COD, and Prothrombin to evaluate.    12:27 AM  INR down to 5 now.  She reports having no vaginal bleeding at this time.  Hemoglobin is 13.7.  Vitals are within acceptable limits.  I asked her to call anticoagulation clinic tomorrow to have her levels repeated.  Her therapeutic level is between 2.5 and 3 for mechanical valve.  I think she should follow-up soon because it would be problematic for her to drop below therapeutic level setting of mechanical valve.  I also asked her to return if she developed significant bleeding symptoms.       The patient will return for worsening symptoms and is stable at the time of discharge. The patient verbalizes understanding and will comply.      FINAL IMPRESSION  1. Supratherapeutic INR            IRay (Scribe),  am scribing for, and in the presence of, FILOMENA Anthony II.    Electronically signed by: Ray Patel (Scribe), 9/10/2018    I, FILOMENA Anthony II personally performed the services described in this documentation, as scribed by Ray Patel in my presence, and it is both accurate and complete. E    The note accurately reflects work and decisions made by me.  Amadou Phelps II  9/11/2018  12:28 AM

## 2018-09-11 NOTE — ED TRIAGE NOTES
Pt to triage with INR at 8.26. Pt c/o vaginal bleeding. Denies pain. Pt unsure if she is supposed to be on her cycle.   Pt advised to return to triage nurse for any changes or concerns.

## 2018-09-11 NOTE — PROGRESS NOTES
OP Telephone Anticoagulation Service Note    Date: 9/11/2018      Anticoagulation Summary  As of 9/10/2018    INR goal:   2.5-3.5   TTR:   61.1 % (3.2 y)   Today's INR:   8.26!   Warfarin maintenance plan:   5 mg (5 mg x 1) on Mon, Wed, Fri; 10 mg (5 mg x 2) all other days   Weekly warfarin total:   55 mg   Plan last modified:   Taj Allen PharmD (8/31/2018)   Next INR check:   9/12/2018   Target end date:   Indefinite    Indications    Paroxysmal atrial fibrillation (HCC) [I48.0]  Mitral stenosis s/p Mechanical MVR [I05.0]             Anticoagulation Episode Summary     INR check location:   Outside Lab    Preferred lab:       Send INR reminders to:       Comments:   [facility]       Anticoagulation Care Providers     Provider Role Specialty Phone number    Bird Rodriguez D.O. Referring Family Medicine [phone]    Trey Dubon M.D. Referring Cardiac Surgery [phone]    Taj Allen PharmD Responsible          Anticoagulation Patient Findings        Plan: INR is high today. Spoke with pt on the phone. Confirmed she got our message last night and confirmed dosing regimen, she states she may have taken 10 mg one day that she was suppose to take 5 mg. Patient denies sign/symptoms of bleeding/clotting, although she went to ER yesterday for menstrual bleeding. No recent medication changes and patient has been eating a consistent diet. Instructed pt to call clinic with any concerns of bleeding or thrombosis. PT to HOLD warfarin for 1 more day and then get INR checked. Advised pt no to take warfarin until she speaks with us.  Follow up in 2 day(s)        Estrella Gonzales PharmD

## 2018-09-11 NOTE — NON-PROVIDER
Final threshold testing today. Appropriate device function demonstrated. Wound site appears clear. Advised to watch for redness,swelling, oozing or fever. Pt verbalized understanding. See back in 6 months.

## 2018-09-11 NOTE — DISCHARGE INSTRUCTIONS
Anticoagulation, Generic  Anticoagulants are medications used to prevent clots from developing in your veins. These medications are also known as blood thinners. If blood clots are untreated, they could travel to your lungs. This is called a pulmonary embolus. A blood clot in your lungs can be fatal.   Caregivers often use anticoagulants to prevent clots following surgery. Anticoagulants are also used along with aspirin when the heart is not getting enough blood.  Another anticoagulant called warfarin is started 2 to 3 days after a rapid-acting injectable anticoagulant is started. The rapid-acting anticoagulants are usually continued until warfarin has begun to work. Your caregiver will  this length of time by blood tests known as the prothrombin time (PT) and International Normalization Ratio (INR). This means that your blood is at the necessary and best level to prevent clots.  RISKS AND COMPLICATIONS  · If you have received recent epidural anesthesia, spinal anesthesia, or a spinal tap while receiving anticoagulants, you are at risk for developing a blood clot in or around the spine. This condition could result in long-term or permanent paralysis.  · Because anticoagulants thin your blood, severe bleeding may occur from any tissue or organ. Symptoms of the blood being too thin may include:  · Bleeding from the nose or gums that does not stop quickly.  · Unusual bruising or bruising easily.  · Swelling or pain at an injection site.  · A cut that does not stop bleeding within 10 minutes.  · Continual nausea for more than 1 day or vomiting blood.  · Coughing up blood.  · Blood in the urine which may appear as pink, red, or brown urine.  · Blood in bowel movements which may appear as red, dark or black stools.  · Sudden weakness or numbness of the face, arm, or leg, especially on one side of the body.  · Sudden confusion.  · Trouble speaking (aphasia) or understanding.  · Sudden trouble seeing in one or both  eyes.  · Sudden trouble walking.  · Dizziness.  · Loss of balance or coordination.  · Severe pain, such as a headache, joint pain, or back pain.  · Fever.  · Too little anticoagulation continues to allow the risk for blood clots.  HOME CARE INSTRUCTIONS   · Due to the complications of anticoagulants, it is very important that you take your anticoagulant as directed by your caregiver. Anticoagulants need to be taken exactly as instructed. Be sure you understand all your anticoagulant instructions.  · Warfarin. Your caregiver will advise you on the length of treatment (usually 3 6 months, sometimes lifelong).  · Take warfarin exactly as directed by your caregiver. It is recommended that you take your warfarin dose at the same time of the day. It is preferred that you take warfarin in the late afternoon. If you have been told to stop taking warfarin, do not resume taking warfarin until directed to do so by your caregiver. Follow your caregiver's instructions if you accidentally take an extra dose or miss a dose of warfarin. It is very important to take warfarin as directed since bleeding or blood clots could result in chronic or permanent injury, pain, or disability.  · Too much and too little warfarin are both dangerous. Too much warfarin increases the risk of bleeding. Too little warfarin continues to allow the risk for blood clots. While taking warfarin, you will need to have regular blood tests to measure your blood clotting time. These blood tests usually include both the PT and INR tests. The PT and INR results allow your caregiver to adjust your dose of warfarin. The dose can change for many reasons. It is critically important that you take warfarin exactly as prescribed, and that you have your PT and INR levels drawn exactly as directed. Follow up with your laboratory test appointments as directed. It is very important to keep your lab appointments. Not keeping lab appointments could result in a chronic or  permanent injury, pain, or disability.  · Many foods, especially foods high in vitamin K can interfere with warfarin and affect the PT and INR results. Foods high in vitamin K include spinach, kale, broccoli, cabbage, samantha and turnip greens, brussels sprouts, peas, cauliflower, seaweed, and parsley as well as beef and pork liver, green tea, and soybean oil. You should eat a consistent amount of foods high in vitamin K. Avoid major changes in your diet, or notify your caregiver before changing your diet. Arrange a visit with a dietitian to answer your questions.  · Many medicines can interfere with warfarin and affect the PT and INR results. You must tell your caregiver about any and all medicines you take, this includes all vitamins and supplements. Ask your caregiver before taking these. Prescription and over-the-counter medicine consistency is critical to warfarin management. It is important that potential interactions are checked before you start a new medicine. Be especially cautious with aspirin and anti-inflammatory medicines. Ask your caregiver before taking these. Medicines such as antibiotics and acid-reducing medicine can interact with warfarin and can cause an increased warfarin effect. Warfarin can also interfere with the effectiveness of medicines you are taking. Do not take or discontinue any prescribed or over-the-counter medicine except on the advice of your caregiver or pharmacist.  · Some vitamins, supplements, and herbal products interfere with the effectiveness of warfarin. Vitamin E may increase the anticoagulant effects of warfarin. Vitamin K may can cause warfarin to be less effective. Do not take or discontinue any vitamin, supplement, or herbal product except on the advice of your caregiver or pharmacist.  · Alcohol can change the body's ability to handle warfarin. It is best to avoid alcoholic drinks or consume only very small amounts while taking warfarin. Notify your caregiver if you  change your alcohol intake. A sudden increase in alcohol use can increase your risk of bleeding. Chronic alcohol use can cause warfarin to be less effective.  · If you have a loss of appetite or get the stomach flu (viral gastroenteritis), talk to your caregiver as soon as possible. A decrease in your normal vitamin K intake can make you more sensitive to your usual dose of warfarin.  · Some medical conditions may increase your risk for bleeding while you are taking warfarin. A fever, diarrhea lasting more than a day, worsening heart failure, or worsening liver function are some medical conditions that could affect warfarin. Contact your caregiver if you have any of these medical conditions.  · Warfarin can have side effects, such as excessive bruising or bleeding. You will need to hold pressure over cuts for longer than usual.  · Be careful not to cut yourself when using sharp objects.  · Notify your dentist or other caregivers before procedures.  · Limit physical activities or sports that could result in a fall or cause injury. Avoid contact sports.  · Wear a medical alert bracelet or carry a medical alert card.  SEEK MEDICAL CARE IF:   · You develop any rashes.  · You have any worsening of the condition for which you are receiving anticoagulation therapy.  SEEK IMMEDIATE MEDICAL CARE IF:   · Bleeding from the nose or gums does not stop quickly.  · You have unusual bruising or are bruising easily.  · Swelling or pain occurs at an injection site.  · A cut does not stop bleeding within 10 minutes.  · You have continual nausea for more than 1 day or are vomiting blood.  · You are coughing up blood.  · You have blood in the urine.  · You have dark or black stools.  · You have sudden weakness or numbness of the face, arm, or leg, especially on one side of the body.  · You have sudden confusion.  · You have trouble speaking (aphasia) or understanding.  · You have sudden trouble seeing in one or both eyes.  · You have  sudden trouble walking.  · You have dizziness.  · You have a loss of balance or coordination.  · You have severe pain, such as a headache, joint pain, or back pain.  · You have a serious fall or head injury, even if you are not bleeding.  · You have an oral temperature above 102° F (38.9° C), not controlled by medicine.  ANY OF THESE SYMPTOMS MAY REPRESENT A SERIOUS PROBLEM THAT IS AN EMERGENCY. Do not wait to see if the symptoms will go away. Get medical help right away. Call your local emergency services (911 in [place] DO NOT drive yourself to the hospital.  MAKE SURE YOU:   · Understand these instructions.  · Will watch your condition.  · Will get help right away if you are not doing well or get worse.  Document Released: 12/18/2006 Document Revised: 09/11/2013 Document Reviewed: 07/22/2009  [employer]® Patient Information ©2014 [employer].

## 2018-09-12 ENCOUNTER — ANTICOAGULATION VISIT (OUTPATIENT)
Dept: VASCULAR LAB | Facility: MEDICAL CENTER | Age: 28
End: 2018-09-12
Attending: INTERNAL MEDICINE
Payer: COMMERCIAL

## 2018-09-12 VITALS — SYSTOLIC BLOOD PRESSURE: 105 MMHG | HEART RATE: 87 BPM | DIASTOLIC BLOOD PRESSURE: 62 MMHG

## 2018-09-12 DIAGNOSIS — I48.0 PAROXYSMAL ATRIAL FIBRILLATION (HCC): ICD-10-CM

## 2018-09-12 DIAGNOSIS — Z79.01 CHRONIC ANTICOAGULATION: ICD-10-CM

## 2018-09-12 LAB — INR PPP: 2.7 (ref 2–3.5)

## 2018-09-12 PROCEDURE — 85610 PROTHROMBIN TIME: CPT

## 2018-09-12 PROCEDURE — 99211 OFF/OP EST MAY X REQ PHY/QHP: CPT

## 2018-09-12 NOTE — PROGRESS NOTES
Anticoagulation Summary  As of 9/12/2018    INR goal:   2.5-3.5   TTR:   61.1 % (3.2 y)   Today's INR:   2.7   Warfarin maintenance plan:   10 mg (5 mg x 2) on Mon, Wed, Fri; 5 mg (5 mg x 1) all other days   Weekly warfarin total:   50 mg   Plan last modified:   Taj Allen PharmD (9/12/2018)   Next INR check:   9/17/2018   Target end date:   Indefinite    Indications    Paroxysmal atrial fibrillation (HCC) [I48.0]  Mitral stenosis s/p Mechanical MVR [I05.0]             Anticoagulation Episode Summary     INR check location:   Outside Lab    Preferred lab:       Send INR reminders to:       Comments:   [facility]       Anticoagulation Care Providers     Provider Role Specialty Phone number    Bird Rodriguez D.O. Referring Family Medicine [phone]    Trey Dubon M.D. Referring Cardiac Surgery [phone]    Taj Allen, PharmD Responsible          Anticoagulation Patient Findings      HPI:  Sameer Demi Ludy seen in clinic today, on anticoagulation therapy with warfarin for Afib, MVR  Changes to current medical/health status since last appt: Patient went to the ER for concerning menstrual bleeding and high INR   Denies any interval changes to diet  Denies any interval changes to medications since last appt.   Denies any complications or cost restrictions with current therapy.   BP recorded in vitals.  Unknown reason for high INR.     A/P   INR therapeutic.     Decrease weekly regimen as noted above.     Follow up appointment in 5 day(s).    Taj Allen, AgataD

## 2018-09-17 ENCOUNTER — HOSPITAL ENCOUNTER (OUTPATIENT)
Dept: LAB | Facility: MEDICAL CENTER | Age: 28
End: 2018-09-17
Attending: NURSE PRACTITIONER
Payer: COMMERCIAL

## 2018-09-17 ENCOUNTER — ANTICOAGULATION MONITORING (OUTPATIENT)
Dept: VASCULAR LAB | Facility: MEDICAL CENTER | Age: 28
End: 2018-09-17

## 2018-09-17 DIAGNOSIS — I48.0 PAROXYSMAL ATRIAL FIBRILLATION (HCC): ICD-10-CM

## 2018-09-17 DIAGNOSIS — I05.0 MITRAL VALVE STENOSIS, UNSPECIFIED ETIOLOGY: ICD-10-CM

## 2018-09-17 LAB
INR PPP: 3.86 (ref 0.87–1.13)
INR PPP: 3.86 (ref 2–3.5)
PROTHROMBIN TIME: 37.7 SEC (ref 12–14.6)

## 2018-09-17 PROCEDURE — 85610 PROTHROMBIN TIME: CPT

## 2018-09-17 PROCEDURE — 36415 COLL VENOUS BLD VENIPUNCTURE: CPT

## 2018-09-18 LAB — INR BLD: 2.7 (ref 0.9–1.2)

## 2018-09-18 NOTE — PROGRESS NOTES
"Anticoagulation Summary  As of 9/17/2018    INR goal:   2.5-3.5   TTR:   61.1 % (3.2 y)   Today's INR:   3.86!   Warfarin maintenance plan:   10 mg (5 mg x 2) on Mon, Wed, Fri; 5 mg (5 mg x 1) all other days   Weekly warfarin total:   50 mg   Plan last modified:   Taj Allen PharmD (9/12/2018)   Next INR check:   9/24/2018   Target end date:   Indefinite    Indications    Paroxysmal atrial fibrillation (HCC) [I48.0]  Mitral stenosis s/p Mechanical MVR [I05.0]             Anticoagulation Episode Summary     INR check location:   Outside Lab    Preferred lab:       Send INR reminders to:       Comments:   [facility]       Anticoagulation Care Providers     Provider Role Specialty Phone number    Bird Rodriguez D.O. Referring Family Medicine [phone]    Trey Dubon M.D. Referring Cardiac Surgery [phone]    Taj Allen PharmD Responsible          Anticoagulation Patient Findings  Patient Findings     Negatives:   Signs/symptoms of thrombosis, Signs/symptoms of bleeding, Laboratory test error suspected, Change in health, Change in alcohol use, Change in activity, Upcoming invasive procedure, Emergency department visit, Upcoming dental procedure, Missed doses, Extra doses, Change in medications, Change in diet/appetite, Hospital admission, Bruising, Other complaints        Spoke with patient today regarding supratherapeutic INR of 3.86.  Patient denies any signs/symptoms of bruising or bleeding or any changes in diet and medications.  Instructed patient to call clinic with any questions or concerns.  Patient would like to continue with current dosing as it has been decreased significantly over the last couple weeks.  She will begin drinking her \"green drink\" that she states has a good amount of VitK from vegetable sources.  Follow up in 1 weeks, to reduce risk of adverse events related to this high risk medication,  Warfarin.    Everett Dangelo, PharmD      "

## 2018-09-20 ENCOUNTER — IMMUNIZATION (OUTPATIENT)
Dept: OCCUPATIONAL MEDICINE | Facility: CLINIC | Age: 28
End: 2018-09-20

## 2018-09-20 DIAGNOSIS — Z23 NEED FOR VACCINATION: ICD-10-CM

## 2018-09-20 PROCEDURE — 90686 IIV4 VACC NO PRSV 0.5 ML IM: CPT | Performed by: PREVENTIVE MEDICINE

## 2018-09-25 ENCOUNTER — HOSPITAL ENCOUNTER (OUTPATIENT)
Dept: LAB | Facility: MEDICAL CENTER | Age: 28
End: 2018-09-25
Attending: NURSE PRACTITIONER
Payer: COMMERCIAL

## 2018-09-25 ENCOUNTER — ANTICOAGULATION MONITORING (OUTPATIENT)
Dept: VASCULAR LAB | Facility: MEDICAL CENTER | Age: 28
End: 2018-09-25

## 2018-09-25 DIAGNOSIS — I48.0 PAROXYSMAL ATRIAL FIBRILLATION (HCC): ICD-10-CM

## 2018-09-25 DIAGNOSIS — I05.0 MITRAL VALVE STENOSIS, UNSPECIFIED ETIOLOGY: ICD-10-CM

## 2018-09-25 LAB
INR PPP: 4.05 (ref 0.87–1.13)
PROTHROMBIN TIME: 39.4 SEC (ref 12–14.6)

## 2018-09-25 PROCEDURE — 36415 COLL VENOUS BLD VENIPUNCTURE: CPT

## 2018-09-25 PROCEDURE — 85610 PROTHROMBIN TIME: CPT

## 2018-09-25 NOTE — PROGRESS NOTES
Anticoagulation Summary  As of 9/25/2018    INR goal:   2.5-3.5   TTR:   60.7 % (3.3 y)   Today's INR:   4.05!   Warfarin maintenance plan:   10 mg (5 mg x 2) on Mon, Wed, Fri; 5 mg (5 mg x 1) all other days   Weekly warfarin total:   50 mg   Plan last modified:   Taj Allen PharmD (9/12/2018)   Next INR check:   10/2/2018   Target end date:   Indefinite    Indications    Paroxysmal atrial fibrillation (HCC) [I48.0]  Mitral stenosis s/p Mechanical MVR [I05.0]             Anticoagulation Episode Summary     INR check location:   Outside Lab    Preferred lab:       Send INR reminders to:       Comments:   [facility]       Anticoagulation Care Providers     Provider Role Specialty Phone number    Bird Rodriguez D.O. Referring Family Medicine [phone]    Trey Dubon M.D. Referring Cardiac Surgery [phone]    Taj Allen, [place] Responsible          Anticoagulation Patient Findings      INR  supra-therapeutic.   Left a voice message for the patient, asked patient to please call the anticoagulation clinic if they have any signs/symptoms of bleeding and/or thrombosis or any changes to diet or medications.    Follow up appointment in 1 week(s)    Hold tonight then continue weekly warfarin dose as noted      Tan Helton, PharmD

## 2018-10-01 ENCOUNTER — APPOINTMENT (OUTPATIENT)
Dept: DERMATOLOGY | Facility: IMAGING CENTER | Age: 28
End: 2018-10-01

## 2018-10-02 ENCOUNTER — HOSPITAL ENCOUNTER (OUTPATIENT)
Dept: LAB | Facility: MEDICAL CENTER | Age: 28
End: 2018-10-02
Attending: NURSE PRACTITIONER
Payer: COMMERCIAL

## 2018-10-02 DIAGNOSIS — I48.0 PAROXYSMAL ATRIAL FIBRILLATION (HCC): ICD-10-CM

## 2018-10-02 LAB
INR PPP: 3.72 (ref 0.87–1.13)
PROTHROMBIN TIME: 36.8 SEC (ref 12–14.6)

## 2018-10-02 PROCEDURE — 36415 COLL VENOUS BLD VENIPUNCTURE: CPT

## 2018-10-02 PROCEDURE — 85610 PROTHROMBIN TIME: CPT

## 2018-10-03 ENCOUNTER — ANTICOAGULATION MONITORING (OUTPATIENT)
Dept: VASCULAR LAB | Facility: MEDICAL CENTER | Age: 28
End: 2018-10-03

## 2018-10-03 DIAGNOSIS — I48.0 PAROXYSMAL ATRIAL FIBRILLATION (HCC): ICD-10-CM

## 2018-10-03 DIAGNOSIS — I05.0 MITRAL VALVE STENOSIS, UNSPECIFIED ETIOLOGY: ICD-10-CM

## 2018-10-03 NOTE — PROGRESS NOTES
Anticoagulation Summary  As of 10/3/2018    INR goal:   2.5-3.5   TTR:   60.3 % (3.3 y)   Today's INR:   3.72! (10/2/2018)   Warfarin maintenance plan:   10 mg (5 mg x 2) on Mon, Wed, Fri; 5 mg (5 mg x 1) all other days   Weekly warfarin total:   50 mg   Plan last modified:   Taj Allen PharmD (9/12/2018)   Next INR check:   10/10/2018   Target end date:   Indefinite    Indications    Paroxysmal atrial fibrillation (HCC) [I48.0]  Mitral stenosis s/p Mechanical MVR [I05.0]             Anticoagulation Episode Summary     INR check location:   Outside Lab    Preferred lab:       Send INR reminders to:       Comments:   [facility]       Anticoagulation Care Providers     Provider Role Specialty Phone number    Bird Rodriguez D.O. Referring Family Medicine [phone]    Trey Dubon M.D. Referring Cardiac Surgery [phone]    Taj Allen PharmD Responsible          Anticoagulation Patient Findings    Left voicemail message to report a supratherapeutic INR of 3.72.  Requested pt contact the clinic for any s/s of unusual bleeding, bruising, clotting or any changes to diet or medication.   Instructed patient to decrease weekly warfarin regimen by ~10% as detailed above.  FU 1 weeks.  Everett Dangelo, AgataD

## 2018-10-16 ENCOUNTER — HOSPITAL ENCOUNTER (OUTPATIENT)
Dept: LAB | Facility: MEDICAL CENTER | Age: 28
End: 2018-10-16
Attending: NURSE PRACTITIONER
Payer: COMMERCIAL

## 2018-10-16 LAB
INR PPP: 3.23 (ref 0.87–1.13)
PROTHROMBIN TIME: 33 SEC (ref 12–14.6)

## 2018-10-16 PROCEDURE — 85610 PROTHROMBIN TIME: CPT

## 2018-10-16 PROCEDURE — 36415 COLL VENOUS BLD VENIPUNCTURE: CPT

## 2018-10-17 ENCOUNTER — ANTICOAGULATION MONITORING (OUTPATIENT)
Dept: VASCULAR LAB | Facility: MEDICAL CENTER | Age: 28
End: 2018-10-17

## 2018-10-17 DIAGNOSIS — I48.0 PAROXYSMAL ATRIAL FIBRILLATION (HCC): ICD-10-CM

## 2018-10-17 DIAGNOSIS — I05.0 MITRAL VALVE STENOSIS, UNSPECIFIED ETIOLOGY: ICD-10-CM

## 2018-10-17 NOTE — PROGRESS NOTES
Anticoagulation Summary  As of 10/17/2018    INR goal:   2.5-3.5   TTR:   60.2 % (3.3 y)   Today's INR:   3.23 (10/16/2018)   Warfarin maintenance plan:   10 mg (5 mg x 2) on Mon, Fri; 5 mg (5 mg x 1) all other days   Weekly warfarin total:   45 mg   Plan last modified:   Everett Dangelo, PharmD (10/3/2018)   Next INR check:      Target end date:   Indefinite    Indications    Paroxysmal atrial fibrillation (HCC) [I48.0]  Mitral stenosis s/p Mechanical MVR [I05.0]             Anticoagulation Episode Summary     INR check location:   Outside Lab    Preferred lab:       Send INR reminders to:       Comments:   [facility]       Anticoagulation Care Providers     Provider Role Specialty Phone number    Bird Rodriguez D.O. Referring Family Medicine [phone]    Trey Dubon M.D. Referring Cardiac Surgery [phone]    Taj Allen, PharmD Responsible          Anticoagulation Patient Findings    INR therapeutic at 3.23.  Left message on phone.  Continue current regimen. Read regimen (date, day, strength, tabs) taking.  Check INR in 2 weeks (10/31).  Instructed pt to call clinic if any s/s of bleeding or changes in medications or diet. Instructed to call w/ any upcoming surgeries or procedures.  Instructed pt call clinic at [phone] if there are any questions.    Rl Fitch  2019 Doctor of Pharmacy Candidate

## 2018-10-29 ENCOUNTER — HOSPITAL ENCOUNTER (OUTPATIENT)
Dept: LAB | Facility: MEDICAL CENTER | Age: 28
End: 2018-10-29
Attending: NURSE PRACTITIONER
Payer: COMMERCIAL

## 2018-10-29 DIAGNOSIS — I48.0 PAROXYSMAL ATRIAL FIBRILLATION (HCC): ICD-10-CM

## 2018-10-29 LAB
INR PPP: 3.65 (ref 0.87–1.13)
PROTHROMBIN TIME: 36.3 SEC (ref 12–14.6)

## 2018-10-29 PROCEDURE — 36415 COLL VENOUS BLD VENIPUNCTURE: CPT

## 2018-10-29 PROCEDURE — 85610 PROTHROMBIN TIME: CPT

## 2018-10-30 ENCOUNTER — ANTICOAGULATION MONITORING (OUTPATIENT)
Dept: VASCULAR LAB | Facility: MEDICAL CENTER | Age: 28
End: 2018-10-30

## 2018-10-30 DIAGNOSIS — I05.0 MITRAL VALVE STENOSIS, UNSPECIFIED ETIOLOGY: ICD-10-CM

## 2018-10-30 DIAGNOSIS — I48.0 PAROXYSMAL ATRIAL FIBRILLATION (HCC): ICD-10-CM

## 2018-10-30 NOTE — PROGRESS NOTES
Anticoagulation Summary  As of 10/30/2018    INR goal:   2.5-3.5   TTR:   60.3 % (3.3 y)   Today's INR:   3.65! (10/29/2018)   Warfarin maintenance plan:   10 mg (5 mg x 2) on Mon, Fri; 5 mg (5 mg x 1) all other days   Weekly warfarin total:   45 mg   Plan last modified:   Everett Dangelo PharmD (10/3/2018)   Next INR check:   11/13/2018   Target end date:   Indefinite    Indications    Paroxysmal atrial fibrillation (HCC) [I48.0]  Mitral stenosis s/p Mechanical MVR [I05.0]             Anticoagulation Episode Summary     INR check location:   Outside Lab    Preferred lab:       Send INR reminders to:       Comments:   [facility]       Anticoagulation Care Providers     Provider Role Specialty Phone number    Bird Rodriguez D.O. Referring Family Medicine [phone]    Trey Dubon M.D. Referring Cardiac Surgery [phone]    Agata SantosD Responsible          Anticoagulation Patient Findings    Left a message on the patient's voicemail today, informing the patient of a SUPRA-therapeutic INR of 3.65.  Instructed patient to call the clinic with any changes to diet or medications, with any signs/sx of bleeding or clotting or with any questions or concerns.     Instructed patient to decrease dose to 2.5mg TONIGHT ONLY, then to resume her current dosing regimen. Patient asked to follow up again in 2 weeks.    Elizabeth ParmarD

## 2018-10-31 DIAGNOSIS — I48.0 PAF (PAROXYSMAL ATRIAL FIBRILLATION) (HCC): ICD-10-CM

## 2018-10-31 RX ORDER — METOPROLOL SUCCINATE 25 MG/1
25 TABLET, EXTENDED RELEASE ORAL
Qty: 90 TAB | Refills: 1 | Status: SHIPPED | OUTPATIENT
Start: 2018-10-31 | End: 2019-04-29 | Stop reason: SDUPTHER

## 2018-11-12 ENCOUNTER — APPOINTMENT (OUTPATIENT)
Dept: DERMATOLOGY | Facility: IMAGING CENTER | Age: 28
End: 2018-11-12

## 2018-11-13 ENCOUNTER — HOSPITAL ENCOUNTER (OUTPATIENT)
Dept: LAB | Facility: MEDICAL CENTER | Age: 28
End: 2018-11-13
Attending: ORTHOPAEDIC SURGERY
Payer: COMMERCIAL

## 2018-11-13 DIAGNOSIS — I48.0 PAROXYSMAL ATRIAL FIBRILLATION (HCC): ICD-10-CM

## 2018-11-13 LAB
INR PPP: 2.33 (ref 0.87–1.13)
PROTHROMBIN TIME: 25.6 SEC (ref 12–14.6)

## 2018-11-13 PROCEDURE — 36415 COLL VENOUS BLD VENIPUNCTURE: CPT

## 2018-11-13 PROCEDURE — 85610 PROTHROMBIN TIME: CPT

## 2018-11-14 ENCOUNTER — ANTICOAGULATION MONITORING (OUTPATIENT)
Dept: VASCULAR LAB | Facility: MEDICAL CENTER | Age: 28
End: 2018-11-14

## 2018-11-14 DIAGNOSIS — I48.0 PAROXYSMAL ATRIAL FIBRILLATION (HCC): ICD-10-CM

## 2018-11-14 DIAGNOSIS — I05.0 MITRAL VALVE STENOSIS, UNSPECIFIED ETIOLOGY: ICD-10-CM

## 2018-11-14 NOTE — PROGRESS NOTES
Anticoagulation Summary  As of 11/14/2018    INR goal:   2.5-3.5   TTR:   60.5 % (3.4 y)   Today's INR:   2.33! (11/13/2018)   Warfarin maintenance plan:   10 mg (5 mg x 2) on Mon, Fri; 5 mg (5 mg x 1) all other days   Weekly warfarin total:   45 mg   Plan last modified:   Everett Dangelo PharmD (10/3/2018)   Next INR check:   11/28/2018   Target end date:   Indefinite    Indications    Paroxysmal atrial fibrillation (HCC) [I48.0]  Mitral stenosis s/p Mechanical MVR [I05.0]             Anticoagulation Episode Summary     INR check location:   Outside Lab    Preferred lab:       Send INR reminders to:       Comments:   [facility]       Anticoagulation Care Providers     Provider Role Specialty Phone number    Bird Rodriguez D.O. Referring Family Medicine [phone]    Trey Dubon M.D. Referring Cardiac Surgery [phone]    Taj Allen, PharmD Responsible          Anticoagulation Patient Findings  Patient Findings     Negatives:   Signs/symptoms of thrombosis, Signs/symptoms of bleeding, Laboratory test error suspected, Change in health, Change in alcohol use, Change in activity, Upcoming invasive procedure, Emergency department visit, Upcoming dental procedure, Missed doses, Extra doses, Change in medications, Change in diet/appetite, Hospital admission, Bruising, Other complaints        Spoke with patient today regarding subtherapeutic INR of 2.33.  Patient denies any signs/symptoms of bruising or bleeding or any changes in diet and medications.  Instructed patient to call clinic with any questions or concerns.  Instructed patient to bolus with 7.5mg X 1, then resume current warfarin regimen.  Follow up in 2 weeks, to reduce risk of adverse events related to this high risk medication,  Warfarin.    Everett Dangelo, AgataD

## 2018-12-04 ENCOUNTER — HOSPITAL ENCOUNTER (OUTPATIENT)
Dept: LAB | Facility: MEDICAL CENTER | Age: 28
End: 2018-12-04
Attending: NURSE PRACTITIONER
Payer: COMMERCIAL

## 2018-12-04 DIAGNOSIS — I48.0 PAROXYSMAL ATRIAL FIBRILLATION (HCC): ICD-10-CM

## 2018-12-04 LAB
INR PPP: 4.54 (ref 0.87–1.13)
PROTHROMBIN TIME: 43 SEC (ref 12–14.6)

## 2018-12-04 PROCEDURE — 85610 PROTHROMBIN TIME: CPT

## 2018-12-04 PROCEDURE — 36415 COLL VENOUS BLD VENIPUNCTURE: CPT

## 2018-12-05 ENCOUNTER — ANTICOAGULATION MONITORING (OUTPATIENT)
Dept: VASCULAR LAB | Facility: MEDICAL CENTER | Age: 28
End: 2018-12-05

## 2018-12-05 DIAGNOSIS — I34.2 NON-RHEUMATIC MITRAL VALVE STENOSIS: ICD-10-CM

## 2018-12-05 DIAGNOSIS — I48.0 PAROXYSMAL ATRIAL FIBRILLATION (HCC): ICD-10-CM

## 2018-12-05 NOTE — PROGRESS NOTES
Anticoagulation Summary  As of 12/5/2018    INR goal:   2.5-3.5   TTR:   60.2 % (3.4 y)   Today's INR:   4.54! (12/4/2018)   Warfarin maintenance plan:   10 mg (5 mg x 2) on Mon, Fri; 5 mg (5 mg x 1) all other days   Weekly warfarin total:   45 mg   Plan last modified:   Everett Dangelo PharmD (10/3/2018)   Next INR check:   12/19/2018   Target end date:   Indefinite    Indications    Paroxysmal atrial fibrillation (HCC) [I48.0]  Mitral stenosis s/p Mechanical MVR [I05.0]             Anticoagulation Episode Summary     INR check location:   Outside Lab    Preferred lab:       Send INR reminders to:       Comments:   [facility]       Anticoagulation Care Providers     Provider Role Specialty Phone number    Bird Rodriguez D.O. Referring Family Medicine [phone]    Trey Dubon M.D. Referring Cardiac Surgery [phone]    Taj Allen, PharmD Responsible          Anticoagulation Patient Findings    Left voicemail message to report a supratherapeutic INR of 4.54.  Requested pt contact the clinic for any s/s of unusual bleeding, bruising, clotting or any changes to diet or medication.   Instructed patient to HOLD X 1, then resume current warfarin regimen.  FU 2 weeks.  Everett Dangelo, [place]

## 2018-12-08 DIAGNOSIS — E78.00 HYPERCHOLESTEROLEMIA: ICD-10-CM

## 2018-12-10 RX ORDER — ATORVASTATIN CALCIUM 40 MG/1
40 TABLET, FILM COATED ORAL EVERY EVENING
Qty: 90 TAB | Refills: 0 | Status: SHIPPED | OUTPATIENT
Start: 2018-12-10 | End: 2019-03-18 | Stop reason: SDUPTHER

## 2018-12-19 ENCOUNTER — HOSPITAL ENCOUNTER (OUTPATIENT)
Dept: LAB | Facility: MEDICAL CENTER | Age: 28
End: 2018-12-19
Attending: NURSE PRACTITIONER
Payer: COMMERCIAL

## 2018-12-19 DIAGNOSIS — I48.0 PAROXYSMAL ATRIAL FIBRILLATION (HCC): ICD-10-CM

## 2018-12-19 LAB
INR PPP: 3.56 (ref 0.87–1.13)
PROTHROMBIN TIME: 35.6 SEC (ref 12–14.6)

## 2018-12-19 PROCEDURE — 85610 PROTHROMBIN TIME: CPT

## 2018-12-19 PROCEDURE — 36415 COLL VENOUS BLD VENIPUNCTURE: CPT

## 2018-12-20 ENCOUNTER — ANTICOAGULATION MONITORING (OUTPATIENT)
Dept: VASCULAR LAB | Facility: MEDICAL CENTER | Age: 28
End: 2018-12-20

## 2018-12-20 DIAGNOSIS — I48.0 PAROXYSMAL ATRIAL FIBRILLATION (HCC): ICD-10-CM

## 2018-12-20 DIAGNOSIS — I34.2 NON-RHEUMATIC MITRAL VALVE STENOSIS: ICD-10-CM

## 2018-12-20 NOTE — PROGRESS NOTES
Anticoagulation Summary  As of 12/20/2018    INR goal:   2.5-3.5   TTR:   59.5 % (3.5 y)   Today's INR:   3.56! (12/19/2018)   Warfarin maintenance plan:   10 mg (5 mg x 2) on Mon, Fri; 5 mg (5 mg x 1) all other days   Weekly warfarin total:   45 mg   Plan last modified:   Everett Dangelo PharmD (10/3/2018)   Next INR check:   1/3/2019   Target end date:   Indefinite    Indications    Paroxysmal atrial fibrillation (HCC) [I48.0]  Mitral stenosis s/p Mechanical MVR [I05.0]             Anticoagulation Episode Summary     INR check location:   Outside Lab    Preferred lab:       Send INR reminders to:       Comments:   [facility]       Anticoagulation Care Providers     Provider Role Specialty Phone number    Bird Rodriguez D.O. Referring Family Medicine [phone]    Trey Dubon M.D. Referring Cardiac Surgery [phone]    Taj Allen, PharmD Responsible          Anticoagulation Patient Findings    Left voicemail message to report a supratherapeutic INR of 3.56.  Requested pt contact the clinic for any s/s of unusual bleeding, bruising, clotting or any changes to diet or medication.   Instructed patient to increase green vegetable intake this week and continue with current warfarin regimen.  FU 2 weeks.  Everett Dangelo, AgataD

## 2019-01-08 ENCOUNTER — HOSPITAL ENCOUNTER (OUTPATIENT)
Dept: LAB | Facility: MEDICAL CENTER | Age: 29
End: 2019-01-08
Attending: NURSE PRACTITIONER
Payer: COMMERCIAL

## 2019-01-08 DIAGNOSIS — I48.0 PAROXYSMAL ATRIAL FIBRILLATION (HCC): ICD-10-CM

## 2019-01-08 LAB
INR PPP: 3.91 (ref 0.87–1.13)
PROTHROMBIN TIME: 38.2 SEC (ref 12–14.6)

## 2019-01-08 PROCEDURE — 85610 PROTHROMBIN TIME: CPT

## 2019-01-08 PROCEDURE — 36415 COLL VENOUS BLD VENIPUNCTURE: CPT

## 2019-01-09 ENCOUNTER — ANTICOAGULATION MONITORING (OUTPATIENT)
Dept: VASCULAR LAB | Facility: MEDICAL CENTER | Age: 29
End: 2019-01-09

## 2019-01-09 DIAGNOSIS — I48.0 PAROXYSMAL ATRIAL FIBRILLATION (HCC): ICD-10-CM

## 2019-01-09 DIAGNOSIS — I34.2 NON-RHEUMATIC MITRAL VALVE STENOSIS: ICD-10-CM

## 2019-01-09 NOTE — PROGRESS NOTES
Anticoagulation Summary  As of 1/9/2019    INR goal:   2.5-3.5   TTR:   58.6 % (3.5 y)   Today's INR:   3.91! (1/8/2019)   Warfarin maintenance plan:   10 mg (5 mg x 2) on Mon; 5 mg (5 mg x 1) all other days   Weekly warfarin total:   40 mg   Plan last modified:   Fady Queen, Pharmacy Intern (1/9/2019)   Next INR check:   1/16/2019   Target end date:   Indefinite    Indications    Paroxysmal atrial fibrillation (HCC) [I48.0]  Mitral stenosis s/p Mechanical MVR [I05.0]             Anticoagulation Episode Summary     INR check location:   Outside Lab    Preferred lab:       Send INR reminders to:       Comments:   [facility]       Anticoagulation Care Providers     Provider Role Specialty Phone number    Bird Rodriguez D.O. Referring Family Medicine [phone]    Trey Dubon M.D. Referring Cardiac Surgery [phone]    Taj Allen, PharmD Responsible          Anticoagulation Patient Findings      Left message for patient.  INR is SUPRA therapeutic.   Pt instructed to report any unusual s/s of bleeding, bruising, clotting or any changes to diet or medications.  Verified warfarin weekly dosing.   CHADSVASC = 4  Clot hx N/A    Will have pt decrease weekly warfarin dose by 11.1%. Pt will now take 10 mg (5 mg x 2) on Mon and 5 mg (5 mg x 1) all other days.    Repeat INR in 1 week(s).     Discussed with [facility] Taj Queen, Pharmacy Intern

## 2019-01-29 ENCOUNTER — HOSPITAL ENCOUNTER (OUTPATIENT)
Dept: LAB | Facility: MEDICAL CENTER | Age: 29
End: 2019-01-29
Attending: NURSE PRACTITIONER
Payer: COMMERCIAL

## 2019-01-29 DIAGNOSIS — I48.0 PAROXYSMAL ATRIAL FIBRILLATION (HCC): ICD-10-CM

## 2019-01-29 LAB
INR PPP: 3.79 (ref 0.87–1.13)
PROTHROMBIN TIME: 37.4 SEC (ref 12–14.6)

## 2019-01-29 PROCEDURE — 36415 COLL VENOUS BLD VENIPUNCTURE: CPT

## 2019-01-29 PROCEDURE — 85610 PROTHROMBIN TIME: CPT

## 2019-01-30 ENCOUNTER — ANTICOAGULATION MONITORING (OUTPATIENT)
Dept: VASCULAR LAB | Facility: MEDICAL CENTER | Age: 29
End: 2019-01-30

## 2019-01-30 DIAGNOSIS — I48.0 PAROXYSMAL ATRIAL FIBRILLATION (HCC): ICD-10-CM

## 2019-01-30 NOTE — PROGRESS NOTES
Anticoagulation Summary  As of 1/30/2019    INR goal:   2.5-3.5   TTR:   57.6 % (3.6 y)   INR used for dosing:   3.79! (1/29/2019)   Warfarin maintenance plan:   5 mg (5 mg x 1) every day   Weekly warfarin total:   35 mg   Plan last modified:   Fady Queen, Pharmacy Intern (1/30/2019)   Next INR check:   2/6/2019   Target end date:   Indefinite    Indications    Paroxysmal atrial fibrillation (HCC) [I48.0]  Mitral stenosis s/p Mechanical MVR [I05.0]             Anticoagulation Episode Summary     INR check location:   Outside Lab    Preferred lab:       Send INR reminders to:       Comments:   [facility]       Anticoagulation Care Providers     Provider Role Specialty Phone number    Bird Rodriguez D.O. Referring Family Medicine [phone]    Trey Dubon M.D. Referring Cardiac Surgery [phone]    Taj Allen, PharmD Responsible          Anticoagulation Patient Findings      Left message for patient.  INR is supra therapeutic.   Pt instructed to report any unusual s/s of bleeding, bruising, clotting or any changes to diet or medications.  Verified warfarin weekly dosing.   CHADSVASC = 3  Clot hx none    Will have pt half tonight's dose and take 2.5 mg then decrease weekly warfarin regimen by 12.5% and take 5 mg every day.     Repeat INR in 1 week(s).     Discussed with jaz Queen, Pharmacy Intern

## 2019-02-19 ENCOUNTER — HOSPITAL ENCOUNTER (OUTPATIENT)
Dept: LAB | Facility: MEDICAL CENTER | Age: 29
End: 2019-02-19
Attending: NURSE PRACTITIONER
Payer: COMMERCIAL

## 2019-02-19 DIAGNOSIS — I48.0 PAROXYSMAL ATRIAL FIBRILLATION (HCC): ICD-10-CM

## 2019-02-19 LAB
INR PPP: 3.93 (ref 0.87–1.13)
PROTHROMBIN TIME: 38.4 SEC (ref 12–14.6)

## 2019-02-19 PROCEDURE — 85610 PROTHROMBIN TIME: CPT

## 2019-02-19 PROCEDURE — 36415 COLL VENOUS BLD VENIPUNCTURE: CPT

## 2019-02-20 ENCOUNTER — ANTICOAGULATION MONITORING (OUTPATIENT)
Dept: VASCULAR LAB | Facility: MEDICAL CENTER | Age: 29
End: 2019-02-20

## 2019-02-20 DIAGNOSIS — I48.0 PAROXYSMAL ATRIAL FIBRILLATION (HCC): ICD-10-CM

## 2019-02-20 NOTE — PROGRESS NOTES
Anticoagulation Summary  As of 2/20/2019    INR goal:   2.5-3.5   TTR:   56.7 % (3.7 y)   INR used for dosing:   3.93! (2/19/2019)   Warfarin maintenance plan:   2.5 mg (5 mg x 0.5) every Tue; 5 mg (5 mg x 1) all other days   Weekly warfarin total:   32.5 mg   Plan last modified:   Abram Gonzales, Pharmacy Intern (2/20/2019)   Next INR check:   2/26/2019   Target end date:   Indefinite    Indications    Paroxysmal atrial fibrillation (HCC) [I48.0]  Mitral stenosis s/p Mechanical MVR [I05.0]             Anticoagulation Episode Summary     INR check location:   Outside Lab    Preferred lab:       Send INR reminders to:       Comments:   [facility]       Anticoagulation Care Providers     Provider Role Specialty Phone number    Bird Rodriguez D.O. Referring Family Medicine [phone]    Trey Dubon M.D. Referring Cardiac Surgery [phone]    Taj Allen, PharmD Responsible          Anticoagulation Patient Findings    Discussed dosing plan with Taj Allen PharmD    Left voicemail message to patient's inbox to report a SUPRAtherapeutic INR of 3.93.      Will have pt hold dose of warfarin tonight      Patient instructed to take 2.5 mg of warfarin on Tuesdays and then take 5 mg all other days of the week.    Requested pt contact the clinic for any s/s of unusual bleeding, bruising, clotting or any changes to diet or medication.    Will follow up INR in 1 week(s) on Tuesday, February 26th    Abram Gonzales, Pharmacy Intern

## 2019-03-11 ENCOUNTER — HOSPITAL ENCOUNTER (OUTPATIENT)
Dept: LAB | Facility: MEDICAL CENTER | Age: 29
End: 2019-03-11
Attending: NURSE PRACTITIONER
Payer: COMMERCIAL

## 2019-03-11 DIAGNOSIS — I48.0 PAROXYSMAL ATRIAL FIBRILLATION (HCC): ICD-10-CM

## 2019-03-11 LAB
INR PPP: 4.6 (ref 0.87–1.13)
PROTHROMBIN TIME: 43.4 SEC (ref 12–14.6)

## 2019-03-11 PROCEDURE — 36415 COLL VENOUS BLD VENIPUNCTURE: CPT

## 2019-03-11 PROCEDURE — 85610 PROTHROMBIN TIME: CPT

## 2019-03-12 ENCOUNTER — ANTICOAGULATION MONITORING (OUTPATIENT)
Dept: VASCULAR LAB | Facility: MEDICAL CENTER | Age: 29
End: 2019-03-12

## 2019-03-12 DIAGNOSIS — I48.0 PAROXYSMAL ATRIAL FIBRILLATION (HCC): ICD-10-CM

## 2019-03-12 NOTE — PROGRESS NOTES
Anticoagulation Summary  As of 3/12/2019    INR goal:   2.5-3.5   TTR:   55.9 % (3.7 y)   INR used for dosin.60! (3/11/2019)   Warfarin maintenance plan:   2.5 mg (5 mg x 0.5) every Tue; 5 mg (5 mg x 1) all other days   Weekly warfarin total:   32.5 mg   Plan last modified:   Abram Gonzales, Pharmacy Intern (2019)   Next INR check:   3/19/2019   Target end date:   Indefinite    Indications    Paroxysmal atrial fibrillation (HCC) [I48.0]  Mitral stenosis s/p Mechanical MVR [I05.0]             Anticoagulation Episode Summary     INR check location:   Outside Lab    Preferred lab:       Send INR reminders to:       Comments:   [facility]       Anticoagulation Care Providers     Provider Role Specialty Phone number    Bird Rodriguez D.O. Referring Family Medicine [phone]    Trey Dubon M.D. Referring Cardiac Surgery [phone]    Taj Allen, PharmD Responsible          Anticoagulation Patient Findings      INR  supra-therapeutic.   Left a voice message for the patient, asked patient to please call the anticoagulation clinic if they have any signs/symptoms of bleeding and/or thrombosis or any changes to diet or medications.    Follow up appointment in 1 week(s)    Hold today then continue weekly warfarin dose as noted      Tan Helton, PharmD

## 2019-04-02 ENCOUNTER — HOSPITAL ENCOUNTER (OUTPATIENT)
Dept: LAB | Facility: MEDICAL CENTER | Age: 29
End: 2019-04-02
Attending: NURSE PRACTITIONER
Payer: COMMERCIAL

## 2019-04-02 DIAGNOSIS — I48.0 PAROXYSMAL ATRIAL FIBRILLATION (HCC): ICD-10-CM

## 2019-04-02 LAB
INR PPP: 4.32 (ref 0.87–1.13)
PROTHROMBIN TIME: 41.4 SEC (ref 12–14.6)

## 2019-04-02 PROCEDURE — 36415 COLL VENOUS BLD VENIPUNCTURE: CPT

## 2019-04-02 PROCEDURE — 85610 PROTHROMBIN TIME: CPT

## 2019-04-03 ENCOUNTER — ANTICOAGULATION MONITORING (OUTPATIENT)
Dept: VASCULAR LAB | Facility: MEDICAL CENTER | Age: 29
End: 2019-04-03

## 2019-04-03 DIAGNOSIS — I05.0 MITRAL VALVE STENOSIS, UNSPECIFIED ETIOLOGY: ICD-10-CM

## 2019-04-03 DIAGNOSIS — I48.0 PAROXYSMAL ATRIAL FIBRILLATION (HCC): ICD-10-CM

## 2019-04-03 NOTE — PROGRESS NOTES
Anticoagulation Summary  As of 4/3/2019    INR goal:   2.5-3.5   TTR:   55.0 % (3.8 y)   INR used for dosin.32! (2019)   Warfarin maintenance plan:   2.5 mg (5 mg x 0.5) every Tue, Sat; 5 mg (5 mg x 1) all other days   Weekly warfarin total:   30 mg   Plan last modified:   Everett Dangelo PharmD (4/3/2019)   Next INR check:   2019   Target end date:   Indefinite    Indications    Paroxysmal atrial fibrillation (HCC) [I48.0]  Mitral stenosis s/p Mechanical MVR [I05.0]             Anticoagulation Episode Summary     INR check location:   Outside Lab    Preferred lab:       Send INR reminders to:       Comments:   [facility]       Anticoagulation Care Providers     Provider Role Specialty Phone number    Bird Rodriguez D.O. Referring Family Medicine [phone]    Trey Dubon M.D. Referring Cardiac Surgery [phone]    Agata SantosD Responsible          Anticoagulation Patient Findings    Left voicemail message to report a surpatherapeutic INR of 4.32.  Requested pt contact the clinic for any s/s of unusual bleeding, bruising, clotting or any changes to diet or medication.  Instructed patient to HOLD X 1, then resume current warfarin regimen.  FU 2 weeks.  Everett Dangelo, AgataD

## 2019-04-18 ENCOUNTER — NON-PROVIDER VISIT (OUTPATIENT)
Dept: CARDIOLOGY | Facility: MEDICAL CENTER | Age: 29
End: 2019-04-18
Payer: COMMERCIAL

## 2019-04-18 ENCOUNTER — OFFICE VISIT (OUTPATIENT)
Dept: CARDIOLOGY | Facility: MEDICAL CENTER | Age: 29
End: 2019-04-18
Payer: COMMERCIAL

## 2019-04-18 VITALS
HEIGHT: 62 IN | HEART RATE: 82 BPM | WEIGHT: 151 LBS | SYSTOLIC BLOOD PRESSURE: 98 MMHG | BODY MASS INDEX: 27.79 KG/M2 | OXYGEN SATURATION: 96 % | DIASTOLIC BLOOD PRESSURE: 70 MMHG

## 2019-04-18 DIAGNOSIS — Z95.0 PACEMAKER: ICD-10-CM

## 2019-04-18 DIAGNOSIS — Z95.4 S/P MITRAL VALVE REPLACEMENT WITH METALLIC VALVE: ICD-10-CM

## 2019-04-18 DIAGNOSIS — I48.0 PAF (PAROXYSMAL ATRIAL FIBRILLATION) (HCC): ICD-10-CM

## 2019-04-18 DIAGNOSIS — E78.00 HYPERCHOLESTEROLEMIA: ICD-10-CM

## 2019-04-18 DIAGNOSIS — Z79.01 CHRONIC ANTICOAGULATION: ICD-10-CM

## 2019-04-18 PROCEDURE — 93280 PM DEVICE PROGR EVAL DUAL: CPT | Performed by: INTERNAL MEDICINE

## 2019-04-18 PROCEDURE — 99214 OFFICE O/P EST MOD 30 MIN: CPT | Mod: 25 | Performed by: INTERNAL MEDICINE

## 2019-04-18 RX ORDER — ATORVASTATIN CALCIUM 40 MG/1
40 TABLET, FILM COATED ORAL DAILY
Qty: 90 TAB | Refills: 3 | Status: SHIPPED | OUTPATIENT
Start: 2019-04-18 | End: 2020-04-02 | Stop reason: SDUPTHER

## 2019-04-18 ASSESSMENT — ENCOUNTER SYMPTOMS
WEIGHT LOSS: 0
MYALGIAS: 0
NAUSEA: 0
NERVOUS/ANXIOUS: 0
SHORTNESS OF BREATH: 0
DIZZINESS: 0
DOUBLE VISION: 0
BLOOD IN STOOL: 0
PALPITATIONS: 1
FOCAL WEAKNESS: 0
SENSORY CHANGE: 0
VOMITING: 0

## 2019-04-18 NOTE — PROGRESS NOTES
Chief Complaint   Patient presents with   • S/p mechanical MVR 4/2011     FV   Pacemaker in situ (generator change 7/2018)  Chronic anticoagulation  Hypercholesterolemia    Subjective:   Sameer Boston is a 29 y.o. female who presents today for f/u above issues    The patient is doing well from cardiac standpoint.   She denies any chest pain, no limitation or heart failure type symptoms.  Occasional palpitations controlled with metoprolol  Denies any side effect from medications.  No bleeding issue  INR has been in the high 3 to low four, monitored by Coumadin clinic  Pacer check earlier today functioning appropriately    No lipid profile since last June    Past Medical History:   Diagnosis Date   • Anemia 4/17/2011   • CHF (congestive heart failure) (HCC)    • Chronic anticoagulation    • CVA (cerebral infarction) 2011   • Elbow fracture    • History of atrial fibrillation    • Hyperlipidemia    • Left atrial thrombus    • Migraine     occasional   • Mitral stenosis     s/p mechanical MVR on coumadin   • Pacemaker     [place] for 3rd degree AVB   • Pulmonary hypertension (HCC)    • Third degree heart block (HCC)    • Tricuspid regurgitation     s/p repair     Past Surgical History:   Procedure Laterality Date   • ELBOW ORIF Right 1/26/2016    Procedure: ELBOW ORIF olecranon;  Surgeon: Fausto Nunez M.D.;  Location: SURGERY [facility];  Service:    • PELVISCOPY  9/6/2011    Performed by SHERRY HEREDIA at SURGERY SAME DAY [facility]S   • INTRA UTERINE DEVICE REMOVAL  9/6/2011    Performed by SHERRY HEREDIA at SURGERY SAME DAY [facility]   • TUBAL COAGULATION LAPAROSCOPIC BILATERAL  9/6/2011    Performed by SHERRY HEREDIA at SURGERY SAME DAY [facility]   • MITRAL VALVE REPLACE  4/25/2011    Performed by RALPH MCNEIL at SURGERY [facility] ORS   • TRICUSPID VALVE REPAIR  4/25/2011    Performed by RALPH MCNEIL at SURGERY [facility] ORS   • MASS EXCISION GENERAL  4/25/2011     Performed by RALPH MCNEIL at SURGERY [facility]   • PRIMARY C SECTION  4/6/2011    Performed by DEBBIE MAXWELL at LABOR AND DELIVERY   • PACEMAKER INSERTION  2011     Family History   Problem Relation Age of Onset   • Diabetes Maternal Grandmother         IDDM   • Hypertension Maternal Grandmother         meds   • Heart Disease Maternal Grandfather    • Diabetes Paternal Grandmother         esrd     Social History     Social History   • Marital status: Single     Spouse name: N/A   • Number of children: N/A   • Years of education: N/A     Occupational History   • Not on file.     Social History Main Topics   • Smoking status: Never Smoker   • Smokeless tobacco: Never Used   • Alcohol use No   • Drug use: No   • Sexual activity: Not Currently     Partners: Male     Other Topics Concern   • Not on file     Social History Narrative   • No narrative on file     Allergies   Allergen Reactions   • No Known Drug Allergy      Outpatient Encounter Prescriptions as of 4/18/2019   Medication Sig Dispense Refill   • atorvastatin (LIPITOR) 40 MG Tab TAKE 1 TABLET BY MOUTH ONCE DAILY IN THE EVENING (MUST  BE  SEEN  FOR  FURTHER  REFILLS) 30 Tab 0   • warfarin (COUMADIN) 5 MG Tab Take 1-2 Tabs by mouth every day. No refills until next INR is obtained. 135 Tab 0   • metoprolol SR (TOPROL XL) 25 MG TABLET SR 24 HR Take 1 Tab by mouth every bedtime. 90 Tab 1   • therapeutic multivitamin-minerals (THERAGRAN-M) Tab Take 1 Tab by mouth every day. 30 Tab 0   • doxycycline (VIBRAMYCIN) 100 MG Cap Take 1 Cap by mouth 2 times a day. (Patient not taking: Reported on 4/18/2019) 8 Cap 0   • acetaminophen (TYLENOL) 325 MG Tab Take 325-650 mg by mouth as needed (headache).     • sumatriptan (IMITREX) 20 MG/ACT nasal spray Spray 1 Spray in nose as needed for Migraine. (Patient not taking: Reported on 4/18/2019) 1 Each 0   • Cholecalciferol (VITAMIN D) 2000 UNITS Cap Take 1 Cap by mouth every day. 30 Cap 0     No facility-administered  "encounter medications on file as of 4/18/2019.      Review of Systems   Constitutional: Negative for malaise/fatigue and weight loss.   HENT: Negative for nosebleeds.    Eyes: Negative for double vision.   Respiratory: Negative for shortness of breath.    Cardiovascular: Positive for palpitations. Negative for chest pain and leg swelling.   Gastrointestinal: Negative for blood in stool, nausea and vomiting.   Genitourinary: Negative for hematuria.   Musculoskeletal: Negative for myalgias.   Skin: Positive for itching.        Around sternotomy scar keloid   Neurological: Negative for dizziness, sensory change and focal weakness.   Psychiatric/Behavioral: The patient is not nervous/anxious.         Objective:   BP (!) 98/70 (BP Location: Left arm, Patient Position: Sitting, BP Cuff Size: Adult)   Pulse 82   Ht 1.575 m (5' 2\")   Wt 68.5 kg (151 lb)   SpO2 96%   BMI 27.62 kg/m²     Physical Exam   Constitutional: She is oriented to person, place, and time. No distress.   HENT:   Head: Normocephalic and atraumatic.   Eyes: EOM are normal. Right eye exhibits no discharge. Left eye exhibits no discharge.   Neck: No JVD present. No thyromegaly present.   Cardiovascular: Normal rate and regular rhythm.  Exam reveals no gallop.    No murmur heard.  Good mitral metalic click   Pulmonary/Chest: Effort normal. No respiratory distress.   Abdominal: Soft. There is no tenderness.   Musculoskeletal: She exhibits no edema or deformity.   Neurological: She is alert and oriented to person, place, and time.   Skin: Skin is warm.   Large keloid on her chest   Psychiatric: She has a normal mood and affect. Her behavior is normal.       Assessment:     1. S/P mitral valve replacement with metallic valve      4/2011   2. PAF (paroxysmal atrial fibrillation) ([facility])     3. Chronic anticoagulation     4. Pacemaker         Medical Decision Making:  Today's Assessment / Status / Plan:     The patient's above cardiovascular conditions are " stable.  We will repeat lipid profile and  continue current medications for now.  Will make adjustment of her statin as needed.   Will have the patient return for a followup in 1 year. Will be happy to see the patient sooner as needed. Thank you for allowing me to participate in the caring of this patient.

## 2019-04-18 NOTE — LETTER
[facility]   [address]  [place] [address]  Phone: [phone]  Fax: [phone]              Sameer Boston  1990    Encounter Date: 4/18/2019    Anna Mahmood M.D.          PROGRESS NOTE:  No notes on file      No Recipients

## 2019-04-29 DIAGNOSIS — I48.0 PAF (PAROXYSMAL ATRIAL FIBRILLATION) (HCC): ICD-10-CM

## 2019-04-29 RX ORDER — METOPROLOL SUCCINATE 25 MG/1
25 TABLET, EXTENDED RELEASE ORAL
Qty: 90 TAB | Refills: 3 | Status: SHIPPED | OUTPATIENT
Start: 2019-04-29 | End: 2020-04-02 | Stop reason: SDUPTHER

## 2019-04-30 ENCOUNTER — HOSPITAL ENCOUNTER (OUTPATIENT)
Dept: LAB | Facility: MEDICAL CENTER | Age: 29
End: 2019-04-30
Attending: NURSE PRACTITIONER
Payer: COMMERCIAL

## 2019-04-30 ENCOUNTER — ANTICOAGULATION MONITORING (OUTPATIENT)
Dept: VASCULAR LAB | Facility: MEDICAL CENTER | Age: 29
End: 2019-04-30

## 2019-04-30 DIAGNOSIS — I48.0 PAROXYSMAL ATRIAL FIBRILLATION (HCC): ICD-10-CM

## 2019-04-30 LAB
INR PPP: 3.79 (ref 0.87–1.13)
PROTHROMBIN TIME: 37.3 SEC (ref 12–14.6)

## 2019-04-30 PROCEDURE — 36415 COLL VENOUS BLD VENIPUNCTURE: CPT

## 2019-04-30 PROCEDURE — 85610 PROTHROMBIN TIME: CPT

## 2019-05-01 ENCOUNTER — ANTICOAGULATION MONITORING (OUTPATIENT)
Dept: VASCULAR LAB | Facility: MEDICAL CENTER | Age: 29
End: 2019-05-01

## 2019-05-01 ENCOUNTER — TELEPHONE (OUTPATIENT)
Dept: MEDICAL GROUP | Facility: MEDICAL CENTER | Age: 29
End: 2019-05-01

## 2019-05-01 DIAGNOSIS — I05.0 MITRAL VALVE STENOSIS, UNSPECIFIED ETIOLOGY: ICD-10-CM

## 2019-05-01 DIAGNOSIS — I48.0 PAROXYSMAL ATRIAL FIBRILLATION (HCC): ICD-10-CM

## 2019-05-01 NOTE — TELEPHONE ENCOUNTER
Please check with pt and see if she is still seeing me as PCP.  If yes, let me know to order lab.  She is due lab and appt

## 2019-05-01 NOTE — PROGRESS NOTES
Anticoagulation Summary  As of 5/1/2019    INR goal:   2.5-3.5   TTR:   53.9 % (3.8 y)   INR used for dosing:   3.79! (4/30/2019)   Warfarin maintenance plan:   2.5 mg (5 mg x 0.5) every Mon, Wed, Fri; 5 mg (5 mg x 1) all other days   Weekly warfarin total:   27.5 mg   Plan last modified:   Everett Dangelo PharmD (5/1/2019)   Next INR check:   5/15/2019   Target end date:   Indefinite    Indications    Paroxysmal atrial fibrillation (HCC) [I48.0]  Mitral stenosis s/p Mechanical MVR [I05.0]             Anticoagulation Episode Summary     INR check location:   Outside Lab    Preferred lab:       Send INR reminders to:       Comments:   [facility]       Anticoagulation Care Providers     Provider Role Specialty Phone number    Bird Rodriguez D.O. Referring Family Medicine [phone]    Trey Dubon M.D. Referring Cardiac Surgery [phone]    Taj Allen, PharmD Responsible          Anticoagulation Patient Findings    Left voicemail message to report a supratherapeutic INR of 3.79.  Requested pt contact the clinic for any s/s of unusual bleeding, bruising, clotting or any changes to diet or medication.   Instructed patient to decrease weekly warfarin regimen by ~8% as detailed above.  FU 2 weeks.  Everett Dangelo, AgataD

## 2019-05-15 ENCOUNTER — TELEPHONE (OUTPATIENT)
Dept: MEDICAL GROUP | Facility: MEDICAL CENTER | Age: 29
End: 2019-05-15

## 2019-05-15 NOTE — LETTER
May 16, 2019        Sameer Boston  [address]  [facility] [address]        Dear Sameer:    Anne Snider's office has tried contacting you. At your earliest convenience please contact our office at [phone].      If you have any questions or concerns, please don't hesitate to call.        Sincerely,        MARCIA Rey.    Electronically Signed

## 2019-05-29 ENCOUNTER — HOSPITAL ENCOUNTER (OUTPATIENT)
Dept: LAB | Facility: MEDICAL CENTER | Age: 29
End: 2019-05-29
Attending: NURSE PRACTITIONER
Payer: COMMERCIAL

## 2019-05-29 DIAGNOSIS — I48.0 PAROXYSMAL ATRIAL FIBRILLATION (HCC): ICD-10-CM

## 2019-05-29 LAB
INR PPP: 2.98 (ref 0.87–1.13)
PROTHROMBIN TIME: 31 SEC (ref 12–14.6)

## 2019-05-29 PROCEDURE — 85610 PROTHROMBIN TIME: CPT

## 2019-05-29 PROCEDURE — 36415 COLL VENOUS BLD VENIPUNCTURE: CPT

## 2019-05-30 ENCOUNTER — ANTICOAGULATION MONITORING (OUTPATIENT)
Dept: VASCULAR LAB | Facility: MEDICAL CENTER | Age: 29
End: 2019-05-30

## 2019-05-30 DIAGNOSIS — I48.0 PAROXYSMAL ATRIAL FIBRILLATION (HCC): ICD-10-CM

## 2019-05-30 DIAGNOSIS — I05.0 MITRAL VALVE STENOSIS, UNSPECIFIED ETIOLOGY: ICD-10-CM

## 2019-05-30 NOTE — PROGRESS NOTES
Anticoagulation Summary  As of 2019    INR goal:   2.5-3.5   TTR:   54.1 % (3.9 y)   INR used for dosin.98 (2019)   Warfarin maintenance plan:   2.5 mg (5 mg x 0.5) every Mon, Wed, Fri; 5 mg (5 mg x 1) all other days   Weekly warfarin total:   27.5 mg   Plan last modified:   Everett Dangelo, PharmD (2019)   Next INR check:   2019   Target end date:   Indefinite    Indications    Paroxysmal atrial fibrillation (HCC) [I48.0]  Mitral stenosis s/p Mechanical MVR [I05.0]             Anticoagulation Episode Summary     INR check location:   Outside Lab    Preferred lab:       Send INR reminders to:       Comments:   [facility]       Anticoagulation Care Providers     Provider Role Specialty Phone number    Bird Rodriguez D.O. Referring Family Medicine [phone]    Trey Dubon M.D. Referring Cardiac Surgery [phone]    Taj Allen, PharmD Responsible          Anticoagulation Patient Findings     Left a message for pt. Instructed her to call if she has any s/sx of bleeding or changes in medications. Pt is to continue with current warfarin dosing regimen.  Follow up in 4 weeks, to reduce risk of adverse events related to this high risk medication,  Warfarin.    Anthony Reed, Pharmacy Intern

## 2019-06-03 ENCOUNTER — APPOINTMENT (OUTPATIENT)
Dept: DERMATOLOGY | Facility: IMAGING CENTER | Age: 29
End: 2019-06-03

## 2019-06-17 ENCOUNTER — HOSPITAL ENCOUNTER (OUTPATIENT)
Dept: LAB | Facility: MEDICAL CENTER | Age: 29
End: 2019-06-17
Attending: NURSE PRACTITIONER
Payer: COMMERCIAL

## 2019-06-17 DIAGNOSIS — I48.0 PAROXYSMAL ATRIAL FIBRILLATION (HCC): ICD-10-CM

## 2019-06-17 LAB
INR PPP: 2.78 (ref 0.87–1.13)
PROTHROMBIN TIME: 30.4 SEC (ref 12–14.6)

## 2019-06-17 PROCEDURE — 36415 COLL VENOUS BLD VENIPUNCTURE: CPT

## 2019-06-17 PROCEDURE — 85610 PROTHROMBIN TIME: CPT

## 2019-06-18 ENCOUNTER — ANTICOAGULATION MONITORING (OUTPATIENT)
Dept: VASCULAR LAB | Facility: MEDICAL CENTER | Age: 29
End: 2019-06-18

## 2019-06-18 DIAGNOSIS — I05.0 MITRAL VALVE STENOSIS, UNSPECIFIED ETIOLOGY: ICD-10-CM

## 2019-06-18 DIAGNOSIS — I48.0 PAROXYSMAL ATRIAL FIBRILLATION (HCC): ICD-10-CM

## 2019-06-18 NOTE — PROGRESS NOTES
Anticoagulation Summary  As of 2019    INR goal:   2.5-3.5   TTR:   54.7 % (4 y)   INR used for dosin.78 (2019)   Warfarin maintenance plan:   2.5 mg (5 mg x 0.5) every Mon, Wed, Fri; 5 mg (5 mg x 1) all other days   Weekly warfarin total:   27.5 mg   No change documented:   Mike Couch Ass't   Plan last modified:   Everett Dangelo, PharmD (2019)   Next INR check:   2019   Target end date:   Indefinite    Indications    Paroxysmal atrial fibrillation (HCC) [I48.0]  Mitral stenosis s/p Mechanical MVR [I05.0]             Anticoagulation Episode Summary     INR check location:   Outside Lab    Preferred lab:       Send INR reminders to:       Comments:   [facility]       Anticoagulation Care Providers     Provider Role Specialty Phone number    Bird Rodriguez D.O. Referring Family Medicine [phone]    Trey Dubon M.D. Referring Cardiac Surgery [phone]    Taj Allen, PharmD Responsible          Anticoagulation Patient Findings  Patient Findings     Negatives:   Signs/symptoms of thrombosis, Signs/symptoms of bleeding, Laboratory test error suspected, Change in health, Change in alcohol use, Change in activity, Upcoming invasive procedure, Emergency department visit, Upcoming dental procedure, Missed doses, Extra doses, Change in medications, Change in diet/appetite, Hospital admission, Bruising, Other complaints      Left voicemail message to report 2.78 therapeutic INR of 2.5-3.5.  Patient to continue with current warfarin dosing regimen. Requested pt contact the clinic for any change to diet or medication, and to report any signs or symptoms of bleeding, bruising or clotting.  Pt to follow up in 4 weeks.  Zakia Lipscomb Med Ass't        I have reviewed and concur with the above plan     Tan Helton, PharmD

## 2019-07-18 ENCOUNTER — TELEPHONE (OUTPATIENT)
Dept: MEDICAL GROUP | Facility: MEDICAL CENTER | Age: 29
End: 2019-07-18

## 2019-07-18 NOTE — LETTER
July 23, 2019        Sameer Boston  [address]  [facility] [address]        Dear Sameer:    Anne Snider's office has tried contacting you. At your earliest convenience please contact our office at [phone].      If you have any questions or concerns, please don't hesitate to call.        Sincerely,        TERI ReyPJUSTIN.    Electronically Signed

## 2019-07-30 ENCOUNTER — HOSPITAL ENCOUNTER (OUTPATIENT)
Dept: LAB | Facility: MEDICAL CENTER | Age: 29
End: 2019-07-30
Attending: NURSE PRACTITIONER
Payer: COMMERCIAL

## 2019-07-30 DIAGNOSIS — Z79.01 CHRONIC ANTICOAGULATION: ICD-10-CM

## 2019-07-30 LAB
INR PPP: 3.65 (ref 0.87–1.13)
PROTHROMBIN TIME: 37.7 SEC (ref 12–14.6)

## 2019-07-30 PROCEDURE — 36415 COLL VENOUS BLD VENIPUNCTURE: CPT

## 2019-07-30 PROCEDURE — 85610 PROTHROMBIN TIME: CPT

## 2019-07-31 ENCOUNTER — ANTICOAGULATION MONITORING (OUTPATIENT)
Dept: VASCULAR LAB | Facility: MEDICAL CENTER | Age: 29
End: 2019-07-31

## 2019-07-31 DIAGNOSIS — I05.0 MITRAL VALVE STENOSIS, UNSPECIFIED ETIOLOGY: ICD-10-CM

## 2019-07-31 DIAGNOSIS — I48.0 PAROXYSMAL ATRIAL FIBRILLATION (HCC): ICD-10-CM

## 2019-07-31 NOTE — PROGRESS NOTES
Anticoagulation Summary  As of 7/31/2019    INR goal:   2.5-3.5   TTR:   55.5 % (4.1 y)   INR used for dosing:   3.65! (7/30/2019)   Warfarin maintenance plan:   2.5 mg (5 mg x 0.5) every Mon, Wed, Fri; 5 mg (5 mg x 1) all other days   Weekly warfarin total:   27.5 mg   Plan last modified:   Everett Dangelo PharmD (5/1/2019)   Next INR check:   8/21/2019   Target end date:   Indefinite    Indications    Paroxysmal atrial fibrillation (HCC) [I48.0]  Mitral stenosis s/p Mechanical MVR [I05.0]             Anticoagulation Episode Summary     INR check location:   Outside Lab    Preferred lab:       Send INR reminders to:       Comments:   [facility]       Anticoagulation Care Providers     Provider Role Specialty Phone number    Bird Rodriguez D.O. Referring Family Medicine [phone]    Trey Dubon M.D. Referring Cardiac Surgery [phone]    Taj Allen, PharmD Responsible          Anticoagulation Patient Findings  Patient Findings     Negatives:   Signs/symptoms of thrombosis, Signs/symptoms of bleeding, Laboratory test error suspected, Change in health, Change in alcohol use, Change in activity, Upcoming invasive procedure, Emergency department visit, Upcoming dental procedure, Missed doses, Extra doses, Change in medications, Change in diet/appetite, Hospital admission, Bruising, Other complaints        Spoke with patient today regarding surpatherapeutic INR of 3.65.  Patient denies any signs/symptoms of bruising or bleeding or any changes in diet and medications.  Instructed patient to call clinic with any questions or concerns.  Instructed patient to HOLD X 1, then resume current warfarin regimen.  Follow up in 3 weeks, to reduce risk of adverse events related to this high risk medication,  Warfarin.    Everett Dangelo, PharmD, BCACP

## 2019-08-01 ENCOUNTER — ANTICOAGULATION MONITORING (OUTPATIENT)
Dept: VASCULAR LAB | Facility: MEDICAL CENTER | Age: 29
End: 2019-08-01

## 2019-08-01 DIAGNOSIS — I48.0 PAROXYSMAL ATRIAL FIBRILLATION (HCC): ICD-10-CM

## 2019-08-01 DIAGNOSIS — I05.0 MITRAL VALVE STENOSIS, UNSPECIFIED ETIOLOGY: ICD-10-CM

## 2019-09-04 ENCOUNTER — HOSPITAL ENCOUNTER (OUTPATIENT)
Dept: LAB | Facility: MEDICAL CENTER | Age: 29
End: 2019-09-04
Attending: NURSE PRACTITIONER
Payer: COMMERCIAL

## 2019-09-04 DIAGNOSIS — Z79.01 CHRONIC ANTICOAGULATION: ICD-10-CM

## 2019-09-04 LAB
INR PPP: 4.06 (ref 0.87–1.13)
PROTHROMBIN TIME: 41.1 SEC (ref 12–14.6)

## 2019-09-04 PROCEDURE — 85610 PROTHROMBIN TIME: CPT

## 2019-09-04 PROCEDURE — 36415 COLL VENOUS BLD VENIPUNCTURE: CPT

## 2019-09-05 ENCOUNTER — ANTICOAGULATION MONITORING (OUTPATIENT)
Dept: VASCULAR LAB | Facility: MEDICAL CENTER | Age: 29
End: 2019-09-05

## 2019-09-05 DIAGNOSIS — I48.0 PAROXYSMAL ATRIAL FIBRILLATION (HCC): ICD-10-CM

## 2019-09-05 DIAGNOSIS — Z79.01 CHRONIC ANTICOAGULATION: ICD-10-CM

## 2019-09-05 DIAGNOSIS — I05.0 MITRAL VALVE STENOSIS, UNSPECIFIED ETIOLOGY: ICD-10-CM

## 2019-09-05 NOTE — PROGRESS NOTES
Anticoagulation Summary  As of 2019    INR goal:   2.5-3.5   TTR:   54.2 % (4.2 y)   INR used for dosin.06! (2019)   Warfarin maintenance plan:   5 mg (5 mg x 1) every Mon, Wed, Fri; 2.5 mg (5 mg x 0.5) all other days   Weekly warfarin total:   25 mg   Plan last modified:   Nina Membreno (2019)   Next INR check:      Target end date:   Indefinite    Indications    Paroxysmal atrial fibrillation (HCC) [I48.0]  Mitral stenosis s/p Mechanical MVR [I05.0]             Anticoagulation Episode Summary     INR check location:   Outside Lab    Preferred lab:       Send INR reminders to:       Comments:   [facility]       Anticoagulation Care Providers     Provider Role Specialty Phone number    Bird Rodriguez D.O. Referring Family Medicine [phone]    Trey Dubon M.D. Referring Cardiac Surgery [phone]    Taj Allen, PharmD Responsible          Anticoagulation Patient Findings          Left voicemail message to report a supra therapeutic INR of 4.06.  Pt to continue with current warfarin dosing regimen. Requested pt contact the clinic for any s/s of unusual bleeding, bruising, clotting or any changes to diet or medication.  Follow up in 2 weeks, to reduce the risk of adverse events related to this high risk medication, warfarin.    Nina Membreno, Clinical Pharmacist

## 2019-09-07 ENCOUNTER — HOSPITAL ENCOUNTER (OUTPATIENT)
Dept: LAB | Facility: MEDICAL CENTER | Age: 29
End: 2019-09-07
Payer: COMMERCIAL

## 2019-09-07 LAB
CHOLEST SERPL-MCNC: 183 MG/DL (ref 100–199)
DIABETES HTDIA: NO
EVENT NAME HTEVT: NORMAL
FASTING HTFAS: YES
GLUCOSE SERPL-MCNC: 80 MG/DL (ref 65–99)
HDLC SERPL-MCNC: 47 MG/DL
LDLC SERPL CALC-MCNC: 114 MG/DL
SCREENING LOC CITY HTCIT: NORMAL
SCREENING LOC STATE HTSTA: NORMAL
SCREENING LOCATION HTLOC: NORMAL
SMOKING HTSMO: NO
SUBSCRIBER ID HTSID: NORMAL
TRIGL SERPL-MCNC: 109 MG/DL (ref 0–149)

## 2019-09-07 PROCEDURE — 80061 LIPID PANEL: CPT

## 2019-09-07 PROCEDURE — 82947 ASSAY GLUCOSE BLOOD QUANT: CPT

## 2019-09-07 PROCEDURE — 36415 COLL VENOUS BLD VENIPUNCTURE: CPT

## 2019-09-07 PROCEDURE — S5190 WELLNESS ASSESSMENT BY NONPH: HCPCS

## 2019-09-10 LAB
BDY FAT % MEASURED: NORMAL %
BP DIAS: 60 MMHG
BP SYS: 98 MMHG
HYPERTENSION HTHYP: NO

## 2019-09-12 ENCOUNTER — OFFICE VISIT (OUTPATIENT)
Dept: MEDICAL GROUP | Facility: MEDICAL CENTER | Age: 29
End: 2019-09-12
Payer: COMMERCIAL

## 2019-09-12 VITALS
WEIGHT: 157 LBS | BODY MASS INDEX: 28.89 KG/M2 | TEMPERATURE: 97.3 F | SYSTOLIC BLOOD PRESSURE: 100 MMHG | DIASTOLIC BLOOD PRESSURE: 60 MMHG | HEART RATE: 87 BPM | OXYGEN SATURATION: 96 % | HEIGHT: 62 IN

## 2019-09-12 DIAGNOSIS — E78.5 HYPERLIPIDEMIA LDL GOAL <100: ICD-10-CM

## 2019-09-12 DIAGNOSIS — R77.1 ABNORMALITY OF GLOBULIN: ICD-10-CM

## 2019-09-12 PROCEDURE — 99214 OFFICE O/P EST MOD 30 MIN: CPT | Performed by: NURSE PRACTITIONER

## 2019-09-12 ASSESSMENT — PATIENT HEALTH QUESTIONNAIRE - PHQ9: CLINICAL INTERPRETATION OF PHQ2 SCORE: 0

## 2019-09-12 NOTE — PROGRESS NOTES
Subjective:     Sameer Boston is a 29 y.o. female who presents with hyperlipidemia.    HPI:   Seen in f/u for hyperlipidemia.  She is feeling well.  Reviewed lab with pt.  Her LP shows good trg and HDL.  LDL is up from 94 to 114.  Now always on a good diet.  Taking meds approp.  She is on lipitor.  Not exercising regularly.  Does exercise sometimes.  She is now due updated SPEP, immunoglobulins and light chains.  Dr Lacy recommended 1 yr f/u.       Patient Active Problem List    Diagnosis Date Noted   • CHF (congestive heart failure) ([facility])    • Pulmonary hypertension ([facility])    • Elbow fracture 01/23/2016   • Chronic anticoagulation 07/27/2015   • Hypercholesterolemia 07/27/2015   • Migraine headache with aura    • Paroxysmal atrial fibrillation ([facility]) 10/08/2012   • Pacemaker [facility] for 3 degree heart block 04/29/2011   • Tricuspid regurgitation s/p repair 04/29/2011   • Cerebral infarction ([facility]) 04/17/2011   • LA appendage ligation for thrombus 04/17/2011   • Mitral stenosis s/p Mechanical MVR 04/17/2011   • Anemia 04/17/2011       Current medicines (including changes today)  Current Outpatient Medications   Medication Sig Dispense Refill   • warfarin (COUMADIN) 5 MG Tab Take 0.5-1 Tabs by mouth every day. 90 Tab 0   • metoprolol SR (TOPROL XL) 25 MG TABLET SR 24 HR Take 1 Tab by mouth every bedtime. 90 Tab 3   • atorvastatin (LIPITOR) 40 MG Tab Take 1 Tab by mouth every day. 90 Tab 3   • acetaminophen (TYLENOL) 325 MG Tab Take 325-650 mg by mouth as needed (headache).     • therapeutic multivitamin-minerals (THERAGRAN-M) Tab Take 1 Tab by mouth every day. 30 Tab 0   • Cholecalciferol (VITAMIN D) 2000 UNITS Cap Take 1 Cap by mouth every day. 30 Cap 0     No current facility-administered medications for this visit.        Allergies   Allergen Reactions   • No Known Drug Allergy        ROS  Constitutional: Negative. Negative for fever, chills, weight loss, malaise/fatigue and diaphoresis.   HENT: Negative.  "Negative for hearing loss, ear pain, nosebleeds, congestion, sore throat, neck pain, tinnitus and ear discharge.   Respiratory: Negative. Negative for cough, hemoptysis, sputum production, shortness of breath, wheezing and stridor.   Cardiovascular: Negative. Negative for chest pain, palpitations, orthopnea, claudication, leg swelling and PND.   Gastrointestinal: Denies nausea, vomiting, diarrhea, constipation, heartburn, melena or hematochezia.  Genitourinary: Denies dysuria, hematuria, urinary incontinence, frequency or urgency.        Objective:     /60 (BP Location: Right arm, Patient Position: Sitting, BP Cuff Size: Adult)   Pulse 87   Temp 36.3 °C (97.3 °F) (Temporal)   Ht 1.575 m (5' 2\")   Wt 71.2 kg (157 lb)   SpO2 96%  Body mass index is 28.72 kg/m².    Physical Exam:  Vitals reviewed.  Constitutional: Oriented to person, place, and time. appears well-developed and well-nourished. No distress.   Cardiovascular: Normal rate, regular rhythm, normal heart sounds and intact distal pulses. Exam reveals no gallop and no friction rub. No murmur heard. No carotid bruits.   Pulmonary/Chest: Effort normal and breath sounds normal. No stridor. No respiratory distress. no wheezes or rales. exhibits no tenderness.   Musculoskeletal: Normal range of motion. exhibits no edema. rudolph pedal pulses 2+.  Lymphadenopathy: No cervical or supraclavicular adenopathy.   Neurological: Alert and oriented to person, place, and time. exhibits normal muscle tone.  Skin: Skin is warm and dry. No diaphoresis.   Psychiatric: Normal mood and affect. Behavior is normal.      Assessment and Plan:     The following treatment plan was discussed:    1. Abnormality of globulin  Comp Metabolic Panel    SERUM PROTEIN ELECTROPHORESIS    IMMUNOGLOBULINS A/G/M SERUM    FREE K&L LT CHAINS, QT, SERUM    due for recheck of lab.  will do SPEP, LIGHT CHAINS & IMMUNOGLOBULINS.  f/u with pt with results and yearly or sooner if lab abn   2. " Hyperlipidemia LDL goal <100      LDL up from last year.  enc improved diet and exercise.  f/u yearly.  call for lab slip         Followup: Return in about 1 year (around 9/12/2020).

## 2019-09-16 ENCOUNTER — OFFICE VISIT (OUTPATIENT)
Dept: URGENT CARE | Facility: CLINIC | Age: 29
End: 2019-09-16
Payer: COMMERCIAL

## 2019-09-16 VITALS
RESPIRATION RATE: 16 BRPM | HEART RATE: 82 BPM | TEMPERATURE: 98.7 F | SYSTOLIC BLOOD PRESSURE: 114 MMHG | WEIGHT: 157 LBS | OXYGEN SATURATION: 96 % | DIASTOLIC BLOOD PRESSURE: 78 MMHG | BODY MASS INDEX: 28.72 KG/M2

## 2019-09-16 DIAGNOSIS — H66.002 NON-RECURRENT ACUTE SUPPURATIVE OTITIS MEDIA OF LEFT EAR WITHOUT SPONTANEOUS RUPTURE OF TYMPANIC MEMBRANE: ICD-10-CM

## 2019-09-16 PROCEDURE — 99214 OFFICE O/P EST MOD 30 MIN: CPT | Performed by: NURSE PRACTITIONER

## 2019-09-16 RX ORDER — AMOXICILLIN 875 MG/1
875 TABLET, COATED ORAL 2 TIMES DAILY
Qty: 14 TAB | Refills: 0 | Status: SHIPPED | OUTPATIENT
Start: 2019-09-16 | End: 2019-09-23

## 2019-09-16 RX ORDER — OFLOXACIN 3 MG/ML
5 SOLUTION AURICULAR (OTIC) DAILY
Qty: 1 BOTTLE | Refills: 0 | Status: SHIPPED | OUTPATIENT
Start: 2019-09-16 | End: 2019-09-23

## 2019-09-16 ASSESSMENT — ENCOUNTER SYMPTOMS
CHILLS: 0
ABDOMINAL PAIN: 0
SORE THROAT: 0
PALPITATIONS: 0
DIARRHEA: 0
BLURRED VISION: 0
NECK PAIN: 0
EYE REDNESS: 0
DIZZINESS: 0
CONSTIPATION: 0
EYE DISCHARGE: 0
VOMITING: 0
NAUSEA: 0
HEADACHES: 0
FEVER: 0
MUSCULOSKELETAL NEGATIVE: 1
WHEEZING: 0
DOUBLE VISION: 0
COUGH: 0
STRIDOR: 0
EYE PAIN: 0

## 2019-09-16 NOTE — PROGRESS NOTES
Subjective:   Sameer Boston is a 29 y.o. female who presents for Otalgia ((L) ear x 4 days)        Otalgia    There is pain in the left ear. This is a new problem. The current episode started in the past 7 days. The problem occurs constantly. The problem has been unchanged. The pain is moderate. Associated symptoms include ear discharge and hearing loss. Pertinent negatives include no abdominal pain, coughing, diarrhea, headaches, neck pain, rash, sore throat or vomiting. She has tried nothing for the symptoms. The treatment provided no relief.        Review of Systems   Constitutional: Negative for chills and fever.   HENT: Positive for ear discharge, ear pain and hearing loss. Negative for congestion and sore throat.    Eyes: Negative for blurred vision, double vision, pain, discharge and redness.   Respiratory: Negative for cough, wheezing and stridor.    Cardiovascular: Negative for chest pain and palpitations.   Gastrointestinal: Negative for abdominal pain, constipation, diarrhea, nausea and vomiting.   Musculoskeletal: Negative.  Negative for neck pain.   Skin: Negative.  Negative for itching and rash.   Neurological: Negative for dizziness and headaches.   All other systems reviewed and are negative.    Patient's PMH, SocHx, SurgHx, FamHx, Drug allergies and medications reviewed.     Objective:   /78 (BP Location: Left arm, Patient Position: Sitting, BP Cuff Size: Adult)   Pulse 82   Temp 37.1 °C (98.7 °F) (Temporal)   Resp 16   Wt 71.2 kg (157 lb)   SpO2 96%   BMI 28.72 kg/m²   Physical Exam   Constitutional: She is oriented to person, place, and time. She appears well-developed and well-nourished. No distress.   HENT:   Head: Normocephalic.   Right Ear: Hearing, tympanic membrane and ear canal normal. Tympanic membrane is not erythematous. No middle ear effusion.   Left Ear: Hearing and ear canal normal. There is drainage. Tympanic membrane is erythematous and bulging. Tympanic membrane  is not perforated.  No middle ear effusion.   Nose: No rhinorrhea. Right sinus exhibits no maxillary sinus tenderness and no frontal sinus tenderness. Left sinus exhibits no maxillary sinus tenderness and no frontal sinus tenderness.   Mouth/Throat: Oropharynx is clear and moist and mucous membranes are normal.   Left ear canal with yellow discharge noted.   Eyes: Pupils are equal, round, and reactive to light. Conjunctivae, EOM and lids are normal.   Neck: Normal range of motion. No thyromegaly present.   Cardiovascular: Normal rate and regular rhythm.   Heart clicking   Pulmonary/Chest: Effort normal. No accessory muscle usage or stridor. No apnea, no tachypnea and no bradypnea. No respiratory distress. She has no decreased breath sounds. She has no wheezes.   Lymphadenopathy:        Head (right side): No submandibular, no tonsillar, no preauricular, no posterior auricular and no occipital adenopathy present.        Head (left side): Submandibular and preauricular adenopathy present. No tonsillar, no posterior auricular and no occipital adenopathy present.   Neurological: She is alert and oriented to person, place, and time.   Skin: Skin is warm and dry. No rash noted. She is not diaphoretic.   Psychiatric: She has a normal mood and affect. Her speech is normal and behavior is normal. Judgment and thought content normal.   Vitals reviewed.        Assessment/Plan:   Assessment    1. Non-recurrent acute suppurative otitis media of left ear without spontaneous rupture of tympanic membrane  - ofloxacin otic sol (FLOXIN OTIC) 0.3 % Solution; Place 5 Drops in left ear every day for 7 days.  Dispense: 1 Bottle; Refill: 0  - amoxicillin (AMOXIL) 875 MG tablet; Take 1 Tab by mouth 2 times a day for 7 days.  Dispense: 14 Tab; Refill: 0    Hx of heart valve replacement  Patient on Coumadin - advised to have INR checked on Thursday or Friday as this can increase the affects. States she already has [facility].    Differential diagnosis, natural history, supportive care, and indications for immediate follow-up discussed.     **Please note that all invasive procedures during this visit were performed by myself and/or the Medical Assistant under the supervision of the PA or MD in office**

## 2019-09-19 ENCOUNTER — HOSPITAL ENCOUNTER (OUTPATIENT)
Dept: LAB | Facility: MEDICAL CENTER | Age: 29
End: 2019-09-19
Attending: NURSE PRACTITIONER
Payer: COMMERCIAL

## 2019-09-19 DIAGNOSIS — Z79.01 CHRONIC ANTICOAGULATION: ICD-10-CM

## 2019-09-19 LAB
INR PPP: 2.28 (ref 0.87–1.13)
PROTHROMBIN TIME: 25.9 SEC (ref 12–14.6)

## 2019-09-19 PROCEDURE — 85610 PROTHROMBIN TIME: CPT

## 2019-09-19 PROCEDURE — 36415 COLL VENOUS BLD VENIPUNCTURE: CPT

## 2019-09-20 ENCOUNTER — ANTICOAGULATION MONITORING (OUTPATIENT)
Dept: VASCULAR LAB | Facility: MEDICAL CENTER | Age: 29
End: 2019-09-20

## 2019-09-20 ENCOUNTER — IMMUNIZATION (OUTPATIENT)
Dept: OCCUPATIONAL MEDICINE | Facility: CLINIC | Age: 29
End: 2019-09-20

## 2019-09-20 DIAGNOSIS — Z23 NEED FOR VACCINATION: ICD-10-CM

## 2019-09-20 DIAGNOSIS — I48.0 PAROXYSMAL ATRIAL FIBRILLATION (HCC): ICD-10-CM

## 2019-09-20 DIAGNOSIS — I05.0 MITRAL VALVE STENOSIS, UNSPECIFIED ETIOLOGY: ICD-10-CM

## 2019-09-20 PROCEDURE — 90686 IIV4 VACC NO PRSV 0.5 ML IM: CPT | Performed by: PREVENTIVE MEDICINE

## 2019-09-20 NOTE — PROGRESS NOTES
Anticoagulation Summary  As of 2019    INR goal:   2.5-3.5   TTR:   54.2 % (4.2 y)   INR used for dosin.28! (2019)   Warfarin maintenance plan:   5 mg (5 mg x 1) every Mon, Wed, Fri; 2.5 mg (5 mg x 0.5) all other days   Weekly warfarin total:   25 mg   Plan last modified:   Nina Membreno (2019)   Next INR check:   2019   Target end date:   Indefinite    Indications    Paroxysmal atrial fibrillation (HCC) [I48.0]  Mitral stenosis s/p Mechanical MVR [I05.0]             Anticoagulation Episode Summary     INR check location:   Outside Lab    Preferred lab:       Send INR reminders to:       Comments:   [facility]       Anticoagulation Care Providers     Provider Role Specialty Phone number    Bird Rodriguez D.O. Referring Family Medicine [phone]    Trey Dubon M.D. Referring Cardiac Surgery [phone]    Taj Allen, PharmD Responsible          Anticoagulation Patient Findings          Left voicemail message to report a sub therapeutic INR of 2.3.  Pt to bolus dose with 7.5mg tonight, then continue with current warfarin dosing regimen. Requested pt contact the clinic for any s/s of unusual bleeding, bruising, clotting or any changes to diet or medication.  Follow up in 1 weeks, to reduce the risk of adverse events related to this high risk medication, warfarin.    Nina Membreno, Clinical Pharmacist

## 2019-09-30 ENCOUNTER — HOSPITAL ENCOUNTER (OUTPATIENT)
Dept: LAB | Facility: MEDICAL CENTER | Age: 29
End: 2019-09-30
Attending: NURSE PRACTITIONER
Payer: COMMERCIAL

## 2019-09-30 DIAGNOSIS — Z79.01 CHRONIC ANTICOAGULATION: ICD-10-CM

## 2019-09-30 LAB
INR PPP: 2.55 (ref 0.87–1.13)
PROTHROMBIN TIME: 28.3 SEC (ref 12–14.6)

## 2019-09-30 PROCEDURE — 36415 COLL VENOUS BLD VENIPUNCTURE: CPT

## 2019-09-30 PROCEDURE — 85610 PROTHROMBIN TIME: CPT

## 2019-10-01 ENCOUNTER — ANTICOAGULATION MONITORING (OUTPATIENT)
Dept: VASCULAR LAB | Facility: MEDICAL CENTER | Age: 29
End: 2019-10-01

## 2019-10-01 DIAGNOSIS — I48.0 PAROXYSMAL ATRIAL FIBRILLATION (HCC): ICD-10-CM

## 2019-10-01 DIAGNOSIS — I05.0 MITRAL VALVE STENOSIS, UNSPECIFIED ETIOLOGY: ICD-10-CM

## 2019-10-01 NOTE — PROGRESS NOTES
Anticoagulation Summary  As of 10/1/2019    INR goal:   2.5-3.5   TTR:   54.0 % (4.3 y)   INR used for dosin.55 (2019)   Warfarin maintenance plan:   5 mg (5 mg x 1) every Mon, Wed, Fri; 2.5 mg (5 mg x 0.5) all other days   Weekly warfarin total:   25 mg   Plan last modified:   Nina CASSANDRA Filter (2019)   Next INR check:   10/15/2019   Target end date:   Indefinite    Indications    Paroxysmal atrial fibrillation (HCC) [I48.0]  Mitral stenosis s/p Mechanical MVR [I05.0]             Anticoagulation Episode Summary     INR check location:   Outside Lab    Preferred lab:       Send INR reminders to:       Comments:   [facility]       Anticoagulation Care Providers     Provider Role Specialty Phone number    Bird Rodriguez D.O. Referring Family Medicine [phone]    Trey Dubon M.D. Referring Cardiac Surgery [phone]    Taj Allen, PharmD Responsible          Anticoagulation Patient Findings      Spoke with patient  to report a therapeutic INR.    Pt instructed to continue with current warfarin dosing regimen, confirms dosing.   Pt denies any s/s of bleeding, bruising, clotting or any changes to diet or medication.    Will follow up in 2 week(s).     Toan Rodríguez, Pharmacy Intern

## 2019-10-31 DIAGNOSIS — I48.91 ATRIAL FIBRILLATION, UNSPECIFIED TYPE (HCC): ICD-10-CM

## 2019-11-05 ENCOUNTER — HOSPITAL ENCOUNTER (OUTPATIENT)
Dept: LAB | Facility: MEDICAL CENTER | Age: 29
End: 2019-11-05
Attending: NURSE PRACTITIONER
Payer: COMMERCIAL

## 2019-11-05 DIAGNOSIS — Z79.01 CHRONIC ANTICOAGULATION: ICD-10-CM

## 2019-11-05 LAB
INR PPP: 2.58 (ref 0.87–1.13)
PROTHROMBIN TIME: 28.6 SEC (ref 12–14.6)

## 2019-11-05 PROCEDURE — 85610 PROTHROMBIN TIME: CPT

## 2019-11-05 PROCEDURE — 36415 COLL VENOUS BLD VENIPUNCTURE: CPT

## 2019-11-05 NOTE — TELEPHONE ENCOUNTER
[facility]    Pt overdue for next INR.  Unable to leave VM to follow up with warfarin refill request.    Taj Allen, AgataD

## 2019-11-06 ENCOUNTER — NON-PROVIDER VISIT (OUTPATIENT)
Dept: CARDIOLOGY | Facility: MEDICAL CENTER | Age: 29
End: 2019-11-06
Payer: COMMERCIAL

## 2019-11-06 ENCOUNTER — ANTICOAGULATION MONITORING (OUTPATIENT)
Dept: VASCULAR LAB | Facility: MEDICAL CENTER | Age: 29
End: 2019-11-06

## 2019-11-06 DIAGNOSIS — I48.0 PAROXYSMAL ATRIAL FIBRILLATION (HCC): ICD-10-CM

## 2019-11-06 DIAGNOSIS — I05.0 MITRAL VALVE STENOSIS, UNSPECIFIED ETIOLOGY: ICD-10-CM

## 2019-11-06 DIAGNOSIS — Z95.0 PACEMAKER: ICD-10-CM

## 2019-11-06 PROCEDURE — 93280 PM DEVICE PROGR EVAL DUAL: CPT | Performed by: INTERNAL MEDICINE

## 2019-11-06 RX ORDER — WARFARIN SODIUM 5 MG/1
TABLET ORAL
Qty: 90 TAB | Refills: 0 | Status: SHIPPED | OUTPATIENT
Start: 2019-11-06 | End: 2020-03-11

## 2019-11-06 NOTE — PROGRESS NOTES
Anticoagulation Summary  As of 2019    INR goal:   2.5-3.5   TTR:   55.0 % (4.4 y)   INR used for dosin.58 (2019)   Warfarin maintenance plan:   5 mg (5 mg x 1) every Mon, Wed, Fri; 2.5 mg (5 mg x 0.5) all other days   Weekly warfarin total:   25 mg   Plan last modified:   Hedy Key PharmD (2019)   Next INR check:   2019   Target end date:   Indefinite    Indications    Paroxysmal atrial fibrillation (HCC) [I48.0]  Mitral stenosis s/p Mechanical MVR [I05.0]             Anticoagulation Episode Summary     INR check location:   Outside Lab    Preferred lab:       Send INR reminders to:       Comments:   [facility]       Anticoagulation Care Providers     Provider Role Specialty Phone number    Bird Rodriguez D.O. Referring Family Medicine [phone]    Trey Dubon M.D. Referring Cardiac Surgery [phone]    Agata SantosD Responsible          Anticoagulation Patient Findings      Spoke with patient to report a therapeutic INR.    Pt instructed to continue with current warfarin dosing regimen, confirms dosing.   Pt denies any s/s of bleeding, bruising, clotting or any changes to diet or medication.    Will follow up in 4 week(s).     Hedy Key, [place]

## 2019-12-09 ENCOUNTER — HOSPITAL ENCOUNTER (OUTPATIENT)
Dept: LAB | Facility: MEDICAL CENTER | Age: 29
End: 2019-12-09
Attending: NURSE PRACTITIONER
Payer: COMMERCIAL

## 2019-12-09 DIAGNOSIS — Z79.01 CHRONIC ANTICOAGULATION: ICD-10-CM

## 2019-12-09 LAB
INR PPP: 2.99 (ref 0.87–1.13)
PROTHROMBIN TIME: 32.2 SEC (ref 12–14.6)

## 2019-12-09 PROCEDURE — 36415 COLL VENOUS BLD VENIPUNCTURE: CPT

## 2019-12-09 PROCEDURE — 85610 PROTHROMBIN TIME: CPT

## 2019-12-10 ENCOUNTER — ANTICOAGULATION MONITORING (OUTPATIENT)
Dept: VASCULAR LAB | Facility: MEDICAL CENTER | Age: 29
End: 2019-12-10

## 2019-12-10 DIAGNOSIS — I05.0 MITRAL VALVE STENOSIS, UNSPECIFIED ETIOLOGY: ICD-10-CM

## 2019-12-10 DIAGNOSIS — I48.0 PAROXYSMAL ATRIAL FIBRILLATION (HCC): ICD-10-CM

## 2019-12-10 NOTE — PROGRESS NOTES
Anticoagulation Summary  As of 12/10/2019    INR goal:   2.5-3.5   TTR:   56.0 % (4.5 y)   INR used for dosin.99 (2019)   Warfarin maintenance plan:   5 mg (5 mg x 1) every Mon, Wed, Fri; 2.5 mg (5 mg x 0.5) all other days   Weekly warfarin total:   25 mg   No change documented:   Farrukh Nunes, Med Ass't   Plan last modified:   Hedy Key, [place] (2019)   Next INR check:   2020   Target end date:   Indefinite    Indications    Paroxysmal atrial fibrillation (HCC) [I48.0]  Mitral stenosis s/p Mechanical MVR [I05.0]             Anticoagulation Episode Summary     INR check location:   Outside Lab    Preferred lab:       Send INR reminders to:       Comments:   [facility]       Anticoagulation Care Providers     Provider Role Specialty Phone number    Bird Rodriguez D.O. Referring Family Medicine [phone]    Trey Dubon M.D. Referring Cardiac Surgery [phone]    Taj Allen, PharmD Responsible          Anticoagulation Patient Findings  Patient Findings     Negatives:   Signs/symptoms of thrombosis, Signs/symptoms of bleeding, Laboratory test error suspected, Change in health, Change in alcohol use, Change in activity, Upcoming invasive procedure, Emergency department visit, Upcoming dental procedure, Missed doses, Extra doses, Change in medications, Change in diet/appetite, Hospital admission, Bruising, Other complaints        Left voicemail message to report therapeutic INR of 2.9.  Patient to continue with current warfarin dosing regimen. Requested pt contact the clinic for any change to diet or medication, and to report any signs or symptoms of bleeding, bruising or clotting.  Pt to follow up in 4 weeks.    Farrukh MEZA'Rayruddy, Med Ass't       I have reviewed and concur with the above plan     Tan Helton, PharmD

## 2020-01-14 ENCOUNTER — OFFICE VISIT (OUTPATIENT)
Dept: URGENT CARE | Facility: CLINIC | Age: 30
End: 2020-01-14
Payer: COMMERCIAL

## 2020-01-14 VITALS
TEMPERATURE: 98 F | OXYGEN SATURATION: 96 % | SYSTOLIC BLOOD PRESSURE: 112 MMHG | HEART RATE: 87 BPM | WEIGHT: 166 LBS | DIASTOLIC BLOOD PRESSURE: 82 MMHG | HEIGHT: 62 IN | BODY MASS INDEX: 30.55 KG/M2 | RESPIRATION RATE: 16 BRPM

## 2020-01-14 DIAGNOSIS — H66.92 ACUTE LEFT OTITIS MEDIA: ICD-10-CM

## 2020-01-14 PROCEDURE — 99214 OFFICE O/P EST MOD 30 MIN: CPT | Performed by: FAMILY MEDICINE

## 2020-01-14 RX ORDER — AMOXICILLIN 875 MG/1
TABLET, COATED ORAL
Qty: 20 TAB | Refills: 0 | Status: SHIPPED
Start: 2020-01-14 | End: 2020-03-06

## 2020-01-15 NOTE — PROGRESS NOTES
Chief Complaint:    Chief Complaint   Patient presents with   • Otalgia     x 1 week throbbing. Yellow discharge (L)        History of Present Illness:    This is a new problem. For 10 days, she has left ear pain, overall at least moderate severity, and not getting better. She had nasal symptoms prior to onset of left ear pain, but nasal symptoms have improved. She had similar problem 9/16/19 and was rx'd Amoxil and Floxin Otic. She improved completely, but she stopped the pills before finishing them because she felt better. She reports prior to these episodes, she did not get ear infections.      Review of Systems:    Constitutional: Negative for fever, chills, and diaphoresis.   Eyes: Negative for change in vision, photophobia, pain, redness, and discharge.  ENT: See HPI.  Respiratory: Negative for cough, hemoptysis, sputum production, shortness of breath, wheezing, and stridor.    Cardiovascular: Negative for chest pain, palpitations, orthopnea, claudication, leg swelling, and PND.   Gastrointestinal: Negative for abdominal pain, nausea, vomiting, diarrhea, constipation, blood in stool, and melena.   Genitourinary: Negative for dysuria, urinary urgency, urinary frequency, hematuria, and flank pain.   Musculoskeletal: Negative for myalgias, joint pain, neck pain, and back pain.   Skin: Negative for rash and itching.   Neurological: Negative for dizziness, tingling, tremors, sensory change, speech change, focal weakness, seizures, loss of consciousness, and headaches.   Endo: Negative for polydipsia.   Heme: On Warfarin.  Psychiatric/Behavioral: Negative for depression, suicidal ideas, hallucinations, memory loss and substance abuse. The patient is not nervous/anxious and does not have insomnia.        Past Medical History:    Past Medical History:   Diagnosis Date   • Anemia 4/17/2011   • CHF (congestive heart failure) (HCC)    • Chronic anticoagulation    • CVA (cerebral infarction) 2011   • Elbow fracture    •  History of atrial fibrillation    • Hyperlipidemia    • Left atrial thrombus    • Migraine     occasional   • Mitral stenosis     s/p mechanical MVR on coumadin   • Pacemaker     [place] for 3rd degree AVB   • Pulmonary hypertension (HCC)    • Third degree heart block (HCC)    • Tricuspid regurgitation     s/p repair     Past Surgical History:    Past Surgical History:   Procedure Laterality Date   • ELBOW ORIF Right 1/26/2016    Procedure: ELBOW ORIF olecranon;  Surgeon: Fausto Nunez M.D.;  Location: SURGERY [facility];  Service:    • PELVISCOPY  9/6/2011    Performed by SHERRY HEREDIA at SURGERY SAME DAY [facility] ORS   • INTRA UTERINE DEVICE REMOVAL  9/6/2011    Performed by SHERRY HEREDIA at SURGERY SAME DAY [facility] ORS   • TUBAL COAGULATION LAPAROSCOPIC BILATERAL  9/6/2011    Performed by SHERRY HEREDIA at SURGERY SAME DAY [facility] ORS   • MITRAL VALVE REPLACE  4/25/2011    Performed by RALPH MCNEIL at SURGERY [facility] ORS   • TRICUSPID VALVE REPAIR  4/25/2011    Performed by RALPH MCNEIL at SURGERY [facility] ORS   • MASS EXCISION GENERAL  4/25/2011    Performed by RALPH MCNEIL at SURGERY [facility] ORS   • PRIMARY C SECTION  4/6/2011    Performed by DEBBIE MAXWELL at LABOR AND DELIVERY   • PACEMAKER INSERTION  2011     Social History:    Social History     Socioeconomic History   • Marital status: Single     Spouse name: Not on file   • Number of children: Not on file   • Years of education: Not on file   • Highest education level: Not on file   Occupational History   • Not on file   Social Needs   • Financial resource strain: Not on file   • Food insecurity:     Worry: Not on file     Inability: Not on file   • Transportation needs:     Medical: Not on file     Non-medical: Not on file   Tobacco Use   • Smoking status: Never Smoker   • Smokeless tobacco: Never Used   Substance and Sexual Activity   • Alcohol use: No     Alcohol/week: 0.0 oz   • Drug use: No   • Sexual  "activity: Not Currently     Partners: Male   Lifestyle   • Physical activity:     Days per week: Not on file     Minutes per session: Not on file   • Stress: Not on file   Relationships   • Social connections:     Talks on phone: Not on file     Gets together: Not on file     Attends Mu-ism service: Not on file     Active member of club or organization: Not on file     Attends meetings of clubs or organizations: Not on file     Relationship status: Not on file   • Intimate partner violence:     Fear of current or ex partner: Not on file     Emotionally abused: Not on file     Physically abused: Not on file     Forced sexual activity: Not on file   Other Topics Concern   • Not on file   Social History Narrative   • Not on file     Family History:    Family History   Problem Relation Age of Onset   • Diabetes Maternal Grandmother         IDDM   • Hypertension Maternal Grandmother         meds   • Heart Disease Maternal Grandfather    • Diabetes Paternal Grandmother         esrd     Medications:    Current Outpatient Medications on File Prior to Visit   Medication Sig Dispense Refill   • warfarin (COUMADIN) 5 MG Tab Take 0.5 to 1 tab po qday as directed by the [facility] anticoagulation clinic 90 Tab 0   • metoprolol SR (TOPROL XL) 25 MG TABLET SR 24 HR Take 1 Tab by mouth every bedtime. 90 Tab 3   • atorvastatin (LIPITOR) 40 MG Tab Take 1 Tab by mouth every day. 90 Tab 3     No current facility-administered medications on file prior to visit.      Allergies:    Allergies   Allergen Reactions   • No Known Drug Allergy        Vitals:    Vitals:    01/14/20 1608   BP: 112/82   Pulse: 87   Resp: 16   Temp: 36.7 °C (98 °F)   SpO2: 96%   Weight: 75.3 kg (166 lb)   Height: 1.575 m (5' 2\")       Physical Exam:    Constitutional: Vital signs reviewed. Appears well-developed and well-nourished. No acute distress.   Eyes: Sclera white, conjunctivae clear.   ENT: Left TM is moderately erythematous, cloudy, and with mild fluid " behind TM. External ears normal. External auditory canals normal without discharge. Right TM translucent and non-bulging. Hearing normal. Nasal mucosa pink. Lips are normal. Oral mucosa pink and moist. Posterior pharynx: WNL.  Neck: Neck supple.   Pulmonary/Chest: Respirations non-labored.   Lymph: Cervical nodes without tenderness or enlargement.  Musculoskeletal: Normal gait. Normal range of motion. No muscular atrophy or weakness.  Neurological: Alert and oriented to person, place, and time. Muscle tone normal. Coordination normal.   Skin: No rashes or lesions. Warm, dry, normal turgor.  Psychiatric: Normal mood and affect. Behavior is normal. Judgment and thought content normal.       Assessment / Plan:    1. Acute left otitis media  - amoxicillin (AMOXIL) 875 MG tablet; 1 TAB BY MOUTH TWICE A DAY X 7-10 DAYS.  Dispense: 20 Tab; Refill: 0      Discussed with her DDX, management options, and risks, benefits, and alternatives to treatment plan agreed upon.    Agreeable to medication prescribed.    Advised to check INR in 2-3 days and after finished with antibiotic. She says she will be able to do this.    Discussed expected course of duration, time for improvement, and to seek follow-up in Emergency Room, urgent care, or with PCP if getting worse at any time or not improving within expected time frame.

## 2020-01-17 ENCOUNTER — HOSPITAL ENCOUNTER (OUTPATIENT)
Dept: LAB | Facility: MEDICAL CENTER | Age: 30
End: 2020-01-17
Attending: NURSE PRACTITIONER
Payer: COMMERCIAL

## 2020-01-17 ENCOUNTER — ANTICOAGULATION MONITORING (OUTPATIENT)
Dept: VASCULAR LAB | Facility: MEDICAL CENTER | Age: 30
End: 2020-01-17

## 2020-01-17 DIAGNOSIS — Z79.01 CHRONIC ANTICOAGULATION: ICD-10-CM

## 2020-01-17 DIAGNOSIS — I48.0 PAROXYSMAL ATRIAL FIBRILLATION (HCC): ICD-10-CM

## 2020-01-17 LAB
INR PPP: 3 (ref 0.87–1.13)
PROTHROMBIN TIME: 32.2 SEC (ref 12–14.6)

## 2020-01-17 PROCEDURE — 85610 PROTHROMBIN TIME: CPT

## 2020-01-17 PROCEDURE — 36415 COLL VENOUS BLD VENIPUNCTURE: CPT

## 2020-01-17 NOTE — PROGRESS NOTES
Anticoagulation Summary  As of 1/17/2020    INR goal:   2.5-3.5   TTR:   57.0 % (4.6 y)   INR used for dosing:   3.00 (1/17/2020)   Warfarin maintenance plan:   5 mg (5 mg x 1) every Mon, Wed, Fri; 2.5 mg (5 mg x 0.5) all other days   Weekly warfarin total:   25 mg   Plan last modified:   Hedy Key PharmD (11/6/2019)   Next INR check:   2/28/2020   Target end date:   Indefinite    Indications    Paroxysmal atrial fibrillation (HCC) [I48.0]  Mitral stenosis s/p Mechanical MVR [I05.0]             Anticoagulation Episode Summary     INR check location:   Outside Lab    Preferred lab:       Send INR reminders to:       Comments:   [facility]       Anticoagulation Care Providers     Provider Role Specialty Phone number    Bird Rodriguez D.O. Referring Family Medicine [phone]    Trey Dubon M.D. Referring Cardiac Surgery [phone]    Taj Allen, PharmD Responsible          Anticoagulation Patient Findings          HPI:  Sameer Boston, on anticoagulation therapy with warfarin for PAF.   Changes to current medical/health status since last appt: none  Denies signs/symptoms of bleeding and/or thrombosis since the last appt.    Denies any interval changes to diet  Denies any interval changes to medications since last appt.   Denies any complications or cost restrictions with current therapy.     A/P   INR  therapeutic.   Pt is to continue with current warfarin dosing regimen.     Next INR in 6 week(s).    Taj Allen, AgataD

## 2020-03-03 ENCOUNTER — HOSPITAL ENCOUNTER (OUTPATIENT)
Dept: LAB | Facility: MEDICAL CENTER | Age: 30
End: 2020-03-03
Attending: NURSE PRACTITIONER
Payer: COMMERCIAL

## 2020-03-03 DIAGNOSIS — Z79.01 CHRONIC ANTICOAGULATION: ICD-10-CM

## 2020-03-03 LAB
INR PPP: 3.01 (ref 0.87–1.13)
PROTHROMBIN TIME: 32.4 SEC (ref 12–14.6)

## 2020-03-03 PROCEDURE — 85610 PROTHROMBIN TIME: CPT

## 2020-03-03 PROCEDURE — 36415 COLL VENOUS BLD VENIPUNCTURE: CPT

## 2020-03-04 ENCOUNTER — ANTICOAGULATION MONITORING (OUTPATIENT)
Dept: VASCULAR LAB | Facility: MEDICAL CENTER | Age: 30
End: 2020-03-04

## 2020-03-04 DIAGNOSIS — I48.0 PAROXYSMAL ATRIAL FIBRILLATION (HCC): ICD-10-CM

## 2020-03-04 NOTE — PROGRESS NOTES
Anticoagulation Summary  As of 3/4/2020    INR goal:   2.5-3.5   TTR:   58.2 % (4.7 y)   INR used for dosing:   3.01 (3/3/2020)   Warfarin maintenance plan:   5 mg (5 mg x 1) every Mon, Wed, Fri; 2.5 mg (5 mg x 0.5) all other days   Weekly warfarin total:   25 mg   No change documented:   Farrukh MEZA'Anna, Med Ass't   Plan last modified:   Hedy Key, PharmD (11/6/2019)   Next INR check:   4/14/2020   Target end date:   Indefinite    Indications    Paroxysmal atrial fibrillation (HCC) [I48.0]  Mitral stenosis s/p Mechanical MVR [I05.0]             Anticoagulation Episode Summary     INR check location:   Outside Lab    Preferred lab:       Send INR reminders to:       Comments:   [facility]       Anticoagulation Care Providers     Provider Role Specialty Phone number    Bird Rodriguez D.O. Referring Family Medicine [phone]    Trey Dubon M.D. Referring Cardiac Surgery [phone]    Taj Allen, PharmD Responsible          Anticoagulation Patient Findings  Patient Findings     Negatives:   Signs/symptoms of thrombosis, Signs/symptoms of bleeding, Laboratory test error suspected, Change in health, Change in alcohol use, Change in activity, Upcoming invasive procedure, Emergency department visit, Upcoming dental procedure, Missed doses, Extra doses, Change in medications, Change in diet/appetite, Hospital admission, Bruising, Other complaints        Left voicemail message to report therapeutic INR of 3.0.  Patient to continue with current warfarin dosing regimen. Requested pt contact the clinic for any change to diet or medication, and to report any signs or symptoms of bleeding, bruising or clotting.  Pt to follow up in 6 weeks.    Farrukh MEZA'Rayruddy, Med Ass't     I have reviewed and concur with the above plan on 03.04.2020.  Nina Membreno, Clinical Pharmacist, CDE, CACP

## 2020-03-06 ENCOUNTER — OFFICE VISIT (OUTPATIENT)
Dept: MEDICAL GROUP | Facility: MEDICAL CENTER | Age: 30
End: 2020-03-06
Payer: COMMERCIAL

## 2020-03-06 VITALS
HEART RATE: 83 BPM | WEIGHT: 162 LBS | OXYGEN SATURATION: 99 % | SYSTOLIC BLOOD PRESSURE: 116 MMHG | DIASTOLIC BLOOD PRESSURE: 66 MMHG | BODY MASS INDEX: 29.81 KG/M2 | TEMPERATURE: 98.6 F | HEIGHT: 62 IN

## 2020-03-06 DIAGNOSIS — H66.002 NON-RECURRENT ACUTE SUPPURATIVE OTITIS MEDIA OF LEFT EAR WITHOUT SPONTANEOUS RUPTURE OF TYMPANIC MEMBRANE: ICD-10-CM

## 2020-03-06 PROCEDURE — 99214 OFFICE O/P EST MOD 30 MIN: CPT | Performed by: FAMILY MEDICINE

## 2020-03-06 RX ORDER — AMOXICILLIN AND CLAVULANATE POTASSIUM 875; 125 MG/1; MG/1
1 TABLET, FILM COATED ORAL 2 TIMES DAILY
Qty: 20 TAB | Refills: 0 | Status: SHIPPED | OUTPATIENT
Start: 2020-03-06 | End: 2020-03-16

## 2020-03-06 ASSESSMENT — FIBROSIS 4 INDEX: FIB4 SCORE: 0.76

## 2020-03-06 ASSESSMENT — PATIENT HEALTH QUESTIONNAIRE - PHQ9: CLINICAL INTERPRETATION OF PHQ2 SCORE: 0

## 2020-03-06 NOTE — PROGRESS NOTES
"Subjective:   Sameer Boston is a 29 y.o. female here today for ear pain    Left ear pain/congestion for the last 10 days, with white/yellow drainage out of left ear. Does not feel like she needs to pop her ear. Had an ear infection 6 months ago, first time that she can remember, she feels like symptoms have never fully gone away.  There is associated sinus congestion and mild cough.  Uses peppermint tea and tylenol PM, to help her sleep. Uses hot compresses on right, which does not help.    Current medicines (including changes today)  Current Outpatient Medications   Medication Sig Dispense Refill   • amoxicillin-clavulanate (AUGMENTIN) 875-125 MG Tab Take 1 Tab by mouth 2 times a day for 10 days. 20 Tab 0   • warfarin (COUMADIN) 5 MG Tab Take 0.5 to 1 tab po qday as directed by the [facility] anticoagulation clinic 90 Tab 0   • metoprolol SR (TOPROL XL) 25 MG TABLET SR 24 HR Take 1 Tab by mouth every bedtime. 90 Tab 3   • atorvastatin (LIPITOR) 40 MG Tab Take 1 Tab by mouth every day. 90 Tab 3     No current facility-administered medications for this visit.      She  has a past medical history of Anemia (4/17/2011), CHF (congestive heart failure) (HCC), Chronic anticoagulation, CVA (cerebral infarction) (2011), Elbow fracture, History of atrial fibrillation, Hyperlipidemia, Left atrial thrombus, Migraine, Mitral stenosis, Pacemaker, Pulmonary hypertension (HCC), Third degree heart block (HCC), and Tricuspid regurgitation. She also has no past medical history of Encounter for long-term (current) use of other medications.    ROS   No fever, mild sore throat.       Objective:     /66 (BP Location: Right arm, Patient Position: Sitting)   Pulse 83   Temp 37 °C (98.6 °F)   Ht 1.575 m (5' 2\")   Wt 73.5 kg (162 lb)   SpO2 99%  Body mass index is 29.63 kg/m².   Physical Exam:  Constitutional: Alert, no distress.  Skin: Warm, dry, good turgor, no rashes in visible areas.  Eye: Equal, round and reactive, " conjunctiva clear, lids normal.  ENMT: Lips without lesions, good dentition, oropharynx clear. Left TM with purulence and 2 small cystic formation on TM. Right TM pearly gray.  Neck: Trachea midline, no masses, no thyromegaly. No cervical or supraclavicular lymphadenopathy  Respiratory: Unlabored respiratory effort, lungs clear to auscultation, no wheezes, no ronchi.  Cardiovascular: Normal S1, S2, no murmur, no edema.  Psych: Alert and oriented x3, normal affect and mood.        Assessment and Plan:   The following treatment plan was discussed    1. Non-recurrent acute suppurative otitis media of left ear without spontaneous rupture of tympanic membrane  New problem. Start Augmentin. If no improvement consider ENT referral.  - amoxicillin-clavulanate (AUGMENTIN) 875-125 MG Tab; Take 1 Tab by mouth 2 times a day for 10 days.  Dispense: 20 Tab; Refill: 0      Followup: Return if symptoms worsen or fail to improve.

## 2020-04-02 ENCOUNTER — OFFICE VISIT (OUTPATIENT)
Dept: MEDICAL GROUP | Facility: MEDICAL CENTER | Age: 30
End: 2020-04-02
Payer: COMMERCIAL

## 2020-04-02 VITALS
HEART RATE: 95 BPM | DIASTOLIC BLOOD PRESSURE: 80 MMHG | OXYGEN SATURATION: 98 % | TEMPERATURE: 97.6 F | BODY MASS INDEX: 30.55 KG/M2 | WEIGHT: 166 LBS | SYSTOLIC BLOOD PRESSURE: 100 MMHG | HEIGHT: 62 IN

## 2020-04-02 DIAGNOSIS — H66.92 ACUTE BACTERIAL OTITIS MEDIA, LEFT: ICD-10-CM

## 2020-04-02 DIAGNOSIS — E66.9 OBESITY (BMI 30-39.9): ICD-10-CM

## 2020-04-02 DIAGNOSIS — E78.5 HYPERLIPIDEMIA LDL GOAL <100: ICD-10-CM

## 2020-04-02 DIAGNOSIS — I10 ESSENTIAL HYPERTENSION: ICD-10-CM

## 2020-04-02 DIAGNOSIS — R77.1 ABNORMALITY OF GLOBULIN: ICD-10-CM

## 2020-04-02 DIAGNOSIS — H69.92 EUSTACHIAN TUBE DYSFUNCTION, LEFT: ICD-10-CM

## 2020-04-02 PROCEDURE — 99214 OFFICE O/P EST MOD 30 MIN: CPT | Performed by: NURSE PRACTITIONER

## 2020-04-02 RX ORDER — ATORVASTATIN CALCIUM 40 MG/1
40 TABLET, FILM COATED ORAL DAILY
Qty: 90 TAB | Refills: 0 | Status: SHIPPED | OUTPATIENT
Start: 2020-04-02 | End: 2020-05-05

## 2020-04-02 RX ORDER — METOPROLOL SUCCINATE 25 MG/1
25 TABLET, EXTENDED RELEASE ORAL
Qty: 90 TAB | Refills: 3 | Status: SHIPPED
Start: 2020-04-02 | End: 2020-04-02

## 2020-04-02 RX ORDER — ATORVASTATIN CALCIUM 40 MG/1
40 TABLET, FILM COATED ORAL DAILY
Qty: 90 TAB | Refills: 3 | Status: SHIPPED
Start: 2020-04-02 | End: 2020-04-02

## 2020-04-02 RX ORDER — AMOXICILLIN AND CLAVULANATE POTASSIUM 875; 125 MG/1; MG/1
1 TABLET, FILM COATED ORAL 2 TIMES DAILY
Qty: 20 TAB | Refills: 0 | Status: SHIPPED | OUTPATIENT
Start: 2020-04-02 | End: 2020-07-01

## 2020-04-02 RX ORDER — METOPROLOL SUCCINATE 25 MG/1
25 TABLET, EXTENDED RELEASE ORAL
Qty: 90 TAB | Refills: 0 | Status: SHIPPED | OUTPATIENT
Start: 2020-04-02 | End: 2020-05-05

## 2020-04-02 ASSESSMENT — FIBROSIS 4 INDEX: FIB4 SCORE: 0.76

## 2020-04-02 NOTE — PROGRESS NOTES
Subjective:     Sameer Boston is a 29 y.o. female who presents with left ear pressure.    HPI:   Seen in f/u for left ear pressure.   Sx started initially last week.  She went to  and given antibx.  She took all the med and sx resolved.  Then sx restarted again on monday.  No nasal secretions.  No sinus pressure or headache.  No sore throat or cough.    She is due updated lab.  She is on lipitor for her chol.  Stable on meds.  She is on metoprolol for valve issues and bp.  She is stable with cardiac.  Bp is well controlled on meds.    She last had CMP that showed elevated globulin.  That was in 2018.  She has not had updated CMP since.      Patient Active Problem List    Diagnosis Date Noted   • Obesity (BMI 30-39.9) 04/02/2020   • CHF (congestive heart failure) ([facility])    • Pulmonary hypertension ([facility])    • Elbow fracture 01/23/2016   • Chronic anticoagulation 07/27/2015   • Hypercholesterolemia 07/27/2015   • Migraine headache with aura    • Paroxysmal atrial fibrillation ([facility]) 10/08/2012   • Pacemaker [facility] for 3 degree heart block 04/29/2011   • Tricuspid regurgitation s/p repair 04/29/2011   • Cerebral infarction ([facility]) 04/17/2011   • LA appendage ligation for thrombus 04/17/2011   • Mitral stenosis s/p Mechanical MVR 04/17/2011   • Anemia 04/17/2011       Current medicines (including changes today)  Current Outpatient Medications   Medication Sig Dispense Refill   • amoxicillin-clavulanate (AUGMENTIN) 875-125 MG Tab Take 1 Tab by mouth 2 times a day. 20 Tab 0   • metoprolol SR (TOPROL XL) 25 MG TABLET SR 24 HR Take 1 Tab by mouth every bedtime. 90 Tab 0   • atorvastatin (LIPITOR) 40 MG Tab Take 1 Tab by mouth every day. 90 Tab 0   • warfarin (COUMADIN) 5 MG Tab TAKE ONE-HALF TO ONE TAB DAILY AS DIRECTED BY  THE  [facility]  ANTICOAGULATION  CLINIC 90 Tab 1     No current facility-administered medications for this visit.        Allergies   Allergen Reactions   • No Known Drug Allergy   "      ROS  Constitutional: Negative. Negative for fever, chills, weight loss, malaise/fatigue and diaphoresis.   HENT: Negative. Negative for hearing loss,  nosebleeds, congestion, sore throat, neck pain, tinnitus and ear discharge.   Respiratory: Negative. Negative for cough, hemoptysis, sputum production, shortness of breath, wheezing and stridor.   Cardiovascular: Negative. Negative for chest pain, palpitations, orthopnea, claudication, leg swelling and PND.   Gastrointestinal: Denies nausea, vomiting, diarrhea, constipation, heartburn, melena or hematochezia.  Genitourinary: Denies dysuria, hematuria, urinary incontinence, frequency or urgency.        Objective:     /80 (BP Location: Right arm, Patient Position: Sitting)   Pulse 95   Temp 36.4 °C (97.6 °F) (Temporal)   Ht 1.575 m (5' 2\")   Wt 75.3 kg (166 lb)   SpO2 98%  Body mass index is 30.36 kg/m².    Physical Exam:  Physical Exam   Vitals reviewed.  Constitutional: oriented to person, place, and time. appears well-developed and well-nourished. No distress.   HENT:  Head: Normocephalic and atraumatic. Right Ear: External ear normal. Left Ear: External ear normal. Nose: Nose normal. Mouth/Throat: Oropharynx is clear and moist. No oropharyngeal exudate.  rt tm wnl.  Left tm erythematous.  Eyes: Right eye exhibits no discharge. Left eye exhibits no discharge. No scleral icterus.  Neck: No JVD present.  Cardiovascular: Normal rate, regular rhythm, normal heart sounds and intact distal pulses.  Exam reveals no gallop and no friction rub.  No murmur heard.  No carotid bruits.   Pulmonary/Chest: Effort normal and breath sounds normal. No stridor. No respiratory distress. no wheezes or rales. exhibits no tenderness.   Musculoskeletal: Normal range of motion.  Lymphadenopathy: no cervical or supraclavicular adenopathy.   Neurological: alert and oriented to person, place, and time. exhibits normal muscle tone. Coordination normal.   Skin: Skin is warm and " dry. no diaphoresis.   Psychiatric: normal mood and affect. behavior is normal.        Assessment and Plan:     The following treatment plan was discussed:    1. Acute bacterial otitis media, left  amoxicillin-clavulanate (AUGMENTIN) 875-125 MG Tab    augmentin x 10 days.  f/u if sx not improved with tx.   2. Eustachian tube dysfunction, left  amoxicillin-clavulanate (AUGMENTIN) 875-125 MG Tab    zyrtec and flonase daily.     3. Abnormality of globulin  SERUM PROTEIN ELECTROPHORESIS    IMMUNOGLOBULINS A/G/M SERUM    FREE K&L LT CHAINS, QT, SERUM    PROTEIN ELECTROPHORESIS, UR    last CMP in 2018 showed elevated globulin.  do lab and f/u for review 3 mo   4. Hyperlipidemia LDL goal <100  Comp Metabolic Panel    Lipid Profile    atorvastatin (LIPITOR) 40 MG Tab    DISCONTINUED: atorvastatin (LIPITOR) 40 MG Tab    stable on lipitor.  refilled med x 90 days only.  do lab and f/u for review   5. Essential hypertension  MICROALBUMIN CREAT RATIO URINE    metoprolol SR (TOPROL XL) 25 MG TABLET SR 24 HR    refilled metoprolol.  do lab and f/u 3 mo   6. Obesity (BMI 30-39.9)  Patient identified as having weight management issue.  Appropriate orders and counseling given.         Followup: Return in about 3 months (around 7/2/2020).

## 2020-04-20 ENCOUNTER — HOSPITAL ENCOUNTER (OUTPATIENT)
Dept: LAB | Facility: MEDICAL CENTER | Age: 30
End: 2020-04-20
Attending: NURSE PRACTITIONER
Payer: COMMERCIAL

## 2020-04-20 DIAGNOSIS — Z79.01 CHRONIC ANTICOAGULATION: ICD-10-CM

## 2020-04-20 LAB
INR PPP: 3.76 (ref 0.87–1.13)
PROTHROMBIN TIME: 38.7 SEC (ref 12–14.6)

## 2020-04-20 PROCEDURE — 85610 PROTHROMBIN TIME: CPT

## 2020-04-20 PROCEDURE — 36415 COLL VENOUS BLD VENIPUNCTURE: CPT

## 2020-04-21 ENCOUNTER — ANTICOAGULATION MONITORING (OUTPATIENT)
Dept: VASCULAR LAB | Facility: MEDICAL CENTER | Age: 30
End: 2020-04-21

## 2020-04-21 DIAGNOSIS — I05.0 MITRAL VALVE STENOSIS, UNSPECIFIED ETIOLOGY: ICD-10-CM

## 2020-04-21 DIAGNOSIS — I48.0 PAROXYSMAL ATRIAL FIBRILLATION (HCC): ICD-10-CM

## 2020-04-21 NOTE — PROGRESS NOTES
Anticoagulation Summary  As of 4/21/2020    INR goal:   2.5-3.5   TTR:   58.3 % (4.8 y)   INR used for dosing:   3.76! (4/20/2020)   Warfarin maintenance plan:   5 mg (5 mg x 1) every Mon, Wed, Fri; 2.5 mg (5 mg x 0.5) all other days   Weekly warfarin total:   25 mg   Plan last modified:   Agata JulesD (11/6/2019)   Next INR check:   5/12/2020   Target end date:   Indefinite    Indications    Paroxysmal atrial fibrillation (HCC) [I48.0]  Mitral stenosis s/p Mechanical MVR [I05.0]             Anticoagulation Episode Summary     INR check location:   Outside Lab    Preferred lab:       Send INR reminders to:       Comments:   [facility]       Anticoagulation Care Providers     Provider Role Specialty Phone number    Bird Rodriguez D.O. Referring Family Medicine [phone]    Trey Dubon M.D. Referring Cardiac Surgery [phone]    Taj Allen, PharmD Responsible          Anticoagulation Patient Findings    Left voicemail message to report a supratherapeutic INR of 3.76.  Requested pt contact the clinic for any s/s of unusual bleeding, bruising, clotting or any changes to diet or medication.   Instructed patient to HOLD X 1, then resume current warfarin regimen.  FU 3 weeks.  Everett Dangelo, AgataD, BCACP

## 2020-05-04 DIAGNOSIS — I10 ESSENTIAL HYPERTENSION: ICD-10-CM

## 2020-05-04 DIAGNOSIS — E78.5 HYPERLIPIDEMIA LDL GOAL <100: ICD-10-CM

## 2020-05-05 RX ORDER — ATORVASTATIN CALCIUM 40 MG/1
TABLET, FILM COATED ORAL
Qty: 90 TAB | Refills: 0 | Status: SHIPPED | OUTPATIENT
Start: 2020-05-05 | End: 2020-08-05 | Stop reason: SDUPTHER

## 2020-05-05 RX ORDER — METOPROLOL SUCCINATE 25 MG/1
25 TABLET, EXTENDED RELEASE ORAL DAILY
Qty: 90 TAB | Refills: 0 | Status: SHIPPED | OUTPATIENT
Start: 2020-05-05 | End: 2020-08-05 | Stop reason: SDUPTHER

## 2020-05-20 ENCOUNTER — HOSPITAL ENCOUNTER (OUTPATIENT)
Dept: LAB | Facility: MEDICAL CENTER | Age: 30
End: 2020-05-20
Attending: NURSE PRACTITIONER
Payer: COMMERCIAL

## 2020-05-20 DIAGNOSIS — Z79.01 CHRONIC ANTICOAGULATION: ICD-10-CM

## 2020-05-20 LAB
INR PPP: 3.89 (ref 0.87–1.13)
PROTHROMBIN TIME: 39.7 SEC (ref 12–14.6)

## 2020-05-20 PROCEDURE — 36415 COLL VENOUS BLD VENIPUNCTURE: CPT

## 2020-05-20 PROCEDURE — 85610 PROTHROMBIN TIME: CPT

## 2020-05-21 ENCOUNTER — ANTICOAGULATION MONITORING (OUTPATIENT)
Dept: VASCULAR LAB | Facility: MEDICAL CENTER | Age: 30
End: 2020-05-21

## 2020-05-21 DIAGNOSIS — I48.0 PAROXYSMAL ATRIAL FIBRILLATION (HCC): ICD-10-CM

## 2020-05-21 DIAGNOSIS — I05.0 MITRAL VALVE STENOSIS, UNSPECIFIED ETIOLOGY: ICD-10-CM

## 2020-05-21 NOTE — PROGRESS NOTES
Anticoagulation Summary  As of 5/21/2020    INR goal:   2.5-3.5   TTR:   57.4 % (4.9 y)   INR used for dosing:   3.89! (5/20/2020)   Warfarin maintenance plan:   5 mg (5 mg x 1) every Mon, Fri; 2.5 mg (5 mg x 0.5) all other days   Weekly warfarin total:   22.5 mg   Plan last modified:   Onelia Pham (5/21/2020)   Next INR check:   6/11/2020   Target end date:   Indefinite    Indications    Paroxysmal atrial fibrillation (HCC) [I48.0]  Mitral stenosis s/p Mechanical MVR [I05.0]             Anticoagulation Episode Summary     INR check location:   Outside Lab    Preferred lab:       Send INR reminders to:       Comments:   [facility]       Anticoagulation Care Providers     Provider Role Specialty Phone number    Bird Rodriguez D.O. Referring Family Medicine [phone]    Trey Dubon M.D. Referring Cardiac Surgery [phone]    Taj Allen, PharmD Responsible          Anticoagulation Patient Findings  Patient Findings     Positives:   Change in diet/appetite    Negatives:   Signs/symptoms of thrombosis, Signs/symptoms of bleeding, Laboratory test error suspected, Change in health, Change in alcohol use, Change in activity, Upcoming invasive procedure, Emergency department visit, Upcoming dental procedure, Missed doses, Extra doses, Change in medications, Hospital admission, Bruising, Other complaints    Comments:   Less greens        Spoke with the patient on the phone today, reporting a SUPRA-therapeutic INR of 3.89. Confirmed the current warfarin dosing regimen and patient compliance. Only interval change the patient reports is less greens in her diet. Patient denies any interval changes to medications. Patient denies any signs/symptoms of bleeding or clotting.  Patient instructed to HOLD dose TONIGHT ONLY, then to begin reduced weekly regimen of 5mg on Mon & Fri and 2.5mg ROW. Patient will retest again in 3 weeks (per pt preference).     Elizabeth Pham  PharmD

## 2020-07-01 ENCOUNTER — OFFICE VISIT (OUTPATIENT)
Dept: MEDICAL GROUP | Facility: MEDICAL CENTER | Age: 30
End: 2020-07-01
Payer: COMMERCIAL

## 2020-07-01 VITALS
SYSTOLIC BLOOD PRESSURE: 108 MMHG | DIASTOLIC BLOOD PRESSURE: 74 MMHG | HEART RATE: 85 BPM | TEMPERATURE: 98.5 F | HEIGHT: 62 IN | BODY MASS INDEX: 30.18 KG/M2 | WEIGHT: 164 LBS | OXYGEN SATURATION: 98 %

## 2020-07-01 DIAGNOSIS — H66.015 RECURRENT ACUTE SUPPURATIVE OTITIS MEDIA WITH SPONTANEOUS RUPTURE OF LEFT TYMPANIC MEMBRANE: ICD-10-CM

## 2020-07-01 PROCEDURE — 99214 OFFICE O/P EST MOD 30 MIN: CPT | Performed by: FAMILY MEDICINE

## 2020-07-01 RX ORDER — AZITHROMYCIN 250 MG/1
TABLET, FILM COATED ORAL
Qty: 6 TAB | Refills: 0 | Status: SHIPPED | OUTPATIENT
Start: 2020-07-01 | End: 2020-07-01

## 2020-07-01 RX ORDER — AZITHROMYCIN 250 MG/1
TABLET, FILM COATED ORAL
Qty: 6 TAB | Refills: 0 | Status: SHIPPED | OUTPATIENT
Start: 2020-07-01 | End: 2020-09-08

## 2020-07-01 RX ORDER — M-VIT,TX,IRON,MINS/CALC/FOLIC 27MG-0.4MG
1 TABLET ORAL DAILY
COMMUNITY

## 2020-07-01 ASSESSMENT — FIBROSIS 4 INDEX: FIB4 SCORE: 0.79

## 2020-07-01 NOTE — PROGRESS NOTES
"Subjective:   Sameer Boston is a 30 y.o. female here today for ear problem    Having white/yellow discharge from left ear for the last 4 weeks. There is no associated pain. Not a swimmer.  She also had a similar infection in September 2019, March 2020, April 2020, all given Amoxil or Augmentin, but infection seems to return.    Current medicines (including changes today)  Current Outpatient Medications   Medication Sig Dispense Refill   • Cholecalciferol (D3 ADULT PO) Take  by mouth.     • therapeutic multivitamin-minerals (THERAGRAN-M) Tab Take 1 Tab by mouth every day.     • azithromycin (ZITHROMAX) 250 MG Tab 2 tabs by mouth day 1, 1 tab by mouth days 2-5 6 Tab 0   • atorvastatin (LIPITOR) 40 MG Tab Take 1 tablet by mouth once daily 90 Tab 0   • metoprolol SR (TOPROL XL) 25 MG TABLET SR 24 HR Take 1 Tab by mouth every day. Needs to be seen for further refills. Thank you 90 Tab 0   • warfarin (COUMADIN) 5 MG Tab TAKE ONE-HALF TO ONE TAB DAILY AS DIRECTED BY  THE  [facility]  ANTICOAGULATION  CLINIC 90 Tab 1     No current facility-administered medications for this visit.      She  has a past medical history of Anemia (4/17/2011), CHF (congestive heart failure) (HCC), Chronic anticoagulation, CVA (cerebral infarction) (2011), Elbow fracture, History of atrial fibrillation, Hyperlipidemia, Left atrial thrombus, Migraine, Mitral stenosis, Pacemaker, Pulmonary hypertension (HCC), Third degree heart block (HCC), and Tricuspid regurgitation. She also has no past medical history of Encounter for long-term (current) use of other medications.    ROS   No fever, no difficulty hearing       Objective:     /74 (BP Location: Right arm, Patient Position: Sitting, BP Cuff Size: Adult)   Pulse 85   Temp 36.9 °C (98.5 °F) (Temporal)   Ht 1.575 m (5' 2\")   Wt 74.4 kg (164 lb)   SpO2 98%  Body mass index is 30 kg/m².   Physical Exam:  Constitutional: Alert, no distress.  Skin: Warm, dry, good turgor, no rashes in " visible areas.  Eye: Equal, round and reactive, conjunctiva clear, lids normal.  ENMT: left TM bulging with erythema and there is purulent discharge in auditory canal.  Psych: Alert and oriented x3, normal affect and mood.        Assessment and Plan:   The following treatment plan was discussed    1. Recurrent acute suppurative otitis media with spontaneous rupture of left tympanic membrane  New problem. Trial of Z-waylon and referral to ENT because of recurrent nature.  Check INR in 2 days because she is on coumadin.  - azithromycin (ZITHROMAX) 250 MG Tab; 2 tabs by mouth day 1, 1 tab by mouth days 2-5  Dispense: 6 Tab; Refill: 0  - REFERRAL TO ENT        Followup: Return if symptoms worsen or fail to improve.

## 2020-07-06 ENCOUNTER — HOSPITAL ENCOUNTER (OUTPATIENT)
Dept: LAB | Facility: MEDICAL CENTER | Age: 30
End: 2020-07-06
Attending: NURSE PRACTITIONER
Payer: COMMERCIAL

## 2020-07-06 ENCOUNTER — ANTICOAGULATION MONITORING (OUTPATIENT)
Dept: VASCULAR LAB | Facility: MEDICAL CENTER | Age: 30
End: 2020-07-06

## 2020-07-06 DIAGNOSIS — I05.0 MITRAL VALVE STENOSIS, UNSPECIFIED ETIOLOGY: ICD-10-CM

## 2020-07-06 DIAGNOSIS — I48.0 PAROXYSMAL ATRIAL FIBRILLATION (HCC): ICD-10-CM

## 2020-07-06 DIAGNOSIS — Z79.01 CHRONIC ANTICOAGULATION: ICD-10-CM

## 2020-07-06 DIAGNOSIS — R77.1 ABNORMALITY OF GLOBULIN: ICD-10-CM

## 2020-07-06 LAB
INR PPP: 1.75 (ref 0.87–1.13)
PROTHROMBIN TIME: 20.8 SEC (ref 12–14.6)

## 2020-07-06 PROCEDURE — 36415 COLL VENOUS BLD VENIPUNCTURE: CPT

## 2020-07-06 PROCEDURE — 85610 PROTHROMBIN TIME: CPT

## 2020-07-06 NOTE — PROGRESS NOTES
OP Telephone Anticoagulation Service Note    Date: 2020      Anticoagulation Summary  As of 2020    INR goal:   2.5-3.5   TTR:   57.1 % (5 y)   INR used for dosin.75! (2020)   Warfarin maintenance plan:   5 mg (5 mg x 1) every Mon, Wed, Fri; 2.5 mg (5 mg x 0.5) all other days   Weekly warfarin total:   25 mg   Plan last modified:   Estrella Gonzales PharmD (2020)   Next INR check:   2020   Target end date:   Indefinite    Indications    Paroxysmal atrial fibrillation (HCC) [I48.0]  Mitral stenosis s/p Mechanical MVR [I05.0]             Anticoagulation Episode Summary     INR check location:   Outside Lab    Preferred lab:       Send INR reminders to:       Comments:   [facility]       Anticoagulation Care Providers     Provider Role Specialty Phone number    Bird Rodriguez D.O. Referring Family Medicine [phone]    Trey Dubon M.D. Referring Cardiac Surgery [phone]    Taj Allen, PharmD Responsible          Anticoagulation Patient Findings        Plan: INR is low. Left message on patient's answering machine/voicemail. Instructed patient to call back with any concerns regarding any unusual bleeding or bruising, any medication or diet changes or any signs or symptoms of thrombosis. Instructed patient to take 7.5 mg then  Increase weekly regimen. Patient to follow up in 2 week(s).             Estrella Gonzales, [place]

## 2020-07-12 ENCOUNTER — APPOINTMENT (OUTPATIENT)
Dept: RADIOLOGY | Facility: MEDICAL CENTER | Age: 30
End: 2020-07-12
Attending: EMERGENCY MEDICINE
Payer: COMMERCIAL

## 2020-07-12 ENCOUNTER — HOSPITAL ENCOUNTER (EMERGENCY)
Facility: MEDICAL CENTER | Age: 30
End: 2020-07-12
Attending: EMERGENCY MEDICINE
Payer: COMMERCIAL

## 2020-07-12 VITALS
TEMPERATURE: 98.1 F | RESPIRATION RATE: 17 BRPM | SYSTOLIC BLOOD PRESSURE: 121 MMHG | HEART RATE: 86 BPM | BODY MASS INDEX: 29.74 KG/M2 | WEIGHT: 161.6 LBS | OXYGEN SATURATION: 98 % | DIASTOLIC BLOOD PRESSURE: 61 MMHG | HEIGHT: 62 IN

## 2020-07-12 DIAGNOSIS — R42 VERTIGO: ICD-10-CM

## 2020-07-12 DIAGNOSIS — H83.02 LABYRINTHITIS OF LEFT EAR: ICD-10-CM

## 2020-07-12 LAB
ALBUMIN SERPL BCP-MCNC: 4.7 G/DL (ref 3.2–4.9)
ALBUMIN/GLOB SERPL: 1.2 G/DL
ALP SERPL-CCNC: 101 U/L (ref 30–99)
ALT SERPL-CCNC: 21 U/L (ref 2–50)
ANION GAP SERPL CALC-SCNC: 13 MMOL/L (ref 7–16)
AST SERPL-CCNC: 22 U/L (ref 12–45)
BASOPHILS # BLD AUTO: 0.9 % (ref 0–1.8)
BASOPHILS # BLD: 0.06 K/UL (ref 0–0.12)
BILIRUB SERPL-MCNC: 0.3 MG/DL (ref 0.1–1.5)
BUN SERPL-MCNC: 7 MG/DL (ref 8–22)
CALCIUM SERPL-MCNC: 9.7 MG/DL (ref 8.5–10.5)
CHLORIDE SERPL-SCNC: 102 MMOL/L (ref 96–112)
CO2 SERPL-SCNC: 23 MMOL/L (ref 20–33)
CREAT SERPL-MCNC: 0.66 MG/DL (ref 0.5–1.4)
EOSINOPHIL # BLD AUTO: 0.17 K/UL (ref 0–0.51)
EOSINOPHIL NFR BLD: 2.6 % (ref 0–6.9)
ERYTHROCYTE [DISTWIDTH] IN BLOOD BY AUTOMATED COUNT: 43.1 FL (ref 35.9–50)
GLOBULIN SER CALC-MCNC: 4 G/DL (ref 1.9–3.5)
GLUCOSE SERPL-MCNC: 88 MG/DL (ref 65–99)
HCT VFR BLD AUTO: 47.3 % (ref 37–47)
HGB BLD-MCNC: 15.4 G/DL (ref 12–16)
LYMPHOCYTES # BLD AUTO: 2.37 K/UL (ref 1–4.8)
LYMPHOCYTES NFR BLD: 35.3 % (ref 22–41)
MANUAL DIFF BLD: NORMAL
MCH RBC QN AUTO: 29.7 PG (ref 27–33)
MCHC RBC AUTO-ENTMCNC: 32.6 G/DL (ref 33.6–35)
MCV RBC AUTO: 91.1 FL (ref 81.4–97.8)
MONOCYTES # BLD AUTO: 0.52 K/UL (ref 0–0.85)
MONOCYTES NFR BLD AUTO: 7.8 % (ref 0–13.4)
MORPHOLOGY BLD-IMP: NORMAL
NEUTROPHILS # BLD AUTO: 3.58 K/UL (ref 2–7.15)
NEUTROPHILS NFR BLD: 53.4 % (ref 44–72)
NRBC # BLD AUTO: 0 K/UL
NRBC BLD-RTO: 0 /100 WBC
PLATELET # BLD AUTO: 266 K/UL (ref 164–446)
PLATELET BLD QL SMEAR: NORMAL
PMV BLD AUTO: 10.5 FL (ref 9–12.9)
POTASSIUM SERPL-SCNC: 4 MMOL/L (ref 3.6–5.5)
PROT SERPL-MCNC: 8.7 G/DL (ref 6–8.2)
RBC # BLD AUTO: 5.19 M/UL (ref 4.2–5.4)
RBC BLD AUTO: NORMAL
SODIUM SERPL-SCNC: 138 MMOL/L (ref 135–145)
WBC # BLD AUTO: 6.7 K/UL (ref 4.8–10.8)

## 2020-07-12 PROCEDURE — 700105 HCHG RX REV CODE 258: Performed by: EMERGENCY MEDICINE

## 2020-07-12 PROCEDURE — 85027 COMPLETE CBC AUTOMATED: CPT

## 2020-07-12 PROCEDURE — 99283 EMERGENCY DEPT VISIT LOW MDM: CPT

## 2020-07-12 PROCEDURE — 700102 HCHG RX REV CODE 250 W/ 637 OVERRIDE(OP): Performed by: EMERGENCY MEDICINE

## 2020-07-12 PROCEDURE — 70450 CT HEAD/BRAIN W/O DYE: CPT

## 2020-07-12 PROCEDURE — 85007 BL SMEAR W/DIFF WBC COUNT: CPT

## 2020-07-12 PROCEDURE — 80053 COMPREHEN METABOLIC PANEL: CPT

## 2020-07-12 PROCEDURE — A9270 NON-COVERED ITEM OR SERVICE: HCPCS | Performed by: EMERGENCY MEDICINE

## 2020-07-12 RX ORDER — MECLIZINE HYDROCHLORIDE 25 MG/1
25 TABLET ORAL ONCE
Status: COMPLETED | OUTPATIENT
Start: 2020-07-12 | End: 2020-07-12

## 2020-07-12 RX ORDER — METHYLPREDNISOLONE 4 MG/1
TABLET ORAL
Qty: 1 PACKAGE | Refills: 0 | Status: SHIPPED | OUTPATIENT
Start: 2020-07-12 | End: 2020-09-08

## 2020-07-12 RX ORDER — SODIUM CHLORIDE, SODIUM LACTATE, POTASSIUM CHLORIDE, CALCIUM CHLORIDE 600; 310; 30; 20 MG/100ML; MG/100ML; MG/100ML; MG/100ML
1000 INJECTION, SOLUTION INTRAVENOUS ONCE
Status: COMPLETED | OUTPATIENT
Start: 2020-07-12 | End: 2020-07-12

## 2020-07-12 RX ORDER — MECLIZINE HYDROCHLORIDE 25 MG/1
25 TABLET ORAL 3 TIMES DAILY PRN
Qty: 30 TAB | Refills: 0 | Status: SHIPPED | OUTPATIENT
Start: 2020-07-12 | End: 2020-09-08

## 2020-07-12 RX ADMIN — MECLIZINE HYDROCHLORIDE 25 MG: 25 TABLET ORAL at 16:00

## 2020-07-12 RX ADMIN — SODIUM CHLORIDE, POTASSIUM CHLORIDE, SODIUM LACTATE AND CALCIUM CHLORIDE 1000 ML: 600; 310; 30; 20 INJECTION, SOLUTION INTRAVENOUS at 16:00

## 2020-07-12 ASSESSMENT — FIBROSIS 4 INDEX: FIB4 SCORE: 0.79

## 2020-07-12 NOTE — ED TRIAGE NOTES
Chief Complaint   Patient presents with   • Dizziness     room spinning   • Headache     Symptoms began 45 minutes pta.  Triage process explained to patient.  Pt back to waiting room.  Pt instructed to inform RN if any changes or questions arise.

## 2020-07-12 NOTE — ED PROVIDER NOTES
ED Provider Note    Scribed for Catalino Whaley M.D. by Fabio Cordoba. 7/12/2020  3:25 PM    Primary care provider: XAVI Rey  Means of arrival: Walk-in  History obtained from: Patient  History limited by: None    CHIEF COMPLAINT  Chief Complaint   Patient presents with   • Dizziness     room spinning   • Headache       HPI  Sameer Boston is a 30 y.o. female who presents to the Emergency Department for acute, intermittent paraesthesia to her left neck/face extending through her arm to her fingers onset 1.5 hours ago. Patient reports recently having an ear infection to her left ear, and was treated with antibiotics 1 week ago. She has a history of recurring infections to her left ear and has been referred to ENT by her PCP. Patient also reports having vertigo and a headache. There are no known alleviating or exacerbating factors. Denies any associated fevers or vomiting. 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     REVIEW OF SYSTEMS  Pertinent negatives include no fevers or vomiting. As above, all other systems reviewed and are negative.   See HPI for further details.     PAST MEDICAL HISTORY   has a past medical history of Anemia (4/17/2011), CHF (congestive heart failure) (HCC), Chronic anticoagulation, CVA (cerebral infarction) (2011), Elbow fracture, History of atrial fibrillation, Hyperlipidemia, Left atrial thrombus, Migraine, Mitral stenosis, Pacemaker, Pulmonary hypertension (HCC), Third degree heart block (HCC), and Tricuspid regurgitation.    SURGICAL HISTORY   has a past surgical history that includes primary c section (4/6/2011); mitral valve replace (4/25/2011); tricuspid valve repair (4/25/2011); mass excision general (4/25/2011); pelviscopy (9/6/2011); intra uterine device removal (9/6/2011); tubal coagulation laparoscopic bilateral (9/6/2011); elbow orif (Right, 1/26/2016); and pacemaker insertion (2011).    SOCIAL HISTORY  Social History     Tobacco Use   • Smoking status: Never Smoker   •  "Smokeless tobacco: Never Used   Substance Use Topics   • Alcohol use: No     Alcohol/week: 0.0 oz   • Drug use: No      Social History     Substance and Sexual Activity   Drug Use No       FAMILY HISTORY  Family History   Problem Relation Age of Onset   • Diabetes Maternal Grandmother         IDDM   • Hypertension Maternal Grandmother         meds   • Heart Disease Maternal Grandfather    • Diabetes Paternal Grandmother         esrd       CURRENT MEDICATIONS  Current Outpatient Medications:   •  Cholecalciferol (D3 ADULT PO), Take  by mouth., Disp: , Rfl:   •  therapeutic multivitamin-minerals (THERAGRAN-M) Tab, Take 1 Tab by mouth every day., Disp: , Rfl:   •  azithromycin (ZITHROMAX) 250 MG Tab, TAKE 2 TABLETS BY MOUTH ON DAY 1, AND THEN TAKE 1 TABLET BY MOUTH ONCE A DAY ON DAY 2 THROUGH DAY 5, Disp: 6 Tab, Rfl: 0  •  atorvastatin (LIPITOR) 40 MG Tab, Take 1 tablet by mouth once daily, Disp: 90 Tab, Rfl: 0  •  metoprolol SR (TOPROL XL) 25 MG TABLET SR 24 HR, Take 1 Tab by mouth every day. Needs to be seen for further refills. Thank you, Disp: 90 Tab, Rfl: 0  •  warfarin (COUMADIN) 5 MG Tab, TAKE ONE-HALF TO ONE TAB DAILY AS DIRECTED BY  THE  [facility]  ANTICOAGULATION  CLINIC, Disp: 90 Tab, Rfl: 1    ALLERGIES  Allergies   Allergen Reactions   • No Known Drug Allergy        PHYSICAL EXAM  VITAL SIGNS: /76   Pulse 89   Temp 36.7 °C (98.1 °F) (Oral)   Resp 16   Ht 1.575 m (5' 2\")   Wt 73.3 kg (161 lb 9.6 oz)   SpO2 98%   BMI 29.56 kg/m²   Constitutional: Well developed, Well nourished, No acute distress, Non-toxic appearance.   HENT: Stippling and mild erythema of the left TM with evidence of prior perforation. Normocephalic, Atraumatic, Bilateral external ears normal, Oropharynx is clear mucous membranes are moist. No oral exudates or nasal discharge.   Eyes: Nystagmus. Pupils are equal round and reactive, EOMI, Conjunctiva normal, No discharge.   Neck: Normal range of motion, No tenderness, Supple, No " stridor. No meningismus.  Lymphatic: No lymphadenopathy noted.   Cardiovascular: Regular rate and rhythm without murmur rub or gallop.  Thorax & Lungs: Clear breath sounds bilaterally without wheezes, rhonchi or rales. There is no chest wall tenderness.   Abdomen: Soft non-tender non-distended. There is no rebound or guarding. No organomegaly is appreciated. Bowel sounds are normal.  Skin: Normal without rash.   Back: No CVA or spinal tenderness.   Extremities: Intact distal pulses, No edema, No tenderness, No cyanosis, No clubbing. Capillary refill is less than 2 seconds.  Musculoskeletal: Good range of motion in all major joints. No tenderness to palpation or major deformities noted.   Neurologic: Alert & oriented x 3, Normal motor function, Normal sensory function, No focal deficits noted. Reflexes are normal.  Psychiatric: Affect normal, Judgment normal, Mood normal. There is no suicidal ideation or patient reported hallucinations.       DIAGNOSTIC STUDIES / PROCEDURES    LABS  Labs Reviewed   CBC WITH DIFFERENTIAL - Abnormal; Notable for the following components:       Result Value    Hematocrit 47.3 (*)     MCHC 32.6 (*)     All other components within normal limits   COMP METABOLIC PANEL - Abnormal; Notable for the following components:    Bun 7 (*)     Alkaline Phosphatase 101 (*)     Total Protein 8.7 (*)     Globulin 4.0 (*)     All other components within normal limits   ESTIMATED GFR   DIFFERENTIAL MANUAL   PERIPHERAL SMEAR REVIEW   PLATELET ESTIMATE   MORPHOLOGY      All labs reviewed by me.    RADIOLOGY  CT-HEAD W/O   Final Result         NO ACUTE ABNORMALITIES ARE NOTED ON CT SCAN OF THE HEAD.      Possible left-sided mastoiditis.        The radiologist's interpretation of all radiological studies have been reviewed by me.    COURSE & MEDICAL DECISION MAKING  Nursing notes, VS, PMSFHx reviewed in chart.    3:25 PM Patient seen and examined at bedside. Ordered for CT-head w/o, CBC with diff, and CMP to  evaluate. Patient was treated with Antivert 25 mg and LR Bolus for her symptoms.      CT scan shows no evidence of intracranial bleed or mass.  I do not think this is a stroke syndrome.    4:49 PM Patient was reevaluated at bedside; she reports feeling improved. She will be discharged with prescription for Anitvert and Medrol Dosepak. Discussed lab and radiology results with the patient and informed them they are reassuring.  Laboratory evaluation reveals no leukocytosis, shift, anemia or electrolyte derangements.      I do think the patient has a labyrinthitis is and vertigo as a result of her ear infections recently.  I do not think another course of antibiotics is warranted but some Medrol may help calm inflammation of her inner ear.      She has been referred to ENT by her primary care provider.  Return precautions were discussed with the patient, and they were cleared for discharge at this time. Patient was understanding and agreeable to discharge.     HYDRATION: Based on the patient's presentation of vertigo/NPO status the patient was given IV fluids. IV Hydration was used because oral hydration was not adequate alone. Upon recheck following hydration, the patient was much improved feeling better and was not dizzy at all.    The patient will return for new or worsening symptoms and is stable at the time of discharge.    DISPOSITION:  Patient will be discharged home in stable condition.  She is prescribed Medrol Dosepak and meclizine to be used as needed    FINAL IMPRESSION  1. Vertigo    2. Labyrinthitis of left ear          Fabio CHRISTIAN (Tejinder), am scribing for, and in the presence of, Catalino Whaley M.D..    Electronically signed by: Fabio Cordoba (Tejinder), 7/12/2020    Catalino CHRISTIAN M.D. personally performed the services described in this documentation, as scribed by Fabio Cordoba in my presence, and it is both accurate and complete. C.    The note accurately reflects work and decisions made by me.   Catalino Whaley M.D.  7/12/2020  5:07 PM

## 2020-07-13 NOTE — ED NOTES
Pt d/c home with steady gait. Pt instructed on follow up with pcp. Stated understanding. Pt educated on medications she is picking up at pharmacy and stated understanding. Pt d/c home.

## 2020-07-16 ENCOUNTER — HOSPITAL ENCOUNTER (OUTPATIENT)
Dept: LAB | Facility: MEDICAL CENTER | Age: 30
End: 2020-07-16
Attending: NURSE PRACTITIONER
Payer: COMMERCIAL

## 2020-07-16 ENCOUNTER — ANTICOAGULATION MONITORING (OUTPATIENT)
Dept: VASCULAR LAB | Facility: MEDICAL CENTER | Age: 30
End: 2020-07-16

## 2020-07-16 DIAGNOSIS — I48.0 PAROXYSMAL ATRIAL FIBRILLATION (HCC): ICD-10-CM

## 2020-07-16 DIAGNOSIS — Z79.01 CHRONIC ANTICOAGULATION: ICD-10-CM

## 2020-07-16 LAB
INR PPP: 3.85 (ref 0.87–1.13)
PROTHROMBIN TIME: 39 SEC (ref 12–14.6)

## 2020-07-16 PROCEDURE — 36415 COLL VENOUS BLD VENIPUNCTURE: CPT

## 2020-07-16 PROCEDURE — 85610 PROTHROMBIN TIME: CPT

## 2020-07-16 NOTE — PROGRESS NOTES
Anticoagulation Summary  As of 7/16/2020    INR goal:   2.5-3.5   TTR:   57.1 % (5.1 y)   INR used for dosing:   3.85! (7/16/2020)   Warfarin maintenance plan:   5 mg (5 mg x 1) every Mon, Wed, Fri; 2.5 mg (5 mg x 0.5) all other days   Weekly warfarin total:   25 mg   Plan last modified:   Estrella Gonzales PharmD (7/6/2020)   Next INR check:   7/23/2020   Target end date:   Indefinite    Indications    Paroxysmal atrial fibrillation (HCC) [I48.0]  Mitral stenosis s/p Mechanical MVR [I05.0]             Anticoagulation Episode Summary     INR check location:   Outside Lab    Preferred lab:       Send INR reminders to:       Comments:   [facility]       Anticoagulation Care Providers     Provider Role Specialty Phone number    Bird Rodriguez D.O. Referring Family Medicine [phone]    Trey Dubon M.D. Referring Cardiac Surgery [phone]    Agata SantosD Responsible          Anticoagulation Patient Findings      INR  supra-therapeutic.   Left a voice message for the patient, asked patient to please call the anticoagulation clinic if they have any signs/symptoms of bleeding and/or thrombosis or any changes to diet or medications.    Follow up appointment in 1 week(s)    Hold today then continue weekly warfarin dose as noted      Tan Helton, [place], MS, BCACP, LCC    This note was created using voice recognition software (Dragon). The accuracy of the dictation is limited by the abilities of the software. I have reviewed the note prior to signing, however some errors in grammar and context are still possible. If you have any questions related to this note please do not hesitate to contact our office.

## 2020-08-05 DIAGNOSIS — E78.5 HYPERLIPIDEMIA LDL GOAL <100: ICD-10-CM

## 2020-08-05 DIAGNOSIS — I10 ESSENTIAL HYPERTENSION: ICD-10-CM

## 2020-08-05 RX ORDER — METOPROLOL SUCCINATE 25 MG/1
25 TABLET, EXTENDED RELEASE ORAL DAILY
Qty: 90 TAB | Refills: 0 | Status: SHIPPED | OUTPATIENT
Start: 2020-08-05 | End: 2020-09-08 | Stop reason: SDUPTHER

## 2020-08-05 RX ORDER — ATORVASTATIN CALCIUM 40 MG/1
40 TABLET, FILM COATED ORAL DAILY
Qty: 90 TAB | Refills: 0 | Status: SHIPPED | OUTPATIENT
Start: 2020-08-05 | End: 2020-09-08 | Stop reason: SDUPTHER

## 2020-08-11 ENCOUNTER — TELEPHONE (OUTPATIENT)
Dept: VASCULAR LAB | Facility: MEDICAL CENTER | Age: 30
End: 2020-08-11

## 2020-08-19 DIAGNOSIS — Z79.01 CHRONIC ANTICOAGULATION: ICD-10-CM

## 2020-08-20 DIAGNOSIS — Z79.01 CHRONIC ANTICOAGULATION: ICD-10-CM

## 2020-08-24 ENCOUNTER — ANTICOAGULATION MONITORING (OUTPATIENT)
Dept: VASCULAR LAB | Facility: MEDICAL CENTER | Age: 30
End: 2020-08-24

## 2020-08-24 ENCOUNTER — HOSPITAL ENCOUNTER (OUTPATIENT)
Dept: LAB | Facility: MEDICAL CENTER | Age: 30
End: 2020-08-24
Attending: NURSE PRACTITIONER
Payer: COMMERCIAL

## 2020-08-24 DIAGNOSIS — Z79.01 CHRONIC ANTICOAGULATION: ICD-10-CM

## 2020-08-24 DIAGNOSIS — I48.0 PAROXYSMAL ATRIAL FIBRILLATION (HCC): ICD-10-CM

## 2020-08-24 DIAGNOSIS — I05.0 MITRAL VALVE STENOSIS, UNSPECIFIED ETIOLOGY: ICD-10-CM

## 2020-08-24 LAB
INR PPP: 2.95 (ref 0.87–1.13)
PROTHROMBIN TIME: 31.6 SEC (ref 12–14.6)

## 2020-08-24 PROCEDURE — 85610 PROTHROMBIN TIME: CPT

## 2020-08-24 PROCEDURE — 36415 COLL VENOUS BLD VENIPUNCTURE: CPT

## 2020-08-24 NOTE — PROGRESS NOTES
OP   Telephone Anticoagulation Service Note      Anticoagulation Summary  As of 2020    INR goal:  2.5-3.5   TTR:  57.1 % (5.2 y)   INR used for dosin.95 (2020)   Warfarin maintenance plan:  5 mg (5 mg x 1) every Mon, Wed, Fri; 2.5 mg (5 mg x 0.5) all other days   Weekly warfarin total:  25 mg   No change documented:  Onelia Pham   Plan last modified:  Tan Helton PharmD (2020)   Next INR check:  2020   Target end date:  Indefinite    Indications    Paroxysmal atrial fibrillation (HCC) [I48.0]  Mitral stenosis s/p Mechanical MVR [I05.0]             Anticoagulation Episode Summary     INR check location:  Outside Lab    Preferred lab:      Send INR reminders to:      Comments:  [facility]       Anticoagulation Care Providers     Provider Role Specialty Phone number    Bird Rodriguez D.O. Referring Family Medicine [phone]    Trey Dubon M.D. Referring Cardiac Surgery [phone]    Taj Allen, PharmD Responsible          Anticoagulation Patient Findings     Left a message on the patient's voicemail today, informing the patient of a therapeutic INR of 2.95. Instructed the patient to call the clinic with any changes to diet or medications, with any signs/sx of bleeding or clotting or with any questions or concerns.     Patient instructed to continue with the current warfarin dosing regimen, and asked to follow up again in 4 weeks.     Elizabeth ParmarD

## 2020-09-08 ENCOUNTER — OFFICE VISIT (OUTPATIENT)
Dept: CARDIOLOGY | Facility: MEDICAL CENTER | Age: 30
End: 2020-09-08
Payer: COMMERCIAL

## 2020-09-08 VITALS
BODY MASS INDEX: 29.63 KG/M2 | DIASTOLIC BLOOD PRESSURE: 74 MMHG | OXYGEN SATURATION: 96 % | HEIGHT: 62 IN | HEART RATE: 84 BPM | WEIGHT: 161 LBS | SYSTOLIC BLOOD PRESSURE: 112 MMHG

## 2020-09-08 DIAGNOSIS — I48.0 PAROXYSMAL ATRIAL FIBRILLATION (HCC): ICD-10-CM

## 2020-09-08 DIAGNOSIS — Z79.01 CHRONIC ANTICOAGULATION: ICD-10-CM

## 2020-09-08 DIAGNOSIS — Z95.0 PACEMAKER: ICD-10-CM

## 2020-09-08 DIAGNOSIS — I51.3 THROMBSIS OF LEFT ATRIAL APPENDAGE WITHOUT ANTECEDENT MYOCARDIAL INFARCTION: ICD-10-CM

## 2020-09-08 DIAGNOSIS — I10 ESSENTIAL HYPERTENSION: ICD-10-CM

## 2020-09-08 DIAGNOSIS — I05.0 RHEUMATIC MITRAL STENOSIS: ICD-10-CM

## 2020-09-08 DIAGNOSIS — E78.5 DYSLIPIDEMIA: ICD-10-CM

## 2020-09-08 DIAGNOSIS — E78.5 HYPERLIPIDEMIA LDL GOAL <100: ICD-10-CM

## 2020-09-08 DIAGNOSIS — I07.1 RHEUMATIC TRICUSPID VALVE REGURGITATION: ICD-10-CM

## 2020-09-08 PROCEDURE — 99214 OFFICE O/P EST MOD 30 MIN: CPT | Performed by: PHYSICIAN ASSISTANT

## 2020-09-08 RX ORDER — ATORVASTATIN CALCIUM 20 MG/1
20 TABLET, FILM COATED ORAL DAILY
Qty: 90 TAB | Refills: 1 | Status: SHIPPED | OUTPATIENT
Start: 2020-09-08 | End: 2022-02-07

## 2020-09-08 RX ORDER — METOPROLOL SUCCINATE 25 MG/1
25 TABLET, EXTENDED RELEASE ORAL DAILY
Qty: 90 TAB | Refills: 3 | Status: SHIPPED | OUTPATIENT
Start: 2020-09-08 | End: 2021-11-02

## 2020-09-08 RX ORDER — ATORVASTATIN CALCIUM 40 MG/1
40 TABLET, FILM COATED ORAL DAILY
Qty: 90 TAB | Refills: 3 | Status: SHIPPED | OUTPATIENT
Start: 2020-09-08 | End: 2020-09-08

## 2020-09-08 ASSESSMENT — ENCOUNTER SYMPTOMS
WEAKNESS: 0
ABDOMINAL PAIN: 0
WEIGHT GAIN: 1
SYNCOPE: 0
DECREASED APPETITE: 0
BRUISES/BLEEDS EASILY: 0
DIZZINESS: 0
VOMITING: 0
FEVER: 0
ORTHOPNEA: 0
PALPITATIONS: 0
SHORTNESS OF BREATH: 0
BLURRED VISION: 0
SNORING: 0
NEAR-SYNCOPE: 0
NAUSEA: 0
DIAPHORESIS: 0
MYALGIAS: 0
BLOATING: 0
DYSPNEA ON EXERTION: 0

## 2020-09-08 ASSESSMENT — FIBROSIS 4 INDEX: FIB4 SCORE: 0.58

## 2020-09-08 NOTE — PROGRESS NOTES
Cardiology Clinic Follow-up Note    Date of note:    2020  Primary Care Provider: XAVI Rey    Name:             Sameer Boston  YOB: 1990  MRN:               [id number]    CC: mechanical MV    Primary Cardiologist: Dr. Mahmood     Patient HPI:   Sameer Boston is a 30 y.o. 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  She has hx of St Joe PPM for CHB, on chronic warfarin therapy.  She also has dyslipidemia.       Interim History:  Ms. Boston was last seen in this cardiology office by Dr. Mahmood in 2019.      Sameer is overall doing well.   Notes she has gained about 10 lbs, since our last visit.  She also wonders if she can get off her cholesterol pill.    Doing well with warfarin, no bleeding issues.   Follows with coumadin clinic.    Missed her 6 month PPM check due to COVID.    Review of Systems   Constitution: Positive for weight gain. Negative for decreased appetite, diaphoresis, fever and malaise/fatigue.   Eyes: Negative for blurred vision.   Cardiovascular: Negative for chest pain, dyspnea on exertion, leg swelling, near-syncope, orthopnea, palpitations and syncope.   Respiratory: Negative for shortness of breath and snoring.    Hematologic/Lymphatic: Negative for bleeding problem. Does not bruise/bleed easily.   Skin: Negative for rash.   Musculoskeletal: Negative for joint pain and myalgias.   Gastrointestinal: Negative for bloating, abdominal pain, nausea and vomiting.   Genitourinary: Negative for frequency.   Neurological: Negative for dizziness and weakness.       Past Medical History:   Diagnosis Date   • Anemia 2011   • CHF (congestive heart failure) (HCC)    • Chronic anticoagulation    • CVA (cerebral infarction)    • Elbow fracture    • History of atrial fibrillation    • Hyperlipidemia    • Left atrial  thrombus    • Migraine     occasional   • Mitral stenosis     s/p mechanical MVR on coumadin   • Pacemaker     [place] for 3rd degree AVB   • Pulmonary hypertension (HCC)    • Third degree heart block (HCC)    • Tricuspid regurgitation     s/p repair       Family History   Problem Relation Age of Onset   • Diabetes Maternal Grandmother         IDDM   • Hypertension Maternal Grandmother         meds   • Heart Disease Maternal Grandfather    • Diabetes Paternal Grandmother         esrd       Social History     Socioeconomic History   • Marital status: Single     Spouse name: Not on file   • Number of children: Not on file   • Years of education: Not on file   • Highest education level: Not on file   Occupational History   • Not on file   Social Needs   • Financial resource strain: Not on file   • Food insecurity     Worry: Not on file     Inability: Not on file   • Transportation needs     Medical: Not on file     Non-medical: Not on file   Tobacco Use   • Smoking status: Never Smoker   • Smokeless tobacco: Never Used   Substance and Sexual Activity   • Alcohol use: No     Alcohol/week: 0.0 oz   • Drug use: No   • Sexual activity: Not Currently     Partners: Male   Lifestyle   • Physical activity     Days per week: Not on file     Minutes per session: Not on file   • Stress: Not on file   Relationships   • Social connections     Talks on phone: Not on file     Gets together: Not on file     Attends [employer] service: Not on file     Active member of club or organization: Not on file     Attends meetings of clubs or organizations: Not on file     Relationship status: Not on file   • Intimate partner violence     Fear of current or ex partner: Not on file     Emotionally abused: Not on file     Physically abused: Not on file     Forced sexual activity: Not on file   Other Topics Concern   • Not on file   Social History Narrative   • Not on file       Current Outpatient Medications   Medication Sig Dispense Refill   •  "atorvastatin (LIPITOR) 40 MG Tab Take 1 Tab by mouth every day. 90 Tab 3   • metoprolol SR (TOPROL XL) 25 MG TABLET SR 24 HR Take 1 Tab by mouth every day. 90 Tab 3   • Cholecalciferol (D3 ADULT PO) Take  by mouth.     • therapeutic multivitamin-minerals (THERAGRAN-M) Tab Take 1 Tab by mouth every day.     • warfarin (COUMADIN) 5 MG Tab TAKE ONE-HALF TO ONE TAB DAILY AS DIRECTED BY  THE  [facility] 90 Tab 1     No current facility-administered medications for this visit.        Allergies   Allergen Reactions   • No Known Drug Allergy          Physical Exam:  Ambulatory Vitals  /74 (BP Location: Left arm, Patient Position: Sitting)   Pulse 84   Ht 1.575 m (5' 2\")   Wt 73 kg (161 lb)   SpO2 96%    BP Readings from Last 4 Encounters:   09/08/20 112/74   07/12/20 121/61   07/01/20 108/74   04/02/20 100/80       Weight/BMI: Body mass index is 29.45 kg/m².  Wt Readings from Last 4 Encounters:   09/08/20 73 kg (161 lb)   07/12/20 73.3 kg (161 lb 9.6 oz)   07/01/20 74.4 kg (164 lb)   04/02/20 75.3 kg (166 lb)       General: no apparent distress  Eyes: normal conjunctiva  ENT: OP clear  Neck: no JVD   Lungs: normal respiratory effort, clear without crackles, no wheezing or rhonchi.  Heart: normal rate, regular rhythm, systolic click at LSB, no murmurs  Ext:  no edema bilateral lower extremities. + bilateral pedal pulses. no cyanosis.  Neurological: No focal deficits, no facial asymmetry.  Normal speech.  Psychiatric: Appropriate affect, alert and oriented x 3.   Skin: Warm extremities, no rash.      Lab Data Review:  Lab Results   Component Value Date/Time    CHOLSTRLTOT 183 09/07/2019 09:00 AM     (H) 09/07/2019 09:00 AM    HDL 47 09/07/2019 09:00 AM    TRIGLYCERIDE 109 09/07/2019 09:00 AM       Lab Results   Component Value Date/Time    SODIUM 138 07/12/2020 03:41 PM    POTASSIUM 4.0 07/12/2020 03:41 PM    CHLORIDE 102 07/12/2020 03:41 PM    CO2 23 07/12/2020 03:41 PM    GLUCOSE 88 " 2020 03:41 PM    BUN 7 (L) 2020 03:41 PM    CREATININE 0.66 2020 03:41 PM     Lab Results   Component Value Date/Time    ALKPHOSPHAT 101 (H) 2020 03:41 PM    ASTSGOT 22 2020 03:41 PM    ALTSGPT 21 2020 03:41 PM    TBILIRUBIN 0.3 2020 03:41 PM      Lab Results   Component Value Date/Time    WBC 6.7 2020 03:41 PM         Cardiac Imaging and Procedures Review:      EP Gen change 2018.    Echo 16:  CONCLUSIONS  Compared to the images of the study done 2012 - there has been no   significant change.   Known mitral valve mechanical prosthesis which is functioning normally   with appropriate transvalvular gradient. No mitral regurgitation.  PPG:   10.98 mmHg.  MP.18 mmHg.  Normal left ventricular chamber size. Normal left ventricular wall   thickness. Normal left ventricular systolic function. Left ventricular   ejection fraction is visually estimated to be 60%. Normal regional wall   motion. NO significant diastolic dysfunction.  Right heart pressures are normal.   The aortic root is normal.  Ascending aorta diameter is 2.4 cm.    2011 EP:  CONCLUSIONS  1.  Successful implantation of a St Joe dual-chamber pacemaker with adequate  pacing and sensing function.  2.  No acute complications.    CT surgery Dr. Dubon 2011:  OPERATION PERFORMED:  Mitral valve replacement (27-mm [place]  mechanical valve), tricuspid valve repair (34-mm C-E rigid annuloplasty  ring), left atrial thrombectomy, left atrial appendage exclusion, and  intraoperative transesophageal echocardiography.    [facility] 2011:  IMPRESSION:  Severe mitral valvular stenosis.  No significant aortic  valve disease.  Normal coronary arteries.    Most recent device interrogation:  [facility] DEVICE FLOWSHEET 2019   Device History: Atrial Fibrillation;Third Degree AV Block   Device Type: Pacemaker   Mode: D D D R   Rate: 70   Presenting Rhythm: AsVp   Atrially Paced: (%) 19   Ventricularly  Paced: (%) 99   Mode Switching Details: AMS <1% of the time   Atrial Lead Threshold: (Volts) 0.75   Atrial Lead Sensing: (mV) 1.5   Atrial Lead Impedance: (Ohms) 530   Right Ventricular Lead Threshold: (Volts) 1.25   Right Ventricular Lead Impedance: (Ohms) 360   Battery Estimated Longevity: 9 yrs   Changes Made: None         Assessment and Clinical Decision Making:    History of rheumatic mitral valve stenosis s/p mechanical mitral valve, 2011.   -   On chronic warfarin.  -   No s/sx of valve dysfcn, last echo in 2016 with normal function.    Tricuspid valve disease s/p repair 2011.    PAF with CVA 2011   -   Embolic due to EFRAIN thrombus   -   S/p EFRAIN exclusion during surgery 4/2011.    Complete heart block   -   S/p [place] dual chamber PPM 4/2011  -   gen change 7/2018  -   99% V pacing.  -   Needs to schedule PPM check.    Dyslipidemia.   -    9/2019  -   Her diet sounds rich in animal proteins, per pt.  -   Educated on low cholesterol diet  -   Reduce atrovastatin to 20 mg, work on diet, recheck lipid in 3 months.    Will call patient lipid panel results and further recommendations.   If lipids are improved with diet, may consider d/c atorvastatin and repeat lipid panel off statin.    Zakia Palmer PA-C     [facility]  [facility]  [address] [place] [id number]  Phone: [phone]  Fax: [phone]    Collaborating Physician: Dr. Mahmood

## 2020-09-08 NOTE — PATIENT INSTRUCTIONS
1.  Cut back on red meat (beef) to 1-2 times per month.  Eat more fish and chicken, and lean pork.    2.  Increase fruits and vegetables.     3.  Limit fried food    4.  Limit animal proteins such as egg yolks and butter.

## 2020-09-23 ENCOUNTER — IMMUNIZATION (OUTPATIENT)
Dept: OCCUPATIONAL MEDICINE | Facility: CLINIC | Age: 30
End: 2020-09-23

## 2020-09-23 DIAGNOSIS — Z23 NEED FOR VACCINATION: ICD-10-CM

## 2020-09-23 PROCEDURE — 90686 IIV4 VACC NO PRSV 0.5 ML IM: CPT | Performed by: PREVENTIVE MEDICINE

## 2020-10-16 ENCOUNTER — TELEPHONE (OUTPATIENT)
Dept: VASCULAR LAB | Facility: MEDICAL CENTER | Age: 30
End: 2020-10-16

## 2020-10-16 NOTE — TELEPHONE ENCOUNTER
Left message for pt to have INR checked  Nina Filter, Clinical Pharmacist, CDE, CACP    
Alert and oriented to person, place and time, memory intact, behavior appropriate to situation, PERRL.

## 2020-10-27 ENCOUNTER — HOSPITAL ENCOUNTER (OUTPATIENT)
Dept: LAB | Facility: MEDICAL CENTER | Age: 30
End: 2020-10-27
Attending: NURSE PRACTITIONER
Payer: COMMERCIAL

## 2020-10-27 DIAGNOSIS — Z79.01 CHRONIC ANTICOAGULATION: ICD-10-CM

## 2020-10-27 LAB
INR PPP: 2.58 (ref 0.87–1.13)
PROTHROMBIN TIME: 28.5 SEC (ref 12–14.6)

## 2020-10-27 PROCEDURE — 85610 PROTHROMBIN TIME: CPT

## 2020-10-27 PROCEDURE — 36415 COLL VENOUS BLD VENIPUNCTURE: CPT

## 2020-10-28 ENCOUNTER — ANTICOAGULATION MONITORING (OUTPATIENT)
Dept: VASCULAR LAB | Facility: MEDICAL CENTER | Age: 30
End: 2020-10-28

## 2020-10-28 DIAGNOSIS — I05.0 RHEUMATIC MITRAL STENOSIS: ICD-10-CM

## 2020-10-28 DIAGNOSIS — I48.0 PAROXYSMAL ATRIAL FIBRILLATION (HCC): ICD-10-CM

## 2020-10-28 NOTE — PROGRESS NOTES
Anticoagulation Summary  As of 10/28/2020    INR goal:  2.5-3.5   TTR:  58.6 % (5.3 y)   INR used for dosin.58 (10/27/2020)   Warfarin maintenance plan:  5 mg (5 mg x 1) every Mon, Wed, Fri; 2.5 mg (5 mg x 0.5) all other days   Weekly warfarin total:  25 mg   Plan last modified:  Tan Helton, PharmD (2020)   Next INR check:  2020   Target end date:  Indefinite    Indications    Paroxysmal atrial fibrillation (HCC) [I48.0]  Mitral stenosis s/p Mechanical MVR [I05.0]             Anticoagulation Episode Summary     INR check location:  Outside Lab    Preferred lab:      Send INR reminders to:      Comments:  [facility]       Anticoagulation Care Providers     Provider Role Specialty Phone number    Bird Rodriguez D.O. Referring Family Medicine [phone]    Trey Dubon M.D. Referring Cardiac Surgery [phone]    Taj Allen, PharmD Responsible          Anticoagulation Patient Findings    Left voicemail message to report a therapeutic INR of 2.58.    Pt to continue with current warfarin dosing regimen as detailed above or give us a call if she is taking it differently.   Requested pt contact the clinic for any s/s of unusual bleeding, bruising, clotting or any changes to diet or medication.     Follow up in 4 weeks, to reduce risk of adverse events related to this high risk medication,  Warfarin.    Saida Escamilla, Pharmacy Intern

## 2020-11-05 DIAGNOSIS — I48.91 ATRIAL FIBRILLATION, UNSPECIFIED TYPE (HCC): ICD-10-CM

## 2020-11-06 RX ORDER — WARFARIN SODIUM 5 MG/1
TABLET ORAL
Qty: 90 TAB | Refills: 0 | Status: SHIPPED | OUTPATIENT
Start: 2020-11-06 | End: 2021-03-31

## 2020-12-15 ENCOUNTER — HOSPITAL ENCOUNTER (OUTPATIENT)
Dept: LAB | Facility: MEDICAL CENTER | Age: 30
End: 2020-12-15
Attending: NURSE PRACTITIONER
Payer: COMMERCIAL

## 2020-12-15 DIAGNOSIS — Z79.01 CHRONIC ANTICOAGULATION: ICD-10-CM

## 2020-12-15 LAB
INR PPP: 2.9 (ref 0.87–1.13)
PROTHROMBIN TIME: 31.2 SEC (ref 12–14.6)

## 2020-12-15 PROCEDURE — 36415 COLL VENOUS BLD VENIPUNCTURE: CPT

## 2020-12-15 PROCEDURE — 85610 PROTHROMBIN TIME: CPT

## 2020-12-16 ENCOUNTER — ANTICOAGULATION MONITORING (OUTPATIENT)
Dept: VASCULAR LAB | Facility: MEDICAL CENTER | Age: 30
End: 2020-12-16

## 2020-12-16 DIAGNOSIS — I05.0 RHEUMATIC MITRAL STENOSIS: ICD-10-CM

## 2020-12-16 DIAGNOSIS — I48.0 PAROXYSMAL ATRIAL FIBRILLATION (HCC): ICD-10-CM

## 2020-12-17 NOTE — PROGRESS NOTES
Anticoagulation Summary  As of 2020    INR goal:  2.5-3.5   TTR:  59.6 % (5.5 y)   INR used for dosin.90 (12/15/2020)   Warfarin maintenance plan:  5 mg (5 mg x 1) every Mon, Wed, Fri; 2.5 mg (5 mg x 0.5) all other days   Weekly warfarin total:  25 mg   Plan last modified:  Tan Helton, PharmD (2020)   Next INR check:  2021   Target end date:  Indefinite    Indications    Paroxysmal atrial fibrillation (HCC) [I48.0]  Mitral stenosis s/p Mechanical MVR [I05.0]             Anticoagulation Episode Summary     INR check location:  Outside Lab    Preferred lab:      Send INR reminders to:      Comments:  [facility]       Anticoagulation Care Providers     Provider Role Specialty Phone number    Bird Rodriguez D.O. Referring Family Medicine [phone]    Trey Dubon M.D. Referring Cardiac Surgery [phone]    Taj Allen, PharmD Responsible          Anticoagulation Patient Findings       Left voicemail message to report a   therapeutic INR of 2.9.  Pt to continue with current warfarin dosing regimen. Requested pt contact the clinic for any s/s of unusual bleeding, bruising, clotting or any changes to diet or medication.  Follow up in 6 weeks, to reduce the risk of adverse events related to this high risk medication, warfarin.    Nina Membreno, Clinical Pharmacist

## 2020-12-20 DIAGNOSIS — Z23 NEED FOR VACCINATION: ICD-10-CM

## 2020-12-29 ENCOUNTER — IMMUNIZATION (OUTPATIENT)
Dept: FAMILY PLANNING/WOMEN'S HEALTH CLINIC | Facility: IMMUNIZATION CENTER | Age: 30
End: 2020-12-29
Attending: FAMILY MEDICINE
Payer: COMMERCIAL

## 2020-12-29 DIAGNOSIS — Z23 NEED FOR VACCINATION: ICD-10-CM

## 2020-12-29 DIAGNOSIS — Z23 ENCOUNTER FOR VACCINATION: Primary | ICD-10-CM

## 2020-12-29 PROCEDURE — 91301 MODERNA SARS-COV-2 VACCINE: CPT

## 2020-12-29 PROCEDURE — 0011A MODERNA SARS-COV-2 VACCINE: CPT

## 2021-01-29 ENCOUNTER — TELEPHONE (OUTPATIENT)
Dept: CARDIOLOGY | Facility: MEDICAL CENTER | Age: 31
End: 2021-01-29

## 2021-01-29 ENCOUNTER — NON-PROVIDER VISIT (OUTPATIENT)
Dept: CARDIOLOGY | Facility: MEDICAL CENTER | Age: 31
End: 2021-01-29
Payer: COMMERCIAL

## 2021-01-29 DIAGNOSIS — Z95.0 PACEMAKER: ICD-10-CM

## 2021-01-29 PROCEDURE — 93280 PM DEVICE PROGR EVAL DUAL: CPT | Performed by: INTERNAL MEDICINE

## 2021-01-29 NOTE — TELEPHONE ENCOUNTER
Fyi: routine pmc-wnl.  AMS <1 % of the time since last pmc   Continues on warfarin  1-NSVT episode (9-beat run on 12-4-2020) asymptomatic  Offers no complaints  Battery longevity: 9 yrs  To be scanned into media

## 2021-01-30 PROCEDURE — 0012A MODERNA SARS-COV-2 VACCINE: CPT

## 2021-01-30 PROCEDURE — 91301 MODERNA SARS-COV-2 VACCINE: CPT

## 2021-01-31 ENCOUNTER — IMMUNIZATION (OUTPATIENT)
Dept: FAMILY PLANNING/WOMEN'S HEALTH CLINIC | Facility: IMMUNIZATION CENTER | Age: 31
End: 2021-01-31
Payer: COMMERCIAL

## 2021-01-31 DIAGNOSIS — Z23 ENCOUNTER FOR VACCINATION: Primary | ICD-10-CM

## 2021-02-10 ENCOUNTER — HOSPITAL ENCOUNTER (OUTPATIENT)
Dept: LAB | Facility: MEDICAL CENTER | Age: 31
End: 2021-02-10
Attending: NURSE PRACTITIONER
Payer: COMMERCIAL

## 2021-02-10 ENCOUNTER — HOSPITAL ENCOUNTER (OUTPATIENT)
Dept: LAB | Facility: MEDICAL CENTER | Age: 31
End: 2021-02-10
Attending: PHYSICIAN ASSISTANT
Payer: COMMERCIAL

## 2021-02-10 DIAGNOSIS — E78.5 HYPERLIPIDEMIA LDL GOAL <100: ICD-10-CM

## 2021-02-10 DIAGNOSIS — Z79.01 CHRONIC ANTICOAGULATION: ICD-10-CM

## 2021-02-10 LAB
CHOLEST SERPL-MCNC: 155 MG/DL (ref 100–199)
FASTING STATUS PATIENT QL REPORTED: NORMAL
HDLC SERPL-MCNC: 42 MG/DL
INR PPP: 2.89 (ref 0.87–1.13)
LDLC SERPL CALC-MCNC: 95 MG/DL
PROTHROMBIN TIME: 31.1 SEC (ref 12–14.6)
TRIGL SERPL-MCNC: 90 MG/DL (ref 0–149)

## 2021-02-10 PROCEDURE — 85610 PROTHROMBIN TIME: CPT

## 2021-02-10 PROCEDURE — 36415 COLL VENOUS BLD VENIPUNCTURE: CPT

## 2021-02-10 PROCEDURE — 80061 LIPID PANEL: CPT

## 2021-02-11 ENCOUNTER — TELEPHONE (OUTPATIENT)
Dept: CARDIOLOGY | Facility: MEDICAL CENTER | Age: 31
End: 2021-02-11

## 2021-02-11 ENCOUNTER — ANTICOAGULATION MONITORING (OUTPATIENT)
Dept: VASCULAR LAB | Facility: MEDICAL CENTER | Age: 31
End: 2021-02-11

## 2021-02-11 DIAGNOSIS — I48.0 PAROXYSMAL ATRIAL FIBRILLATION (HCC): ICD-10-CM

## 2021-02-11 DIAGNOSIS — I05.0 RHEUMATIC MITRAL STENOSIS: ICD-10-CM

## 2021-02-11 NOTE — TELEPHONE ENCOUNTER
Attempted to call pt, unable to reach.  Left detailed voicemail regarding PAC's recommendation and to return this call at her earliest convenience or to reply via Roombeatshart regarding pt decision.      Awaiting pt reply.

## 2021-02-11 NOTE — PROGRESS NOTES
Anticoagulation Summary  As of 2021    INR goal:  2.5-3.5   TTR:  60.7 % (5.6 y)   INR used for dosin.89 (2/10/2021)   Warfarin maintenance plan:  5 mg (5 mg x 1) every Mon, Wed, Fri; 2.5 mg (5 mg x 0.5) all other days   Weekly warfarin total:  25 mg   Plan last modified:  Nina TRUJILLO Filter (2020)   Next INR check:  3/11/2021   Target end date:  Indefinite    Indications    Paroxysmal atrial fibrillation (HCC) [I48.0]  Mitral stenosis s/p Mechanical MVR [I05.0]             Anticoagulation Episode Summary     INR check location:  Outside Lab    Preferred lab:      Send INR reminders to:      Comments:  [facility]       Anticoagulation Care Providers     Provider Role Specialty Phone number    Bird Rodriguez D.O. Referring Family Medicine [phone]    Trey Dubon M.D. Referring Cardiac Surgery [phone]    Taj Allen, PharmD Responsible          Anticoagulation Patient Findings      Left patient a message to report a Therapeutic INR of 2.89.  Pt to continue current dosing regimen of 5 mg MWF and 2.5 mg AOD. Requested patient to contact the clinic for any s/s of unusual bleeding, bruising or any changes to diet or medication. Follow up INR in 4  week(s).    Gretchen Mayen, Pharmacy Intern

## 2021-02-11 NOTE — TELEPHONE ENCOUNTER
"----- Message from Zakia Palmer P.A.-C. sent at 2/11/2021  9:58 AM PST -----  ANAHI Marr sent Sameer a Jaiden message yesterday.     \"The results of your lipid panel look really good!  Your LDL is 95 (goal < 100).   If you want to continue the current dose of Lipitor that would be fine, or if you want to try to cut back to 10 mg or stop all together, that's something we can consider.\"    I did not hear back from her yet.  Would you mind trying to call her.    Thanks,   Zakia Palmer PA-C  2/11/2021      ----- Message -----  From: Justa Henry R.N.  Sent: 2/11/2021   9:00 AM PST  To: Zakia Palmer P.A.-C.    No FV scheduled, thank you!    "

## 2021-03-09 ENCOUNTER — HOSPITAL ENCOUNTER (OUTPATIENT)
Dept: LAB | Facility: MEDICAL CENTER | Age: 31
End: 2021-03-09
Attending: NURSE PRACTITIONER
Payer: COMMERCIAL

## 2021-03-09 ENCOUNTER — ANTICOAGULATION MONITORING (OUTPATIENT)
Dept: MEDICAL GROUP | Facility: PHYSICIAN GROUP | Age: 31
End: 2021-03-09

## 2021-03-09 DIAGNOSIS — Z79.01 CHRONIC ANTICOAGULATION: ICD-10-CM

## 2021-03-09 DIAGNOSIS — I48.0 PAROXYSMAL ATRIAL FIBRILLATION (HCC): ICD-10-CM

## 2021-03-09 DIAGNOSIS — I05.0 RHEUMATIC MITRAL STENOSIS: ICD-10-CM

## 2021-03-09 LAB
INR PPP: 1.82 (ref 0.87–1.13)
PROTHROMBIN TIME: 21.7 SEC (ref 12–14.6)

## 2021-03-09 PROCEDURE — 36415 COLL VENOUS BLD VENIPUNCTURE: CPT

## 2021-03-09 PROCEDURE — 85610 PROTHROMBIN TIME: CPT

## 2021-03-10 ENCOUNTER — TELEPHONE (OUTPATIENT)
Dept: VASCULAR LAB | Facility: MEDICAL CENTER | Age: 31
End: 2021-03-10

## 2021-03-10 NOTE — PROGRESS NOTES
Anticoagulation Summary  As of 3/9/2021    INR goal:  2.5-3.5   TTR:  60.4 % (5.7 y)   INR used for dosin.82 (3/9/2021)   Warfarin maintenance plan:  5 mg (5 mg x 1) every Mon, Wed, Fri; 2.5 mg (5 mg x 0.5) all other days   Weekly warfarin total:  25 mg   Plan last modified:  Nina Membreno (2020)   Next INR check:  3/16/2021   Target end date:  Indefinite    Indications    Paroxysmal atrial fibrillation (HCC) [I48.0]  Mitral stenosis s/p Mechanical MVR [I05.0]             Anticoagulation Episode Summary     INR check location:  Outside Lab    Preferred lab:      Send INR reminders to:      Comments:  [facility]       Anticoagulation Care Providers     Provider Role Specialty Phone number    Bird Rodriguez D.O. Referring Family Medicine [phone]    Trey Dubon M.D. Referring Cardiac Surgery [phone]    Taj Allen PharmD Responsible          Anticoagulation Patient Findings    Left voicemail message to report a sub-therapeutic INR of 1.82.    Instructed patient to BOLUS x 1 tonight with 5 mg and then resume current dosing regimen as noted above.  Requested pt contact the clinic for any s/s of unusual bleeding, bruising, clotting or any changes to diet or medication.   FU 1 week.    This plan was discussed with Nina Membreno PharmD, CDE, CACP.    Tej Choi, Pharmacy Intern

## 2021-03-10 NOTE — TELEPHONE ENCOUNTER
Attempted to return call regarding recent INR. [facility] for pt to return call.      ----- Message from Ligia Carbajal sent at 3/10/2021  9:15 AM PST -----  Regarding: Patient Call  Pt called back to speak with a Pharmacist about most recent INR results.

## 2021-03-17 ENCOUNTER — HOSPITAL ENCOUNTER (OUTPATIENT)
Dept: LAB | Facility: MEDICAL CENTER | Age: 31
End: 2021-03-17
Attending: NURSE PRACTITIONER
Payer: COMMERCIAL

## 2021-03-17 ENCOUNTER — ANTICOAGULATION MONITORING (OUTPATIENT)
Dept: VASCULAR LAB | Facility: MEDICAL CENTER | Age: 31
End: 2021-03-17

## 2021-03-17 DIAGNOSIS — I05.0 RHEUMATIC MITRAL STENOSIS: ICD-10-CM

## 2021-03-17 DIAGNOSIS — Z79.01 CHRONIC ANTICOAGULATION: ICD-10-CM

## 2021-03-17 DIAGNOSIS — I48.0 PAROXYSMAL ATRIAL FIBRILLATION (HCC): ICD-10-CM

## 2021-03-17 LAB
INR PPP: 2.28 (ref 0.87–1.13)
PROTHROMBIN TIME: 25.9 SEC (ref 12–14.6)

## 2021-03-17 PROCEDURE — 85610 PROTHROMBIN TIME: CPT

## 2021-03-17 PROCEDURE — 36415 COLL VENOUS BLD VENIPUNCTURE: CPT

## 2021-03-17 NOTE — PROGRESS NOTES
Anticoagulation Summary  As of 3/17/2021    INR goal:  2.5-3.5   TTR:  60.1 % (5.7 y)   INR used for dosin.28 (3/17/2021)   Warfarin maintenance plan:  2.5 mg (5 mg x 0.5) every Tue, Thu, Sat; 5 mg (5 mg x 1) all other days   Weekly warfarin total:  27.5 mg   Plan last modified:  Hedy Key PharmD (3/18/2021)   Next INR check:  3/24/2021   Target end date:  Indefinite    Indications    Paroxysmal atrial fibrillation (HCC) [I48.0]  Mitral stenosis s/p Mechanical MVR [I05.0]             Anticoagulation Episode Summary     INR check location:  Outside Lab    Preferred lab:      Send INR reminders to:      Comments:  [facility]       Anticoagulation Care Providers     Provider Role Specialty Phone number    Bird Rodriguez D.O. Referring Family Medicine [phone]    Trey Dubon M.D. Referring Cardiac Surgery [phone]    Taj Allen, PharmD Responsible          Anticoagulation Patient Findings      Left patient a message to report a Subtherapeutic INR of 2.28.  Pt to bolus one time with 7.5 mg, then continue normal dosing noted above. Pending next weeks result, patient may need increase in weekly dose given two consecutive subtherapeutic INRs. Unable to verbally verify dosing and understanding with patient but requested patient to contact the clinic at [phone] for any s/s of unusual bleeding/bruising/clotting, any changes to diet or medications, or any questions and/or concerns. Follow up INR in 1  week(s).    Consulted with PharmD prior to making recommendation.     Gretchen Mayen, Pharmacy Intern    ADDENDUM 3/18/21: Received call from pt - she has been consuming more vitamin K and will continue to do so. Pt did bolus with 7.5 mg last night. Will also increase her weekly regimen to 2.5 mg on Sun/Tues/Thurs, 5 mg AOD. Hedy Key, AgataD

## 2021-03-29 ENCOUNTER — HOSPITAL ENCOUNTER (OUTPATIENT)
Dept: LAB | Facility: MEDICAL CENTER | Age: 31
End: 2021-03-29
Attending: NURSE PRACTITIONER
Payer: COMMERCIAL

## 2021-03-29 ENCOUNTER — ANTICOAGULATION MONITORING (OUTPATIENT)
Dept: MEDICAL GROUP | Facility: MEDICAL CENTER | Age: 31
End: 2021-03-29

## 2021-03-29 DIAGNOSIS — I05.0 RHEUMATIC MITRAL STENOSIS: ICD-10-CM

## 2021-03-29 DIAGNOSIS — I48.0 PAROXYSMAL ATRIAL FIBRILLATION (HCC): ICD-10-CM

## 2021-03-29 DIAGNOSIS — Z79.01 CHRONIC ANTICOAGULATION: ICD-10-CM

## 2021-03-29 LAB
INR PPP: 2.89 (ref 0.87–1.13)
PROTHROMBIN TIME: 31.1 SEC (ref 12–14.6)

## 2021-03-29 PROCEDURE — 36415 COLL VENOUS BLD VENIPUNCTURE: CPT

## 2021-03-29 PROCEDURE — 85610 PROTHROMBIN TIME: CPT

## 2021-03-29 NOTE — PROGRESS NOTES
OP Anticoagulation Service Note    Date: 3/29/2021    Anticoagulation Summary  As of 3/29/2021    INR goal:  2.5-3.5   TTR:  60.2 % (5.8 y)   INR used for dosin.89 (3/29/2021)   Warfarin maintenance plan:  2.5 mg (5 mg x 0.5) every Tue, Thu, Sat; 5 mg (5 mg x 1) all other days   Weekly warfarin total:  27.5 mg   Plan last modified:  Tan Helton, PharmD (3/29/2021)   Next INR check:  2021   Target end date:  Indefinite    Indications    Paroxysmal atrial fibrillation (HCC) [I48.0]  Mitral stenosis s/p Mechanical MVR [I05.0]             Anticoagulation Episode Summary     INR check location:  Outside Lab    Preferred lab:      Send INR reminders to:      Comments:        Anticoagulation Care Providers     Provider Role Specialty Phone number    Bird Rodriguez D.O. Referring Family Medicine [phone]    Trey Dubon M.D. Referring Cardiac Surgery [phone]    Taj Allen, PharmD Responsible          Anticoagulation Patient Findings      HPI:     This appointment was conducted over the phone today with the patient and/or caregiver regarding their anticoagulant.    The reason for today's call is to prevent morbidity and mortality from a blood clot and/or stroke and to reduce the risk of bleeding while on a anticoagulant.       Assessment:     • INR  therapeutic.   • Confirmed warfarin dosing regimen: Yes  • Interval Changes with foods rich in vitamin K: No  • Interval Changes in ETOH or cranberries:   No  • Interval Changes in smoking status:  No  • S/S of bleeding or bruising:  No  • Signs/symptoms  thrombosis since the last appt:  No  • Any upcoming procedures that require stopping warfarin and/or using bridge therapy: None  • Interval Changes in medication:  No   • Is pt on antiplatelet therapy: no      Current Outpatient Medications:   •  warfarin, TAKE ONE-HALF TO ONE TABLET BY MOUTH ONCE DAILY AS DIRECTED BY THE [facility].  •  metoprolol SR, 25 mg, Oral, DAILY  •   atorvastatin, 20 mg, Oral, DAILY  •  Cholecalciferol (D3 ADULT PO), Take  by mouth.  •  therapeutic multivitamin-minerals, 1 tablet, Oral, DAILY      Plan:     • Continue the same warfarin dose, as noted above.       Follow-up:     • Our protocol suggests we test in 3 weeks.    • This decision was made using shared decision making with the pt and benefits vs risks were discussed.      Additional information discussed with patient:     • Pt educated to contact our clinic with any changes in medications or s/s of bleeding or thrombosis.  • Education was provided today regarding tips to reduce their bleed risk and dietary constraints while on a anticoagulant.    National recommendations regarding anticoagulation therapy:     The CHEST guidelines recommends frequent INR monitoring at regular intervals (a few days up to a max of 12 weeks) to ensure patients are on the proper dose of warfarin, and patients are not having any complications from therapy.  INRs can dramatically change over a short time period due to diet, medications, and medical conditions.       Tan Helton, PharmD, MS, BCACP, C  [facility]  Phone [phone] fax [phone]    This note was created using voice recognition software (Dragon). The accuracy of the dictation is limited by the abilities of the software. I have reviewed the note prior to signing, however some errors in grammar and context are still possible. If you have any questions related to this note please do not hesitate to contact our office.

## 2021-03-31 DIAGNOSIS — I48.91 ATRIAL FIBRILLATION, UNSPECIFIED TYPE (HCC): ICD-10-CM

## 2021-03-31 RX ORDER — WARFARIN SODIUM 5 MG/1
TABLET ORAL
Qty: 90 TABLET | Refills: 1 | Status: SHIPPED | OUTPATIENT
Start: 2021-03-31 | End: 2021-11-23 | Stop reason: SDUPTHER

## 2021-04-22 ENCOUNTER — ANTICOAGULATION MONITORING (OUTPATIENT)
Dept: VASCULAR LAB | Facility: MEDICAL CENTER | Age: 31
End: 2021-04-22

## 2021-04-22 ENCOUNTER — ANTICOAGULATION MONITORING (OUTPATIENT)
Dept: MEDICAL GROUP | Facility: MEDICAL CENTER | Age: 31
End: 2021-04-22

## 2021-04-22 ENCOUNTER — HOSPITAL ENCOUNTER (OUTPATIENT)
Dept: LAB | Facility: MEDICAL CENTER | Age: 31
End: 2021-04-22
Attending: NURSE PRACTITIONER
Payer: COMMERCIAL

## 2021-04-22 DIAGNOSIS — Z79.01 CHRONIC ANTICOAGULATION: ICD-10-CM

## 2021-04-22 DIAGNOSIS — I48.0 PAROXYSMAL ATRIAL FIBRILLATION (HCC): ICD-10-CM

## 2021-04-22 DIAGNOSIS — I05.0 RHEUMATIC MITRAL STENOSIS: ICD-10-CM

## 2021-04-22 LAB
INR PPP: 2.87 (ref 0.87–1.13)
PROTHROMBIN TIME: 30.9 SEC (ref 12–14.6)

## 2021-04-22 PROCEDURE — 36415 COLL VENOUS BLD VENIPUNCTURE: CPT

## 2021-04-22 PROCEDURE — 85610 PROTHROMBIN TIME: CPT

## 2021-04-22 NOTE — PROGRESS NOTES
Anticoagulation Summary  As of 2021    INR goal:  2.5-3.5   TTR:  60.6 % (5.8 y)   INR used for dosin.87 (2021)   Warfarin maintenance plan:  2.5 mg (5 mg x 0.5) every Tue, Thu, Sat; 5 mg (5 mg x 1) all other days   Weekly warfarin total:  27.5 mg   Plan last modified:  Agata AndrewsD (3/29/2021)   Next INR check:  6/3/2021   Target end date:  Indefinite    Indications    Paroxysmal atrial fibrillation (HCC) [I48.0]  Mitral stenosis s/p Mechanical MVR [I05.0]             Anticoagulation Episode Summary     INR check location:  Outside Lab    Preferred lab:      Send INR reminders to:      Comments:        Anticoagulation Care Providers     Provider Role Specialty Phone number    Bird Rodriguez D.O. Referring Family Medicine [phone]    Trey Dubon M.D. Referring Cardiac Surgery [phone]    Taj Allen, PharmD Responsible          Anticoagulation Patient Findings          Left voicemail message to report a  therapeutic INR of 2.87.  Pt to continue with current warfarin dosing regimen. Requested pt contact the clinic for any s/s of unusual bleeding, bruising, clotting or any changes to diet or medication.  Follow up in 6   weeks, to reduce the risk of adverse events related to this high risk medication, warfarin.    Nina Membreno, Clinical Pharmacist

## 2021-06-03 ENCOUNTER — HOSPITAL ENCOUNTER (OUTPATIENT)
Dept: LAB | Facility: MEDICAL CENTER | Age: 31
End: 2021-06-03
Attending: NURSE PRACTITIONER
Payer: COMMERCIAL

## 2021-06-03 DIAGNOSIS — Z79.01 CHRONIC ANTICOAGULATION: ICD-10-CM

## 2021-06-03 LAB
INR PPP: 3.3 (ref 0.87–1.13)
PROTHROMBIN TIME: 34.6 SEC (ref 12–14.6)

## 2021-06-03 PROCEDURE — 85610 PROTHROMBIN TIME: CPT

## 2021-06-03 PROCEDURE — 36415 COLL VENOUS BLD VENIPUNCTURE: CPT

## 2021-06-04 ENCOUNTER — ANTICOAGULATION MONITORING (OUTPATIENT)
Dept: MEDICAL GROUP | Facility: PHYSICIAN GROUP | Age: 31
End: 2021-06-04

## 2021-06-04 ENCOUNTER — ANTICOAGULATION MONITORING (OUTPATIENT)
Dept: VASCULAR LAB | Facility: MEDICAL CENTER | Age: 31
End: 2021-06-04

## 2021-06-04 DIAGNOSIS — I48.0 PAROXYSMAL ATRIAL FIBRILLATION (HCC): ICD-10-CM

## 2021-06-04 NOTE — PROGRESS NOTES
Anticoagulation Summary  As of 6/4/2021    INR goal:  2.5-3.5   TTR:  61.4 % (5.9 y)   INR used for dosing:  3.30 (6/3/2021)   Warfarin maintenance plan:  2.5 mg (5 mg x 0.5) every Tue, Thu, Sat; 5 mg (5 mg x 1) all other days   Weekly warfarin total:  27.5 mg   Plan last modified:  Nina M Filter (4/22/2021)   Next INR check:     Target end date:  Indefinite    Indications    Paroxysmal atrial fibrillation (HCC) [I48.0]  Mitral stenosis s/p Mechanical MVR [I05.0]             Anticoagulation Episode Summary     INR check location:  Outside Lab    Preferred lab:      Send INR reminders to:      Comments:        Anticoagulation Care Providers     Provider Role Specialty Phone number    Bird Rodriguez D.O. Referring Family Medicine [phone]    Trey Dubon M.D. Referring Cardiac Surgery [phone]    Taj Allen, PharmD Responsible          Anticoagulation Patient Findings      Left voicemail message to report a therapeutic INR of 3.3.       Pt to continue with current warfarin dosing regimen. Requested pt contact the clinic for any s/s of unusual bleeding, bruising, clotting or any changes to diet or medication.    FU INR in 8 week(s).    Lin Galarza, Pharmacy Intern

## 2021-07-08 ENCOUNTER — HOSPITAL ENCOUNTER (OUTPATIENT)
Dept: LAB | Facility: MEDICAL CENTER | Age: 31
End: 2021-07-08
Attending: NURSE PRACTITIONER
Payer: COMMERCIAL

## 2021-07-08 ENCOUNTER — ANTICOAGULATION MONITORING (OUTPATIENT)
Dept: MEDICAL GROUP | Facility: MEDICAL CENTER | Age: 31
End: 2021-07-08

## 2021-07-08 DIAGNOSIS — Z79.01 CHRONIC ANTICOAGULATION: ICD-10-CM

## 2021-07-08 DIAGNOSIS — I48.0 PAROXYSMAL ATRIAL FIBRILLATION (HCC): ICD-10-CM

## 2021-07-08 LAB
INR PPP: 2.87 (ref 0.87–1.13)
PROTHROMBIN TIME: 29.2 SEC (ref 12–14.6)

## 2021-07-08 PROCEDURE — 36415 COLL VENOUS BLD VENIPUNCTURE: CPT

## 2021-07-08 PROCEDURE — 85610 PROTHROMBIN TIME: CPT

## 2021-07-09 NOTE — PROGRESS NOTES
OP Telephone Anticoagulation Service Note    Date: 2021      Anticoagulation Summary  As of 2021    INR goal:  2.5-3.5   TTR:  62.0 % (6 y)   INR used for dosin.87 (2021)   Warfarin maintenance plan:  2.5 mg (5 mg x 0.5) every Tue, Thu, Sat; 5 mg (5 mg x 1) all other days   Weekly warfarin total:  27.5 mg   Plan last modified:  Nina M Filter (2021)   Next INR check:  2021   Target end date:  Indefinite    Indications    Paroxysmal atrial fibrillation (HCC) [I48.0]  Mitral stenosis s/p Mechanical MVR [I05.0]             Anticoagulation Episode Summary     INR check location:  Outside Lab    Preferred lab:      Send INR reminders to:      Comments:        Anticoagulation Care Providers     Provider Role Specialty Phone number    Bird Rodriguez D.O. Referring Family Medicine [phone]    Trey Dubon M.D. Referring Cardiac Surgery [phone]    Taj Allen, PharmD Responsible          Anticoagulation Patient Findings      INR therapeutic at 2.87.  Left voicemail message for pt.  Verified regimen.  Instructed pt to continue on with current regimen.  Told pt to call if any s/s of bleeding or medication changes.  Check INR in 9 week(s).  Instructed pt to call clinic at [phone] if there are any questions.    Jono Curry, AgataD

## 2021-08-30 ENCOUNTER — DOCUMENTATION (OUTPATIENT)
Dept: VASCULAR LAB | Facility: MEDICAL CENTER | Age: 31
End: 2021-08-30

## 2021-08-30 DIAGNOSIS — I48.0 PAROXYSMAL ATRIAL FIBRILLATION (HCC): ICD-10-CM

## 2021-08-30 NOTE — PROGRESS NOTES
Received a call from April requesting an order for her upcoming INR draw to be added to the blood work she is already having drawn this week. Placed ordered.     Called her back and left a voicemail encouraging her to be seen in person if possible at one of our clinic locations.    Raymond Ramirez, AgataD

## 2021-08-31 ENCOUNTER — HOSPITAL ENCOUNTER (OUTPATIENT)
Dept: LAB | Facility: MEDICAL CENTER | Age: 31
End: 2021-08-31
Attending: INTERNAL MEDICINE
Payer: COMMERCIAL

## 2021-08-31 ENCOUNTER — ANTICOAGULATION MONITORING (OUTPATIENT)
Dept: MEDICAL GROUP | Facility: PHYSICIAN GROUP | Age: 31
End: 2021-08-31

## 2021-08-31 DIAGNOSIS — I48.0 PAROXYSMAL ATRIAL FIBRILLATION (HCC): ICD-10-CM

## 2021-08-31 LAB
INR PPP: 3.19 (ref 0.87–1.13)
PROTHROMBIN TIME: 31.7 SEC (ref 12–14.6)

## 2021-08-31 PROCEDURE — 85610 PROTHROMBIN TIME: CPT

## 2021-08-31 PROCEDURE — 36415 COLL VENOUS BLD VENIPUNCTURE: CPT

## 2021-08-31 NOTE — PROGRESS NOTES
Anticoagulation Summary  As of 8/31/2021    INR goal:  2.5-3.5   TTR:  62.9 % (6.2 y)   INR used for dosing:  3.19 (8/31/2021)   Warfarin maintenance plan:  2.5 mg (5 mg x 0.5) every Tue, Thu, Sat; 5 mg (5 mg x 1) all other days   Weekly warfarin total:  27.5 mg   Plan last modified:  Nina TRUJILLO Filter (4/22/2021)   Next INR check:  10/12/2021   Target end date:  Indefinite    Indications    Paroxysmal atrial fibrillation (HCC) [I48.0]  Mitral stenosis s/p Mechanical MVR [I05.0]             Anticoagulation Episode Summary     INR check location:  Outside Lab    Preferred lab:      Send INR reminders to:      Comments:        Anticoagulation Care Providers     Provider Role Specialty Phone number    Bird Rodriguez D.O. Referring Family Medicine [phone]    Trey Dubon M.D. Referring Cardiac Surgery [phone]    Taj Allen, PharmD Responsible          Anticoagulation Patient Findings  Patient Findings     Negatives:  Signs/symptoms of thrombosis, Signs/symptoms of bleeding, Laboratory test error suspected, Change in health, Change in alcohol use, Change in activity, Upcoming invasive procedure, Emergency department visit, Upcoming dental procedure, Missed doses, Extra doses, Change in medications, Change in diet/appetite, Hospital admission, Bruising, Other complaints           Spoke with patient today regarding a therapeutic INR of 3.19.  Patient denies any signs/symptoms of bruising or bleeding or any changes in diet and medications.  Instructed patient to call clinic with any questions or concerns.    Pt is to continue with current warfarin dosing regimen.    Follow up in 6 weeks, to reduce risk of adverse events related to this high risk medication,  Warfarin.    Jose Arevalo, Pharmacy Intern

## 2021-09-07 ENCOUNTER — APPOINTMENT (OUTPATIENT)
Dept: DERMATOLOGY | Facility: IMAGING CENTER | Age: 31
End: 2021-09-07

## 2021-09-22 ENCOUNTER — IMMUNIZATION (OUTPATIENT)
Dept: OCCUPATIONAL MEDICINE | Facility: CLINIC | Age: 31
End: 2021-09-22
Payer: COMMERCIAL

## 2021-09-22 DIAGNOSIS — Z23 NEED FOR VACCINATION: Primary | ICD-10-CM

## 2021-09-22 PROCEDURE — 90686 IIV4 VACC NO PRSV 0.5 ML IM: CPT | Performed by: PREVENTIVE MEDICINE

## 2021-10-01 NOTE — TELEPHONE ENCOUNTER
I am not sure why this message is in the chart.  The patient is not on thyroid medication.  Is this the correct chart?   [Negative] : Heme/Lymph

## 2021-10-12 ENCOUNTER — NON-PROVIDER VISIT (OUTPATIENT)
Dept: CARDIOLOGY | Facility: MEDICAL CENTER | Age: 31
End: 2021-10-12
Payer: COMMERCIAL

## 2021-10-12 ENCOUNTER — HOSPITAL ENCOUNTER (OUTPATIENT)
Dept: LAB | Facility: MEDICAL CENTER | Age: 31
End: 2021-10-12
Attending: NURSE PRACTITIONER
Payer: COMMERCIAL

## 2021-10-12 ENCOUNTER — APPOINTMENT (OUTPATIENT)
Dept: DERMATOLOGY | Facility: IMAGING CENTER | Age: 31
End: 2021-10-12

## 2021-10-12 DIAGNOSIS — I48.0 PAROXYSMAL ATRIAL FIBRILLATION (HCC): ICD-10-CM

## 2021-10-12 DIAGNOSIS — Z95.0 PACEMAKER: ICD-10-CM

## 2021-10-12 DIAGNOSIS — I44.2 COMPLETE HEART BLOCK (HCC): ICD-10-CM

## 2021-10-12 PROCEDURE — 93280 PM DEVICE PROGR EVAL DUAL: CPT | Performed by: INTERNAL MEDICINE

## 2021-10-28 ENCOUNTER — HOSPITAL ENCOUNTER (OUTPATIENT)
Dept: LAB | Facility: MEDICAL CENTER | Age: 31
End: 2021-10-28
Attending: NURSE PRACTITIONER
Payer: COMMERCIAL

## 2021-10-28 DIAGNOSIS — E78.5 HYPERLIPIDEMIA LDL GOAL <100: ICD-10-CM

## 2021-10-28 DIAGNOSIS — I10 ESSENTIAL HYPERTENSION: ICD-10-CM

## 2021-10-28 DIAGNOSIS — I48.0 PAROXYSMAL ATRIAL FIBRILLATION (HCC): ICD-10-CM

## 2021-10-28 LAB
INR PPP: 3.47 (ref 0.87–1.13)
PROTHROMBIN TIME: 33.8 SEC (ref 12–14.6)

## 2021-10-28 PROCEDURE — 85610 PROTHROMBIN TIME: CPT

## 2021-10-28 PROCEDURE — 36415 COLL VENOUS BLD VENIPUNCTURE: CPT

## 2021-11-01 ENCOUNTER — ANTICOAGULATION MONITORING (OUTPATIENT)
Dept: VASCULAR LAB | Facility: MEDICAL CENTER | Age: 31
End: 2021-11-01

## 2021-11-01 DIAGNOSIS — I48.0 PAROXYSMAL ATRIAL FIBRILLATION (HCC): ICD-10-CM

## 2021-11-01 NOTE — PROGRESS NOTES
Anticoagulation Summary  As of 11/1/2021    INR goal:  2.5-3.5   TTR:  63.8 % (6.3 y)   INR used for dosing:  3.47 (10/28/2021)   Warfarin maintenance plan:  2.5 mg (5 mg x 0.5) every Tue, Thu, Sat; 5 mg (5 mg x 1) all other days   Weekly warfarin total:  27.5 mg   Plan last modified:  Nina TRUJILLO Filter (4/22/2021)   Next INR check:  12/27/2021   Target end date:  Indefinite    Indications    Paroxysmal atrial fibrillation (HCC) [I48.0]  Mitral stenosis s/p Mechanical MVR [I05.0]             Anticoagulation Episode Summary     INR check location:  Outside Lab    Preferred lab:      Send INR reminders to:      Comments:        Anticoagulation Care Providers     Provider Role Specialty Phone number    Bird Rodriguez D.O. Referring Family Medicine [phone]    Trey Dubon M.D. Referring Thoracic Surgery (Cardiothoracic Vascular Surgery) [phone]    Taj Allen, PharmD Responsible          Anticoagulation Patient Findings      Left voicemail message to report a therapeutic INR of 3.47     Pt to continue with current warfarin dosing regimen. Requested pt contact the clinic for any s/s of unusual bleeding, bruising, clotting or any changes to diet or medication.    FU INR in 8 week(s).    Hedy Key, PharmD

## 2021-11-03 RX ORDER — ATORVASTATIN CALCIUM 20 MG/1
20 TABLET, FILM COATED ORAL EVERY EVENING
Qty: 90 TABLET | Refills: 0 | Status: SHIPPED | OUTPATIENT
Start: 2021-11-03 | End: 2022-02-07 | Stop reason: SDUPTHER

## 2021-11-03 RX ORDER — METOPROLOL SUCCINATE 25 MG/1
25 TABLET, EXTENDED RELEASE ORAL DAILY
Qty: 90 TABLET | Refills: 0 | Status: SHIPPED | OUTPATIENT
Start: 2021-11-03 | End: 2022-02-07

## 2021-11-04 NOTE — TELEPHONE ENCOUNTER
Medication Refill  Mike Dominguez Ass't  You Yesterday (6:05 AM)     PP of . No ADD on schedule so Rx prepped under ADA. Patient has an appt 12/9/2021 with MARISSA. Thank you     Routing comment      Medication sent to preferred pharmacy as requested.

## 2021-11-23 DIAGNOSIS — I48.91 ATRIAL FIBRILLATION, UNSPECIFIED TYPE (HCC): ICD-10-CM

## 2021-11-23 RX ORDER — WARFARIN SODIUM 5 MG/1
TABLET ORAL
Qty: 90 TABLET | Refills: 1 | Status: SHIPPED | OUTPATIENT
Start: 2021-11-23 | End: 2022-01-19

## 2021-12-07 ENCOUNTER — APPOINTMENT (OUTPATIENT)
Dept: DERMATOLOGY | Facility: IMAGING CENTER | Age: 31
End: 2021-12-07

## 2021-12-09 ENCOUNTER — OFFICE VISIT (OUTPATIENT)
Dept: CARDIOLOGY | Facility: MEDICAL CENTER | Age: 31
End: 2021-12-09
Payer: COMMERCIAL

## 2021-12-09 VITALS
OXYGEN SATURATION: 100 % | BODY MASS INDEX: 27.6 KG/M2 | DIASTOLIC BLOOD PRESSURE: 62 MMHG | RESPIRATION RATE: 16 BRPM | HEART RATE: 96 BPM | WEIGHT: 150 LBS | HEIGHT: 62 IN | SYSTOLIC BLOOD PRESSURE: 100 MMHG

## 2021-12-09 DIAGNOSIS — I07.1 TRICUSPID VALVE INSUFFICIENCY, UNSPECIFIED ETIOLOGY: ICD-10-CM

## 2021-12-09 DIAGNOSIS — I48.0 PAROXYSMAL ATRIAL FIBRILLATION (HCC): ICD-10-CM

## 2021-12-09 DIAGNOSIS — Z79.01 CHRONIC ANTICOAGULATION: ICD-10-CM

## 2021-12-09 DIAGNOSIS — I05.0 MITRAL VALVE STENOSIS, UNSPECIFIED ETIOLOGY: ICD-10-CM

## 2021-12-09 DIAGNOSIS — Z95.0 PACEMAKER: ICD-10-CM

## 2021-12-09 DIAGNOSIS — E78.5 DYSLIPIDEMIA: ICD-10-CM

## 2021-12-09 PROCEDURE — 99215 OFFICE O/P EST HI 40 MIN: CPT | Performed by: INTERNAL MEDICINE

## 2021-12-09 ASSESSMENT — ENCOUNTER SYMPTOMS
WEIGHT LOSS: 0
NAUSEA: 0
ABDOMINAL PAIN: 0
DIZZINESS: 0
FEVER: 0
EYES NEGATIVE: 1
DOUBLE VISION: 0
COUGH: 0
SHORTNESS OF BREATH: 0
VOMITING: 0
FOCAL WEAKNESS: 0
BLURRED VISION: 0
NEUROLOGICAL NEGATIVE: 1
HEADACHES: 0
MUSCULOSKELETAL NEGATIVE: 1
RESPIRATORY NEGATIVE: 1
PALPITATIONS: 0
GASTROINTESTINAL NEGATIVE: 1
BRUISES/BLEEDS EASILY: 0
DEPRESSION: 0
NERVOUS/ANXIOUS: 0
MYALGIAS: 0
CARDIOVASCULAR NEGATIVE: 1
CHILLS: 0
PSYCHIATRIC NEGATIVE: 1
WEAKNESS: 0
CONSTITUTIONAL NEGATIVE: 1
CLAUDICATION: 0

## 2021-12-09 ASSESSMENT — FIBROSIS 4 INDEX: FIB4 SCORE: 0.6

## 2021-12-09 NOTE — PROGRESS NOTES
Chief Complaint   Patient presents with   • Hyperlipidemia   • Atrial Fibrillation   • Dyslipidemia       Subjective     April Demi Boston is a 31 y.o. female who presents today for new patient establishment for mitral valve replacement, tricuspid valve repair, chronic anticoagulation therapy, atrial fibrillation, pacemaker placement.     Since the patient's last visit on 04/18/19 with Anna Golden, she has been doing well clinically. She denies fatigue, shortness of breath, dyspnea on exertion, chest pain, dizziness or syncope. She works out regularly.    Past Medical History:   Diagnosis Date   • Anemia 4/17/2011   • CHF (congestive heart failure) (HCC)    • Chronic anticoagulation    • CVA (cerebral infarction) 2011   • Elbow fracture    • History of atrial fibrillation    • Hyperlipidemia    • Left atrial thrombus    • Migraine     occasional   • Mitral stenosis     s/p mechanical MVR on coumadin   • Pacemaker     [employer] for 3rd degree AVB   • Pulmonary hypertension (HCC)    • Third degree heart block (HCC)    • Tricuspid regurgitation     s/p repair     Past Surgical History:   Procedure Laterality Date   • ELBOW ORIF Right 1/26/2016    Procedure: ELBOW ORIF olecranon;  Surgeon: Fausto Nunez M.D.;  Location: SURGERY [facility];  Service:    • PELVISCOPY  9/6/2011    Performed by SHERRY HEREDIA at SURGERY SAME DAY [facility] ORS   • INTRA UTERINE DEVICE REMOVAL  9/6/2011    Performed by SHERRY HEREDIA at SURGERY SAME DAY [facility] ORS   • TUBAL COAGULATION LAPAROSCOPIC BILATERAL  9/6/2011    Performed by SHERRY HEREDIA at SURGERY SAME DAY [facility] ORS   • MITRAL VALVE REPLACE  4/25/2011    Performed by RALPH MCNEIL at SURGERY [facility] ORS   • TRICUSPID VALVE REPAIR  4/25/2011    Performed by RALPH MCNEIL at SURGERY [facility] ORS   • MASS EXCISION GENERAL  4/25/2011    Performed by RALPH MCNEIL at SURGERY [facility]   • PRIMARY C SECTION  4/6/2011     Performed by DEBBIE MAXWELL at LABOR AND DELIVERY   • PACEMAKER INSERTION  2011     Family History   Problem Relation Age of Onset   • Diabetes Maternal Grandmother         IDDM   • Hypertension Maternal Grandmother         meds   • Heart Disease Maternal Grandfather    • Diabetes Paternal Grandmother         esrd     Social History     Socioeconomic History   • Marital status: Single     Spouse name: Not on file   • Number of children: Not on file   • Years of education: Not on file   • Highest education level: Not on file   Occupational History   • Not on file   Tobacco Use   • Smoking status: Never Smoker   • Smokeless tobacco: Never Used   Vaping Use   • Vaping Use: Never used   Substance and Sexual Activity   • Alcohol use: No     Alcohol/week: 0.0 oz   • Drug use: No   • Sexual activity: Not Currently     Partners: Male   Other Topics Concern   • Not on file   Social History Narrative   • Not on file     Social Determinants of Health     Financial Resource Strain:    • Difficulty of Paying Living Expenses: Not on file   Food Insecurity:    • Worried About Running Out of Food in the Last Year: Not on file   • Ran Out of Food in the Last Year: Not on file   Transportation Needs:    • Lack of Transportation (Medical): Not on file   • Lack of Transportation (Non-Medical): Not on file   Physical Activity:    • Days of Exercise per Week: Not on file   • Minutes of Exercise per Session: Not on file   Stress:    • Feeling of Stress : Not on file   Social Connections:    • Frequency of Communication with Friends and Family: Not on file   • Frequency of Social Gatherings with Friends and Family: Not on file   • Attends [employer] Services: Not on file   • Active Member of Clubs or Organizations: Not on file   • Attends Club or Organization Meetings: Not on file   • Marital Status: Not on file   Intimate Partner Violence:    • Fear of Current or Ex-Partner: Not on file   • Emotionally Abused: Not on file   • Physically  Abused: Not on file   • Sexually Abused: Not on file   Housing Stability:    • Unable to Pay for Housing in the Last Year: Not on file   • Number of Places Lived in the Last Year: Not on file   • Unstable Housing in the Last Year: Not on file     Allergies   Allergen Reactions   • No Known Drug Allergy      (Medications reviewed.)  Outpatient Encounter Medications as of 12/9/2021   Medication Sig Dispense Refill   • APPLE CIDER VINEGAR PO Take  by mouth.     • warfarin (COUMADIN) 5 MG Tab TAKE 1/2 TO 1 (ONE-HALF TO ONE) TABLET BY MOUTH ONCE DAILY AS  DIRECTED  BY  THE  [facility]  ANTICOAGULATION  CLINIC 90 Tablet 1   • metoprolol SR (TOPROL XL) 25 MG TABLET SR 24 HR Take 1 Tablet by mouth every day. **LAST SEEN 9/2020 .  TO ENSURE FURTHER REFILLS, PLEASE KEEP FOLLOW UP VISIT SCHEDULED 12/9/2021.** 90 Tablet 0   • atorvastatin (LIPITOR) 20 MG Tab Take 1 Tab by mouth every day. 90 Tab 1   • Cholecalciferol (D3 ADULT PO) Take  by mouth.     • therapeutic multivitamin-minerals (THERAGRAN-M) Tab Take 1 Tab by mouth every day.     • atorvastatin (LIPITOR) 20 MG Tab Take 1 Tablet by mouth every evening. **LAST SEEN 9/2020 .  TO ENSURE FURTHER REFILLS, PLEASE KEEP FOLLOW UP VISIT SCHEDULED 12/9/2021.** (Patient not taking: Reported on 12/9/2021) 90 Tablet 0     No facility-administered encounter medications on file as of 12/9/2021.     Review of Systems   Constitutional: Negative.  Negative for chills, fever, malaise/fatigue and weight loss.   HENT: Negative.  Negative for hearing loss.    Eyes: Negative.  Negative for blurred vision and double vision.   Respiratory: Negative.  Negative for cough and shortness of breath.    Cardiovascular: Negative.  Negative for chest pain, palpitations, claudication and leg swelling.   Gastrointestinal: Negative.  Negative for abdominal pain, nausea and vomiting.   Genitourinary: Negative.  Negative for dysuria and urgency.   Musculoskeletal: Negative.  Negative for joint pain and myalgias.  "  Skin: Negative.  Negative for itching and rash.   Neurological: Negative.  Negative for dizziness, focal weakness, weakness and headaches.   Endo/Heme/Allergies: Negative.  Does not bruise/bleed easily.   Psychiatric/Behavioral: Negative.  Negative for depression. The patient is not nervous/anxious.               Objective     /62 (BP Location: Left arm, Patient Position: Sitting, BP Cuff Size: Adult)   Pulse 96   Resp 16   Ht 1.575 m (5' 2\")   Wt 68 kg (150 lb)   SpO2 100%   BMI 27.44 kg/m²     Physical Exam  Constitutional:       Appearance: She is well-developed.   HENT:      Head: Normocephalic and atraumatic.   Neck:      Vascular: No JVD.   Cardiovascular:      Rate and Rhythm: Normal rate and regular rhythm.      Heart sounds: Normal heart sounds.   Pulmonary:      Effort: Pulmonary effort is normal.      Breath sounds: Normal breath sounds.   Abdominal:      General: Bowel sounds are normal.      Palpations: Abdomen is soft.      Comments: No hepatosplenomegaly.   Musculoskeletal:         General: Normal range of motion.   Lymphadenopathy:      Cervical: No cervical adenopathy.   Skin:     General: Skin is warm and dry.   Neurological:      Mental Status: She is alert and oriented to person, place, and time.     Normal mechanical click.       CARDIAC STUDIES/PROCEDURES:    CARDIAC CATHETERIZATION CONCLUSIONS by Cory Humphries (11)  Normal coronary arteries.  (study result reviewed)    ECHOCARDIOGRAM CONCLUSIONS (16)  Compared to the images of the study done 2012 - there has been no significant change.   Known mitral valve mechanical prosthesis which is functioning normally with appropriate transvalvular gradient.   No mitral regurgitation.   PPG: 10.98 mmHg.  MP.18 mmHg.  Normal left ventricular chamber size. Normal left ventricular wall   thickness. Normal left ventricular systolic function.   Left ventricular ejection fraction is visually estimated to be 60%. "   Normal regional wall motion.  No significant diastolic dysfunction.  Right heart pressures are normal.   The aortic root is normal.  Ascending aorta diameter is 2.4 cm.  (study result reviewed)    EKG performed on (07/16/18) was reviewed: EKG personally interpreted shows paced rhythm.    Laboratory results of (02/10/21) were reviewed. Cholesterol profile of 155/90/42/95 mg/dL noted.    PACEMAKER PLACEMENT by Mathieu Castillo (07/18/18)  [employer] dual chamber pacemaker.    Assessment & Plan     1. Mitral valve stenosis, unspecified etiology     2. Tricuspid valve insufficiency, unspecified etiology     3. Chronic anticoagulation     4. Paroxysmal atrial fibrillation (HCC)     5. Pacemaker [employer] for 3 degree heart block     6. Dyslipidemia         Medical Decision Making: Today's Assessment/Status/Plan:        1. History of mitral valve replacement (# 27-mm [employer] mechanical valve and tricuspid valve repair with 34-mm C-E rigid annuloplasty ring, left atrial thrombectomy, left atrial appendage exclusion by Trey Bradley 04/25/11 on chronic anticoagulation therapy (warfarin): The prosthetic mitral valve is functioning well. We will repeat an echocardiogram.  2. Atrial fibrillation with pacemaker placement.  3. Hyperlipidemia: She is doing well on statin therapy without myalgia symptoms. We will repeat labs including fasting lipid profile.  Greater than 45 minutes of time was spent to review all above information; of which more than 50% of the time was face to face reviewing thee patient's medical issues, medication evaluation, study result review, lab results review    We will follow up in 3 months.    CC Anne Rodrigues

## 2021-12-15 ENCOUNTER — PHARMACY VISIT (OUTPATIENT)
Dept: PHARMACY | Facility: MEDICAL CENTER | Age: 31
End: 2021-12-15
Payer: COMMERCIAL

## 2021-12-15 PROCEDURE — RXMED WILLOW AMBULATORY MEDICATION CHARGE: Performed by: INTERNAL MEDICINE

## 2021-12-15 RX ORDER — RNA INGREDIENT BNT-162B2 0.23 G/1.8ML
INJECTION, SUSPENSION INTRAMUSCULAR
Qty: 0.3 ML | Refills: 0 | OUTPATIENT
Start: 2021-12-15

## 2021-12-23 ENCOUNTER — HOSPITAL ENCOUNTER (OUTPATIENT)
Dept: LAB | Facility: MEDICAL CENTER | Age: 31
End: 2021-12-23
Attending: NURSE PRACTITIONER
Payer: COMMERCIAL

## 2021-12-23 ENCOUNTER — ANTICOAGULATION MONITORING (OUTPATIENT)
Dept: VASCULAR LAB | Facility: MEDICAL CENTER | Age: 31
End: 2021-12-23

## 2021-12-23 DIAGNOSIS — I48.0 PAROXYSMAL ATRIAL FIBRILLATION (HCC): ICD-10-CM

## 2021-12-23 LAB
INR PPP: 2.34 (ref 0.87–1.13)
PROTHROMBIN TIME: 24.9 SEC (ref 12–14.6)

## 2021-12-23 PROCEDURE — 36415 COLL VENOUS BLD VENIPUNCTURE: CPT

## 2021-12-23 PROCEDURE — 85610 PROTHROMBIN TIME: CPT

## 2021-12-24 NOTE — PROGRESS NOTES
Anticoagulation Summary  As of 2021    INR goal:  2.5-3.5   TTR:  64.3 % (6.5 y)   INR used for dosin.34 (2021)   Warfarin maintenance plan:  2.5 mg (5 mg x 0.5) every Tue, Thu, Sat; 5 mg (5 mg x 1) all other days   Weekly warfarin total:  27.5 mg   Plan last modified:  Nina CASSANDRA Filter (2021)   Next INR check:  2022   Target end date:  Indefinite    Indications    Paroxysmal atrial fibrillation (HCC) [I48.0]  Mitral stenosis s/p Mechanical MVR [I05.0]             Anticoagulation Episode Summary     INR check location:  Outside Lab    Preferred lab:      Send INR reminders to:      Comments:        Anticoagulation Care Providers     Provider Role Specialty Phone number    Bird Rodriguez D.O. Referring Family Medicine [phone]    Trey Dubon M.D. Referring Thoracic Surgery (Cardiothoracic Vascular Surgery) [phone]    Taj Allen, PharmD Responsible          Anticoagulation Patient Findings      Left voicemail message to report a subtherapeutic INR of 2.34.    Will have pt take bolus dose of warfarin today of 5 mg and then   Pt to continue with current warfarin dosing regimen. Requested pt contact the clinic for any s/s of unusual bleeding, bruising, clotting or any changes to diet or medication.    FU INR in 3 week(s).    Hedy Colvin, AgataD

## 2022-01-19 ENCOUNTER — ANTICOAGULATION MONITORING (OUTPATIENT)
Dept: VASCULAR LAB | Facility: MEDICAL CENTER | Age: 32
End: 2022-01-19

## 2022-01-19 ENCOUNTER — HOSPITAL ENCOUNTER (OUTPATIENT)
Dept: LAB | Facility: MEDICAL CENTER | Age: 32
End: 2022-01-19
Attending: NURSE PRACTITIONER
Payer: COMMERCIAL

## 2022-01-19 DIAGNOSIS — I48.0 PAROXYSMAL ATRIAL FIBRILLATION (HCC): ICD-10-CM

## 2022-01-19 LAB
INR PPP: 2.09 (ref 0.87–1.13)
PROTHROMBIN TIME: 22.9 SEC (ref 12–14.6)

## 2022-01-19 PROCEDURE — 36415 COLL VENOUS BLD VENIPUNCTURE: CPT

## 2022-01-19 PROCEDURE — 85610 PROTHROMBIN TIME: CPT

## 2022-01-19 RX ORDER — WARFARIN SODIUM 5 MG/1
TABLET ORAL
Qty: 90 TABLET | Refills: 1 | Status: SHIPPED | OUTPATIENT
Start: 2022-01-19 | End: 2022-03-10 | Stop reason: SDUPTHER

## 2022-01-19 NOTE — PROGRESS NOTES
Anticoagulation Summary  As of 2022    INR goal:  2.5-3.5   TTR:  63.6 % (6.6 y)   INR used for dosin.09 (2022)   Warfarin maintenance plan:  2.5 mg (5 mg x 0.5) every Tue, Sat; 5 mg (5 mg x 1) all other days   Weekly warfarin total:  30 mg   Plan last modified:  Everett Dangelo PharmD (2022)   Next INR check:  2022   Target end date:  Indefinite    Indications    Paroxysmal atrial fibrillation (HCC) [I48.0]  Mitral stenosis s/p Mechanical MVR [I05.0]             Anticoagulation Episode Summary     INR check location:  Outside Lab    Preferred lab:      Send INR reminders to:      Comments:        Anticoagulation Care Providers     Provider Role Specialty Phone number    Bird Rodriguez D.O. Referring Family Medicine [phone]    Trey Dubon M.D. Referring Thoracic Surgery (Cardiothoracic Vascular Surgery) [phone]    Agata SantosD Responsible          Anticoagulation Patient Findings  Patient Findings     Positives:  Change in health (eating more greens, plans to continue)    Negatives:  Signs/symptoms of thrombosis, Signs/symptoms of bleeding, Laboratory test error suspected, Change in alcohol use, Change in activity, Upcoming invasive procedure, Emergency department visit, Upcoming dental procedure, Missed doses, Extra doses, Change in medications, Change in diet/appetite, Hospital admission, Bruising, Other complaints        Spoke with patient today regarding subtherapeutic INR of 2.09.  Patient denies any signs/symptoms of bruising or bleeding or any changes in  medications.  Instructed patient to call clinic with any questions or concerns.  More greens in diet, plans to continue this.    Pt is not on antiplatelet therapy    Instructed patient to bolus with 7.5mg X 1, then increase weekly warfarin regimen.  Follow up in 2 weeks, to reduce risk of adverse events related to this high risk medication,  Warfarin.    Everett Dangelo, [place], BCACP

## 2022-02-07 ENCOUNTER — TELEPHONE (OUTPATIENT)
Dept: CARDIOLOGY | Facility: MEDICAL CENTER | Age: 32
End: 2022-02-07

## 2022-02-07 DIAGNOSIS — I10 ESSENTIAL HYPERTENSION: ICD-10-CM

## 2022-02-07 DIAGNOSIS — E78.5 HYPERLIPIDEMIA LDL GOAL <100: ICD-10-CM

## 2022-02-07 RX ORDER — METOPROLOL SUCCINATE 25 MG/1
25 TABLET, EXTENDED RELEASE ORAL DAILY
Qty: 90 TABLET | Refills: 3 | Status: SHIPPED | OUTPATIENT
Start: 2022-02-07 | End: 2023-04-03 | Stop reason: SDUPTHER

## 2022-02-07 RX ORDER — ATORVASTATIN CALCIUM 20 MG/1
20 TABLET, FILM COATED ORAL EVERY EVENING
Qty: 90 TABLET | Refills: 3 | Status: SHIPPED | OUTPATIENT
Start: 2022-02-07 | End: 2023-02-06

## 2022-02-07 NOTE — TELEPHONE ENCOUNTER
JI    Patient called needing a refill for atorvastatin (LIPITOR) 20 MG Tab and metoprolol SR (TOPROL XL) 25 MG TABLET SR 24 HR. She does it to go the [facility].     Best Contact Number: [phone]    Thank you

## 2022-02-17 ENCOUNTER — TELEPHONE (OUTPATIENT)
Dept: VASCULAR LAB | Facility: MEDICAL CENTER | Age: 32
End: 2022-02-17
Payer: COMMERCIAL

## 2022-02-23 ENCOUNTER — ANTICOAGULATION MONITORING (OUTPATIENT)
Dept: VASCULAR LAB | Facility: MEDICAL CENTER | Age: 32
End: 2022-02-23

## 2022-02-23 ENCOUNTER — HOSPITAL ENCOUNTER (OUTPATIENT)
Dept: LAB | Facility: MEDICAL CENTER | Age: 32
End: 2022-02-23
Attending: NURSE PRACTITIONER
Payer: COMMERCIAL

## 2022-02-23 DIAGNOSIS — I48.0 PAROXYSMAL ATRIAL FIBRILLATION (HCC): ICD-10-CM

## 2022-02-23 LAB
INR PPP: 2.29 (ref 0.87–1.13)
PROTHROMBIN TIME: 24.5 SEC (ref 12–14.6)

## 2022-02-23 PROCEDURE — 85610 PROTHROMBIN TIME: CPT

## 2022-02-23 PROCEDURE — 36415 COLL VENOUS BLD VENIPUNCTURE: CPT

## 2022-02-24 NOTE — PROGRESS NOTES
Anticoagulation Summary  As of 2022    INR goal:  2.5-3.5   TTR:  62.7 % (6.7 y)   INR used for dosin.29 (2022)   Warfarin maintenance plan:  2.5 mg (5 mg x 0.5) every Tue; 5 mg (5 mg x 1) all other days   Weekly warfarin total:  32.5 mg   Plan last modified:  Taj Allen PharmD (2022)   Next INR check:  3/2/2022   Target end date:  Indefinite    Indications    Paroxysmal atrial fibrillation (HCC) [I48.0]  Mitral stenosis s/p Mechanical MVR [I05.0]             Anticoagulation Episode Summary     INR check location:  Outside Lab    Preferred lab:      Send INR reminders to:      Comments:        Anticoagulation Care Providers     Provider Role Specialty Phone number    Bird Rodriguez D.O. Referring Family Medicine [phone]    Trey Dubon M.D. Referring Thoracic Surgery (Cardiothoracic Vascular Surgery) [phone]    Taj Allen, [place] Responsible          Anticoagulation Patient Findings          Left voicemail message to report a SUB therapeutic INR of 2.3.  Pt to bolus today then begin increased warfarin dosing regimen. Requested pt contact the clinic for any s/s of unusual bleeding, bruising, clotting or any changes to diet or medication. FU 1 weeks.    Taj Allen, AgataD

## 2022-03-03 ENCOUNTER — ANTICOAGULATION MONITORING (OUTPATIENT)
Dept: CARDIOLOGY | Facility: MEDICAL CENTER | Age: 32
End: 2022-03-03

## 2022-03-03 ENCOUNTER — ANTICOAGULATION MONITORING (OUTPATIENT)
Dept: VASCULAR LAB | Facility: MEDICAL CENTER | Age: 32
End: 2022-03-03

## 2022-03-03 ENCOUNTER — HOSPITAL ENCOUNTER (OUTPATIENT)
Dept: LAB | Facility: MEDICAL CENTER | Age: 32
End: 2022-03-03
Attending: NURSE PRACTITIONER
Payer: COMMERCIAL

## 2022-03-03 DIAGNOSIS — I48.0 PAROXYSMAL ATRIAL FIBRILLATION (HCC): ICD-10-CM

## 2022-03-03 LAB
INR PPP: 2.79 (ref 0.87–1.13)
PROTHROMBIN TIME: 28.6 SEC (ref 12–14.6)

## 2022-03-03 PROCEDURE — 36415 COLL VENOUS BLD VENIPUNCTURE: CPT

## 2022-03-03 PROCEDURE — 85610 PROTHROMBIN TIME: CPT

## 2022-03-04 NOTE — PROGRESS NOTES
Anticoagulation Summary  As of 3/3/2022    INR goal:  2.5-3.5   TTR:  62.7 % (6.7 y)   INR used for dosin.79 (3/3/2022)   Warfarin maintenance plan:  2.5 mg (5 mg x 0.5) every Tue; 5 mg (5 mg x 1) all other days   Weekly warfarin total:  32.5 mg   Plan last modified:  Taj Allen PharmD (2022)   Next INR check:  3/17/2022   Target end date:  Indefinite    Indications    Paroxysmal atrial fibrillation (HCC) [I48.0]  Mitral stenosis s/p Mechanical MVR [I05.0]             Anticoagulation Episode Summary     INR check location:  Outside Lab    Preferred lab:      Send INR reminders to:      Comments:        Anticoagulation Care Providers     Provider Role Specialty Phone number    Bird Rodriguez D.O. Referring Family Medicine [phone]    Trey Dubon M.D. Referring Thoracic Surgery (Cardiothoracic Vascular Surgery) [phone]    Taj Allen, PharmD Responsible          Anticoagulation Patient Findings          Left voicemail message to report a   therapeutic INR of 2.79.  Pt to continue with current warfarin dosing regimen. Requested pt contact the clinic for any s/s of unusual bleeding, bruising, clotting or any changes to diet or medication.  Follow up in 2 weeks, to reduce the risk of adverse events related to this high risk medication, warfarin.    Nina Membreno, Clinical Pharmacist

## 2022-03-08 ENCOUNTER — APPOINTMENT (OUTPATIENT)
Dept: CARDIOLOGY | Facility: MEDICAL CENTER | Age: 32
End: 2022-03-08
Attending: INTERNAL MEDICINE
Payer: COMMERCIAL

## 2022-03-10 DIAGNOSIS — I48.0 PAROXYSMAL ATRIAL FIBRILLATION (HCC): ICD-10-CM

## 2022-03-10 RX ORDER — WARFARIN SODIUM 5 MG/1
TABLET ORAL
Qty: 90 TABLET | Refills: 1 | Status: SHIPPED | OUTPATIENT
Start: 2022-03-10 | End: 2023-04-03 | Stop reason: SDUPTHER

## 2022-03-30 ENCOUNTER — TELEPHONE (OUTPATIENT)
Dept: VASCULAR LAB | Facility: MEDICAL CENTER | Age: 32
End: 2022-03-30
Payer: COMMERCIAL

## 2022-04-05 ENCOUNTER — HOSPITAL ENCOUNTER (OUTPATIENT)
Dept: LAB | Facility: MEDICAL CENTER | Age: 32
End: 2022-04-05
Attending: NURSE PRACTITIONER
Payer: COMMERCIAL

## 2022-04-05 ENCOUNTER — ANTICOAGULATION MONITORING (OUTPATIENT)
Dept: VASCULAR LAB | Facility: MEDICAL CENTER | Age: 32
End: 2022-04-05

## 2022-04-05 DIAGNOSIS — I48.0 PAROXYSMAL ATRIAL FIBRILLATION (HCC): ICD-10-CM

## 2022-04-05 LAB
INR PPP: 2.96 (ref 0.87–1.13)
PROTHROMBIN TIME: 29.9 SEC (ref 12–14.6)

## 2022-04-05 PROCEDURE — 85610 PROTHROMBIN TIME: CPT

## 2022-04-05 PROCEDURE — 36415 COLL VENOUS BLD VENIPUNCTURE: CPT

## 2022-04-05 NOTE — PROGRESS NOTES
Anticoagulation Summary  As of 2022    INR goal:  2.5-3.5   TTR:  63.2 % (6.8 y)   INR used for dosin.96 (2022)   Warfarin maintenance plan:  2.5 mg (5 mg x 0.5) every Tue; 5 mg (5 mg x 1) all other days   Weekly warfarin total:  32.5 mg   Plan last modified:  Taj Allen PharmD (2022)   Next INR check:  5/3/2022   Target end date:  Indefinite    Indications    Paroxysmal atrial fibrillation (HCC) [I48.0]  Mitral stenosis s/p Mechanical MVR [I05.0]             Anticoagulation Episode Summary     INR check location:  Outside Lab    Preferred lab:      Send INR reminders to:      Comments:        Anticoagulation Care Providers     Provider Role Specialty Phone number    Bird Rodriguez D.O. Referring Family Medicine [phone]    Trey Dubno M.D. Referring Thoracic Surgery (Cardiothoracic Vascular Surgery) [phone]    Taj Allen, PharmD Responsible          Anticoagulation Patient Findings          Left voicemail message to report a   therapeutic INR of 2.79.  Pt to continue with current warfarin dosing regimen. Requested pt contact the clinic for any s/s of unusual bleeding, bruising, clotting or any changes to diet or medication.  Follow up in 4 weeks, to reduce the risk of adverse events related to this high risk medication, warfarin.    Nina Membreno, Clinical Pharmacist

## 2022-05-12 ENCOUNTER — TELEPHONE (OUTPATIENT)
Dept: CARDIOLOGY | Facility: MEDICAL CENTER | Age: 32
End: 2022-05-12
Payer: COMMERCIAL

## 2022-05-12 NOTE — TELEPHONE ENCOUNTER
Spoke to patient over phone who indicates she needs refills of Metoprolol and Atorvastatin. Per patient, she has not reached out to pharmacy. Advised to contact pharmacy for refills first as both RX's were sent on 02/07/22 with a year of refills. Patient will reach out if pharmacy does not have RX's on file.

## 2022-05-12 NOTE — TELEPHONE ENCOUNTER
----- Message from Latisha Monreal sent at 2022 10:06 AM PDT -----  Regarding: RX Refill  [facility] Message from PT    AW  TO: Ofc  NM: April Ludy   PH: [phone]   PT NM: Ludy, April   : 1990   REG DR: Group   RE: Would like a call back to request  a Rx refill.    DISP HIST: 05/10/2022 07:27P TE p/disp  05/10/2022 07:30P 1AF [place]    Thanks!   SLC

## 2022-05-20 ENCOUNTER — DOCUMENTATION (OUTPATIENT)
Dept: VASCULAR LAB | Facility: MEDICAL CENTER | Age: 32
End: 2022-05-20
Payer: COMMERCIAL

## 2022-05-20 NOTE — PROGRESS NOTES
[facility]      Pt is over due for PT/INR for warfarin monitoring. Left message for patient to have INR checked ASAP.     Clinic phone number left for any questions or concerns.    Elizabeth Pham  PharmD

## 2022-05-24 ENCOUNTER — NON-PROVIDER VISIT (OUTPATIENT)
Dept: CARDIOLOGY | Facility: MEDICAL CENTER | Age: 32
End: 2022-05-24
Payer: COMMERCIAL

## 2022-05-24 DIAGNOSIS — Z95.0 PACEMAKER: ICD-10-CM

## 2022-05-24 DIAGNOSIS — I44.2 AV BLOCK, 3RD DEGREE (HCC): ICD-10-CM

## 2022-05-24 PROCEDURE — 93280 PM DEVICE PROGR EVAL DUAL: CPT | Performed by: INTERNAL MEDICINE

## 2022-05-25 ENCOUNTER — HOSPITAL ENCOUNTER (OUTPATIENT)
Dept: LAB | Facility: MEDICAL CENTER | Age: 32
End: 2022-05-25
Attending: NURSE PRACTITIONER
Payer: COMMERCIAL

## 2022-05-25 DIAGNOSIS — I48.0 PAROXYSMAL ATRIAL FIBRILLATION (HCC): ICD-10-CM

## 2022-06-02 ENCOUNTER — ANTICOAGULATION MONITORING (OUTPATIENT)
Dept: VASCULAR LAB | Facility: MEDICAL CENTER | Age: 32
End: 2022-06-02

## 2022-06-02 ENCOUNTER — HOSPITAL ENCOUNTER (OUTPATIENT)
Dept: LAB | Facility: MEDICAL CENTER | Age: 32
End: 2022-06-02
Attending: NURSE PRACTITIONER
Payer: COMMERCIAL

## 2022-06-02 ENCOUNTER — HOSPITAL ENCOUNTER (OUTPATIENT)
Dept: CARDIOLOGY | Facility: MEDICAL CENTER | Age: 32
End: 2022-06-02
Attending: INTERNAL MEDICINE
Payer: COMMERCIAL

## 2022-06-02 DIAGNOSIS — I05.0 MITRAL VALVE STENOSIS, UNSPECIFIED ETIOLOGY: ICD-10-CM

## 2022-06-02 DIAGNOSIS — I48.0 PAROXYSMAL ATRIAL FIBRILLATION (HCC): ICD-10-CM

## 2022-06-02 DIAGNOSIS — I07.1 TRICUSPID VALVE INSUFFICIENCY, UNSPECIFIED ETIOLOGY: ICD-10-CM

## 2022-06-02 LAB
INR PPP: 3.3 (ref 0.87–1.13)
LV EJECT FRACT  99904: 65
LV EJECT FRACT MOD 2C 99903: 57.97
LV EJECT FRACT MOD 4C 99902: 64.1
LV EJECT FRACT MOD BP 99901: 60.68
PROTHROMBIN TIME: 32.5 SEC (ref 12–14.6)

## 2022-06-02 PROCEDURE — 85610 PROTHROMBIN TIME: CPT

## 2022-06-02 PROCEDURE — 93306 TTE W/DOPPLER COMPLETE: CPT

## 2022-06-02 PROCEDURE — 93306 TTE W/DOPPLER COMPLETE: CPT | Mod: 26 | Performed by: INTERNAL MEDICINE

## 2022-06-02 PROCEDURE — 36415 COLL VENOUS BLD VENIPUNCTURE: CPT

## 2022-06-02 NOTE — PROGRESS NOTES
Anticoagulation Summary  As of 6/2/2022    INR goal:  2.5-3.5   TTR:  63.7 % (6.9 y)   INR used for dosing:  3.30 (6/2/2022)   Warfarin maintenance plan:  2.5 mg (5 mg x 0.5) every Tue; 5 mg (5 mg x 1) all other days   Weekly warfarin total:  32.5 mg   Plan last modified:  Taj Allen PharmD (2/23/2022)   Next INR check:  7/28/2022   Target end date:  Indefinite    Indications    Paroxysmal atrial fibrillation (HCC) [I48.0]  Mitral stenosis s/p Mechanical MVR [I05.0]             Anticoagulation Episode Summary     INR check location:  Outside Lab    Preferred lab:      Send INR reminders to:      Comments:        Anticoagulation Care Providers     Provider Role Specialty Phone number    Bird Rodriguez D.O. Referring Family Medicine [phone]    Trey Dubon M.D. Referring Thoracic Surgery (Cardiothoracic Vascular Surgery) [phone]    Taj Allen, PharmD Responsible          Anticoagulation Patient Findings      Left voicemail message to report a therapeutic INR of 3.3     Pt to continue with current warfarin dosing regimen. Requested pt contact the clinic for any s/s of unusual bleeding, bruising, clotting or any changes to diet or medication.    FU INR in 8 week(s).    Hedy Colvin, PharmD

## 2022-08-11 ENCOUNTER — TELEPHONE (OUTPATIENT)
Dept: VASCULAR LAB | Facility: MEDICAL CENTER | Age: 32
End: 2022-08-11
Payer: COMMERCIAL

## 2022-08-11 NOTE — TELEPHONE ENCOUNTER
Left message for pt to have INR checked  Nina Filter, Clinical Pharmacist, CDE, CACP  Managed Care Pharmacist for [facility] and [facility]

## 2022-08-23 ENCOUNTER — HOSPITAL ENCOUNTER (OUTPATIENT)
Dept: LAB | Facility: MEDICAL CENTER | Age: 32
End: 2022-08-23
Attending: NURSE PRACTITIONER
Payer: COMMERCIAL

## 2022-08-23 DIAGNOSIS — I48.0 PAROXYSMAL ATRIAL FIBRILLATION (HCC): ICD-10-CM

## 2022-08-23 LAB
INR PPP: 3.55 (ref 0.87–1.13)
PROTHROMBIN TIME: 34.2 SEC (ref 12–14.6)

## 2022-08-23 PROCEDURE — 85610 PROTHROMBIN TIME: CPT

## 2022-08-23 PROCEDURE — 36415 COLL VENOUS BLD VENIPUNCTURE: CPT

## 2022-08-24 ENCOUNTER — ANTICOAGULATION MONITORING (OUTPATIENT)
Dept: VASCULAR LAB | Facility: MEDICAL CENTER | Age: 32
End: 2022-08-24
Payer: COMMERCIAL

## 2022-08-24 DIAGNOSIS — I05.0 MITRAL VALVE STENOSIS, UNSPECIFIED ETIOLOGY: ICD-10-CM

## 2022-08-24 DIAGNOSIS — I48.0 PAROXYSMAL ATRIAL FIBRILLATION (HCC): ICD-10-CM

## 2022-08-24 NOTE — PROGRESS NOTES
Anticoagulation Summary  As of 8/24/2022      INR goal:  2.5-3.5   TTR:  64.2 % (7.2 y)   INR used for dosing:  3.55 (8/23/2022)   Warfarin maintenance plan:  2.5 mg (5 mg x 0.5) every Tue; 5 mg (5 mg x 1) all other days   Weekly warfarin total:  32.5 mg   Plan last modified:  Taj Allen PharmD (2/23/2022)   Next INR check:  10/19/2022   Target end date:  Indefinite    Indications    Paroxysmal atrial fibrillation (HCC) [I48.0]  Mitral stenosis s/p Mechanical MVR [I05.0]                 Anticoagulation Episode Summary       INR check location:  Outside Lab    Preferred lab:      Send INR reminders to:      Comments:            Anticoagulation Care Providers       Provider Role Specialty Phone number    Bird Rodriguez D.O. Referring Family Medicine [phone]    Trey Dubon M.D. Referring Thoracic Surgery (Cardiothoracic Vascular Surgery) [phone]    Taj Allen PharmD Responsible            Anticoagulation Patient Findings      Left voicemail to report a supra-therapeutic INR of 3.55.      Pt to continue with current warfarin dosing regimen. Requested pt contact the clinic for any s/s of unusual bleeding, bruising, clotting or any changes to diet or medication. The INR was barely out of range not significant enough to warrant a correction.    FU INR in 8 week(s).    Kavon Schwartz PhT  Discussed with Hedy Kate

## 2022-10-11 ENCOUNTER — IMMUNIZATION (OUTPATIENT)
Dept: OCCUPATIONAL MEDICINE | Facility: CLINIC | Age: 32
End: 2022-10-11

## 2022-10-11 DIAGNOSIS — Z23 NEED FOR VACCINATION: Primary | ICD-10-CM

## 2022-10-11 PROCEDURE — 90686 IIV4 VACC NO PRSV 0.5 ML IM: CPT | Performed by: NURSE PRACTITIONER

## 2022-10-24 ENCOUNTER — ANTICOAGULATION MONITORING (OUTPATIENT)
Dept: MEDICAL GROUP | Facility: PHYSICIAN GROUP | Age: 32
End: 2022-10-24
Payer: COMMERCIAL

## 2022-10-24 ENCOUNTER — HOSPITAL ENCOUNTER (OUTPATIENT)
Dept: LAB | Facility: MEDICAL CENTER | Age: 32
End: 2022-10-24
Attending: NURSE PRACTITIONER
Payer: COMMERCIAL

## 2022-10-24 DIAGNOSIS — I48.0 PAROXYSMAL ATRIAL FIBRILLATION (HCC): ICD-10-CM

## 2022-10-24 DIAGNOSIS — I05.0 MITRAL VALVE STENOSIS, UNSPECIFIED ETIOLOGY: ICD-10-CM

## 2022-10-24 LAB
INR PPP: 3.54 (ref 0.87–1.13)
PROTHROMBIN TIME: 34.1 SEC (ref 12–14.6)

## 2022-10-24 PROCEDURE — 85610 PROTHROMBIN TIME: CPT

## 2022-10-24 PROCEDURE — 36415 COLL VENOUS BLD VENIPUNCTURE: CPT

## 2022-10-24 NOTE — PROGRESS NOTES
Anticoagulation Summary  As of 10/24/2022      INR goal:  2.5-3.5   TTR:  62.7 % (7.3 y)   INR used for dosing:  3.54 (10/24/2022)   Warfarin maintenance plan:  2.5 mg (5 mg x 0.5) every Tue, Fri; 5 mg (5 mg x 1) all other days   Weekly warfarin total:  30 mg   Plan last modified:  Taj Allen PharmD (10/24/2022)   Next INR check:  11/14/2022   Target end date:  Indefinite    Indications    Paroxysmal atrial fibrillation (HCC) [I48.0]  Mitral stenosis s/p Mechanical MVR [I05.0]                 Anticoagulation Episode Summary       INR check location:  Outside Lab    Preferred lab:      Send INR reminders to:      Comments:            Anticoagulation Care Providers       Provider Role Specialty Phone number    Bird Rodriguez D.O. Referring Family Medicine [phone]    Trey Dubon M.D. Referring Thoracic Surgery (Cardiothoracic Vascular Surgery) [phone]    Taj Allen, [place] Responsible            Anticoagulation Patient Findings          Left voicemail message to report a SUPRA therapeutic INR of 3.54.  Pt to begin reduced warfarin dosing regimen. Requested pt contact the clinic for any s/s of unusual bleeding, bruising, clotting or any changes to diet or medication. FU 2-3 weeks.    Taj Allen, AgataD

## 2022-12-06 ENCOUNTER — HOSPITAL ENCOUNTER (OUTPATIENT)
Dept: LAB | Facility: MEDICAL CENTER | Age: 32
End: 2022-12-06
Attending: INTERNAL MEDICINE
Payer: COMMERCIAL

## 2022-12-06 ENCOUNTER — HOSPITAL ENCOUNTER (OUTPATIENT)
Dept: LAB | Facility: MEDICAL CENTER | Age: 32
End: 2022-12-06
Attending: NURSE PRACTITIONER
Payer: COMMERCIAL

## 2022-12-06 ENCOUNTER — ANTICOAGULATION MONITORING (OUTPATIENT)
Dept: VASCULAR LAB | Facility: MEDICAL CENTER | Age: 32
End: 2022-12-06
Payer: COMMERCIAL

## 2022-12-06 DIAGNOSIS — I48.0 PAROXYSMAL ATRIAL FIBRILLATION (HCC): ICD-10-CM

## 2022-12-06 DIAGNOSIS — I05.0 MITRAL VALVE STENOSIS, UNSPECIFIED ETIOLOGY: ICD-10-CM

## 2022-12-06 DIAGNOSIS — E78.5 DYSLIPIDEMIA: ICD-10-CM

## 2022-12-06 LAB
ALBUMIN SERPL BCP-MCNC: 4.3 G/DL (ref 3.2–4.9)
ALBUMIN/GLOB SERPL: 1.3 G/DL
ALP SERPL-CCNC: 54 U/L (ref 30–99)
ALT SERPL-CCNC: 18 U/L (ref 2–50)
ANION GAP SERPL CALC-SCNC: 10 MMOL/L (ref 7–16)
AST SERPL-CCNC: 28 U/L (ref 12–45)
BILIRUB SERPL-MCNC: 0.5 MG/DL (ref 0.1–1.5)
BUN SERPL-MCNC: 10 MG/DL (ref 8–22)
CALCIUM SERPL-MCNC: 9.1 MG/DL (ref 8.5–10.5)
CHLORIDE SERPL-SCNC: 103 MMOL/L (ref 96–112)
CHOLEST SERPL-MCNC: 166 MG/DL (ref 100–199)
CO2 SERPL-SCNC: 23 MMOL/L (ref 20–33)
CREAT SERPL-MCNC: 0.61 MG/DL (ref 0.5–1.4)
FASTING STATUS PATIENT QL REPORTED: NORMAL
GFR SERPLBLD CREATININE-BSD FMLA CKD-EPI: 120 ML/MIN/1.73 M 2
GLOBULIN SER CALC-MCNC: 3.4 G/DL (ref 1.9–3.5)
GLUCOSE SERPL-MCNC: 76 MG/DL (ref 65–99)
HDLC SERPL-MCNC: 49 MG/DL
INR PPP: 2.43 (ref 0.87–1.13)
LDLC SERPL CALC-MCNC: 106 MG/DL
POTASSIUM SERPL-SCNC: 4.3 MMOL/L (ref 3.6–5.5)
PROT SERPL-MCNC: 7.7 G/DL (ref 6–8.2)
PROTHROMBIN TIME: 25.7 SEC (ref 12–14.6)
SODIUM SERPL-SCNC: 136 MMOL/L (ref 135–145)
TRIGL SERPL-MCNC: 54 MG/DL (ref 0–149)

## 2022-12-06 PROCEDURE — 36415 COLL VENOUS BLD VENIPUNCTURE: CPT

## 2022-12-06 PROCEDURE — 80053 COMPREHEN METABOLIC PANEL: CPT

## 2022-12-06 PROCEDURE — 80061 LIPID PANEL: CPT

## 2022-12-06 PROCEDURE — 85610 PROTHROMBIN TIME: CPT

## 2022-12-07 NOTE — PROGRESS NOTES
OP Anticoagulation Service Note    Date: 2022    Anticoagulation Summary  As of 2022      INR goal:  2.5-3.5   TTR:  63.1 % (7.5 y)   INR used for dosin.43 (2022)   Warfarin maintenance plan:  2.5 mg (5 mg x 0.5) every e, Fri; 5 mg (5 mg x 1) all other days; Starting 2022   Weekly warfarin total:  30 mg   Plan last modified:  Taj Allen, PharmD (10/24/2022)   Next INR check:  2022   Target end date:  Indefinite    Indications    Paroxysmal atrial fibrillation (HCC) [I48.0]  Mitral stenosis s/p Mechanical MVR [I05.0]                 Anticoagulation Episode Summary       INR check location:  Outside Lab    Preferred lab:      Send INR reminders to:      Comments:            Anticoagulation Care Providers       Provider Role Specialty Phone number    Bird Rodriguez D.O. Referring Family Medicine [phone]    Trey Dubon M.D. Referring Thoracic Surgery (Cardiothoracic Vascular Surgery) [phone]    Taj Allen, PharmD Responsible            Anticoagulation Patient Findings        Voice message for patient regarding their anticoagulant.   Patient's preferred phone number:  [phone]        HPI:   The reason for today's call is to prevent morbidity and mortality from a blood clot and/or stroke and to reduce the risk of bleeding while on a anticoagulant.     PCP:  [facility]  [id number] [facility] [id number]  [place] [facility] [address]    Assessment:     INR  sub-therapeutic.     Lab Results   Component Value Date/Time    BUN 10 2022 06:36 AM    CREATININE 0.61 2022 06:36 AM     Lab Results   Component Value Date/Time    HEMOGLOBIN 15.4 2020 03:41 PM    HEMATOCRIT 47.3 (H) 2020 03:41 PM    PLATELETCT 266 2020 03:41 PM    ALKPHOSPHAT 54 2022 06:36 AM    ASTSGOT 28 2022 06:36 AM    ALTSGPT 18 2022 06:36 AM          Current Outpatient Medications:     warfarin, Take one-half to one tablet by mouth daily or as directed by  anticoagulation clinic    atorvastatin, 20 mg, Oral, Q EVENING    metoprolol SR, 25 mg, Oral, DAILY    Pfizer-BioNTech COVID-19 Vacc, Inject 0.3 mL into the shoulder, thigh, or buttocks.    APPLE CIDER VINEGAR PO, Take  by mouth.    Cholecalciferol (D3 ADULT PO), Take  by mouth.    therapeutic multivitamin-minerals, 1 Tablet, Oral, DAILY      Plan:     5mg today then Continue the same warfarin dose, as noted above.       Follow-up:     test in 3 weeks.        Additional information discussed with patient:     Asked patient to please call the anticoagulation clinic if they have any signs/symptoms of bleeding and/or thrombosis or any changes to diet or medications.      National recommendations regarding anticoagulation therapy:     The CHEST guidelines recommends frequent INR monitoring at regular intervals (a few days up to a max of 12 weeks) to ensure patients are on the proper dose of warfarin, and patients are not having any complications from therapy.  INRs can dramatically change over a short time period due to diet, medications, and medical conditions.       Tan Helton, PharmD, MS, BCACP, LCC  [facility]  Phone: [phone]  Fax: [phone]  On call: [phone]  General scheduling/information [phone]  For emergencies please dial [phone]  Please do not use [facility] for urgent matters, call the phone numbers listed above.    This note was created using voice recognition software (Dragon). The accuracy of the dictation is limited by the abilities of the software. I have reviewed the note prior to signing, however some errors in grammar and context are still possible. If you have any questions related to this note please do not hesitate to contact our office.

## 2022-12-20 NOTE — PROGRESS NOTES
Anticoagulation Summary  As of 8/31/2018    INR goal:   2.5-3.5   TTR:   61.7 % (3.2 y)   Today's INR:   4.28!   Warfarin maintenance plan:   5 mg (5 mg x 1) on Mon, Wed, Fri; 10 mg (5 mg x 2) all other days   Weekly warfarin total:   55 mg   Plan last modified:   Taj Allen PharmD (8/31/2018)   Next INR check:   9/7/2018   Target end date:   Indefinite    Indications    Paroxysmal atrial fibrillation (HCC) [I48.0]  Mitral stenosis s/p Mechanical MVR [I05.0]             Anticoagulation Episode Summary     INR check location:   Outside Lab    Preferred lab:       Send INR reminders to:       Comments:   [facility]       Anticoagulation Care Providers     Provider Role Specialty Phone number    Bird Rodriguez D.O. Referring Family Medicine [phone]    Trey Dubon M.D. Referring Cardiac Surgery [phone]    Taj Allen, PharmD Responsible          Anticoagulation Patient Findings     Left voicemail message to report a SUPRA therapeutic INR of 4.3.  Pt to hold today then begin reduced warfarin dosing regimen. Requested pt contact the clinic for any s/s of unusual bleeding, bruising, clotting or any changes to diet or medication. FU 1 weeks.    Taj Allen, AgataD     Orbicularis Oris Muscle Flap Text: The defect edges were debeveled with a #15 scalpel blade.  Given that the defect affected the competency of the oral sphincter an obicularis oris muscle flap was deemed most appropriate to restore this competency and normal muscle function.  Using a sterile surgical marker, an appropriate flap was drawn incorporating the defect. The area thus outlined was incised with a #15 scalpel blade.

## 2022-12-28 ENCOUNTER — APPOINTMENT (OUTPATIENT)
Dept: DERMATOLOGY | Facility: IMAGING CENTER | Age: 32
End: 2022-12-28

## 2023-02-06 ENCOUNTER — TELEPHONE (OUTPATIENT)
Dept: CARDIOLOGY | Facility: MEDICAL CENTER | Age: 33
End: 2023-02-06
Payer: COMMERCIAL

## 2023-02-06 DIAGNOSIS — E78.5 HYPERLIPIDEMIA LDL GOAL <100: ICD-10-CM

## 2023-02-06 RX ORDER — ATORVASTATIN CALCIUM 20 MG/1
20 TABLET, FILM COATED ORAL EVERY EVENING
Qty: 90 TABLET | Refills: 0 | Status: SHIPPED | OUTPATIENT
Start: 2023-02-06 | End: 2023-04-03 | Stop reason: SDUPTHER

## 2023-02-06 NOTE — TELEPHONE ENCOUNTER
Is the patient due for a refill? Yes    Was the patient seen the past year? No    Date of last office visit: 12/9/21    Does the patient have an upcoming appointment?  No    Provider to refill:MARISSA    Does the patients insurance require a 100 day supply?  No      First courtesy

## 2023-02-06 NOTE — TELEPHONE ENCOUNTER
Phone Number Called: [phone] (home)     Message: LVM for the patient to call back and schedule a follow up visit. MyChart letter sent to the patient as well.

## 2023-02-06 NOTE — LETTER
February 6, 2023        Sameer Boston  [address]  [place] [address]        Dear Sameer,    After several attempts, we have been unable to contact you to schedule an appointment.    Please call our office at [phone] at your earliest convenience.    Thank you for your prompt attention to this matter.        Scheduling Department      Toll Free Number [phone]

## 2023-02-06 NOTE — TELEPHONE ENCOUNTER
To schedulers, Please schedule pt for [facility].  Last seen  12/2021.  Thank you!    =======================    90 day courtesy refill sent.

## 2023-02-13 ENCOUNTER — OFFICE VISIT (OUTPATIENT)
Dept: URGENT CARE | Facility: PHYSICIAN GROUP | Age: 33
End: 2023-02-13
Payer: COMMERCIAL

## 2023-02-13 VITALS
SYSTOLIC BLOOD PRESSURE: 120 MMHG | BODY MASS INDEX: 26.68 KG/M2 | TEMPERATURE: 99.1 F | OXYGEN SATURATION: 100 % | DIASTOLIC BLOOD PRESSURE: 60 MMHG | HEIGHT: 62 IN | HEART RATE: 84 BPM | WEIGHT: 145 LBS | RESPIRATION RATE: 18 BRPM

## 2023-02-13 DIAGNOSIS — H66.002 ACUTE SUPPURATIVE OTITIS MEDIA OF LEFT EAR: ICD-10-CM

## 2023-02-13 PROCEDURE — 99213 OFFICE O/P EST LOW 20 MIN: CPT | Performed by: NURSE PRACTITIONER

## 2023-02-13 RX ORDER — AMOXICILLIN AND CLAVULANATE POTASSIUM 875; 125 MG/1; MG/1
1 TABLET, FILM COATED ORAL 2 TIMES DAILY
Qty: 14 TABLET | Refills: 0 | Status: SHIPPED | OUTPATIENT
Start: 2023-02-13 | End: 2023-02-20

## 2023-02-13 RX ORDER — OFLOXACIN 3 MG/ML
5 SOLUTION AURICULAR (OTIC) DAILY
Qty: 10 ML | Refills: 0 | Status: SHIPPED | OUTPATIENT
Start: 2023-02-13 | End: 2023-04-03

## 2023-02-13 ASSESSMENT — ENCOUNTER SYMPTOMS
NAUSEA: 0
SORE THROAT: 0
COUGH: 0
HEADACHES: 0
MYALGIAS: 0
DIZZINESS: 0
CHILLS: 0
FEVER: 0

## 2023-02-14 NOTE — PROGRESS NOTES
Subjective     Sameer Boston is a 32 y.o. female who presents with Otalgia (X 3 days Lft ear pain, drainage, pressure)            HPI  New problem.  Patient is a 32-year-old female who presents with left-sided ear pain x3 days.  She reports discharge from the ear as well as increasing pressure.  She does endorse some nasal congestion as well.  She denies fever, chills, dizziness, headache, or nausea.  She has been taking over-the-counter Tylenol for the symptoms with some relief.  She does have a history several years ago of having a similar type infection.    No known drug allergy  Current Outpatient Medications on File Prior to Visit   Medication Sig Dispense Refill    atorvastatin (LIPITOR) 20 MG Tab Take 1 Tablet by mouth every evening. Please call to schedule follow up appointment for further refills. Please call [phone]. Thank you. 90 Tablet 0    warfarin (COUMADIN) 5 MG Tab Take one-half to one tablet by mouth daily or as directed by anticoagulation clinic 90 Tablet 1    metoprolol SR (TOPROL XL) 25 MG TABLET SR 24 HR Take 1 Tablet by mouth every day. 90 Tablet 3    COVID-19 mRNA Vaccine, Pfizer, (PFIZER-BIONTECH COVID-19 VACC) 30 MCG/0.3ML Suspension injection Inject 0.3 mL into the shoulder, thigh, or buttocks. 0.3 mL 0    Cholecalciferol (D3 ADULT PO) Take  by mouth.      therapeutic multivitamin-minerals (THERAGRAN-M) Tab Take 1 Tab by mouth every day.       No current facility-administered medications on file prior to visit.     Social History     Socioeconomic History    Marital status: Single     Spouse name: Not on file    Number of children: Not on file    Years of education: Not on file    Highest education level: Not on file   Occupational History    Not on file   Tobacco Use    Smoking status: Never    Smokeless tobacco: Never   Vaping Use    Vaping Use: Never used   Substance and Sexual Activity    Alcohol use: No     Alcohol/week: 0.0 oz    Drug use: No    Sexual activity: Not  "Currently     Partners: Male   Other Topics Concern    Not on file   Social History Narrative    Not on file     Social Determinants of Health     Financial Resource Strain: Not on file   Food Insecurity: Not on file   Transportation Needs: Not on file   Physical Activity: Not on file   Stress: Not on file   Social Connections: Not on file   Intimate Partner Violence: Not on file   Housing Stability: Not on file     Breast Cancer-related family history is not on file.      Review of Systems   Constitutional:  Negative for chills, fever and malaise/fatigue.   HENT:  Positive for congestion, ear discharge and ear pain. Negative for hearing loss, sore throat and tinnitus.    Respiratory:  Negative for cough.    Gastrointestinal:  Negative for nausea.   Musculoskeletal:  Negative for myalgias.   Neurological:  Negative for dizziness and headaches.            Objective     /60 (BP Location: Left arm, Patient Position: Sitting, BP Cuff Size: Adult)   Pulse 84   Temp 37.3 °C (99.1 °F) (Temporal)   Resp 18   Ht 1.575 m (5' 2\")   Wt 65.8 kg (145 lb)   SpO2 100%   BMI 26.52 kg/m²      Physical Exam  Constitutional:       General: She is not in acute distress.     Appearance: Normal appearance. She is well-developed.   HENT:      Head: Normocephalic.      Right Ear: Tympanic membrane and external ear normal.      Left Ear: External ear normal. Drainage present. A middle ear effusion is present. Tympanic membrane is injected and erythematous.      Nose: Mucosal edema, congestion and rhinorrhea present.      Mouth/Throat:      Pharynx: No posterior oropharyngeal erythema.   Eyes:      General:         Right eye: No discharge.         Left eye: No discharge.      Conjunctiva/sclera: Conjunctivae normal.   Cardiovascular:      Rate and Rhythm: Normal rate and regular rhythm.      Heart sounds: Normal heart sounds.   Musculoskeletal:         General: Normal range of motion.      Cervical back: Normal range of motion " and neck supple.   Lymphadenopathy:      Cervical: No cervical adenopathy.   Skin:     General: Skin is warm and dry.   Neurological:      Mental Status: She is alert and oriented to person, place, and time.   Psychiatric:         Behavior: Behavior normal.         Thought Content: Thought content normal.                           Assessment & Plan        1. Acute suppurative otitis media of left ear  ofloxacin otic sol (FLOXIN OTIC) 0.3 % Solution    amoxicillin-clavulanate (AUGMENTIN) 875-125 MG Tab        Tylenol for discomfort.  Augmentin and floxin.  Differential diagnosis, natural history, supportive care, and indications for immediate follow-up were discussed.

## 2023-03-08 ENCOUNTER — ANTICOAGULATION MONITORING (OUTPATIENT)
Dept: VASCULAR LAB | Facility: MEDICAL CENTER | Age: 33
End: 2023-03-08
Payer: COMMERCIAL

## 2023-03-08 ENCOUNTER — HOSPITAL ENCOUNTER (OUTPATIENT)
Dept: LAB | Facility: MEDICAL CENTER | Age: 33
End: 2023-03-08
Attending: NURSE PRACTITIONER
Payer: COMMERCIAL

## 2023-03-08 DIAGNOSIS — I48.0 PAROXYSMAL ATRIAL FIBRILLATION (HCC): ICD-10-CM

## 2023-03-08 DIAGNOSIS — I05.0 MITRAL VALVE STENOSIS, UNSPECIFIED ETIOLOGY: ICD-10-CM

## 2023-03-08 LAB
INR PPP: 4.48 (ref 0.87–1.13)
PROTHROMBIN TIME: 40.8 SEC (ref 12–14.6)

## 2023-03-08 PROCEDURE — 85610 PROTHROMBIN TIME: CPT

## 2023-03-08 PROCEDURE — 36415 COLL VENOUS BLD VENIPUNCTURE: CPT

## 2023-03-08 NOTE — PROGRESS NOTES
Anticoagulation Summary  As of 3/8/2023      INR goal:  2.5-3.5   TTR:  62.5 % (7.7 y)   INR used for dosin.48 (3/8/2023)   Warfarin maintenance plan:  2.5 mg (5 mg x 0.5) every Tue, Fri; 5 mg (5 mg x 1) all other days   Weekly warfarin total:  30 mg   Plan last modified:  Taj Allen, PharmD (10/24/2022)   Next INR check:  3/15/2023   Target end date:  Indefinite    Indications    Paroxysmal atrial fibrillation (HCC) [I48.0]  Mitral stenosis s/p Mechanical MVR [I05.0]                 Anticoagulation Episode Summary       INR check location:  Outside Lab    Preferred lab:      Send INR reminders to:      Comments:            Anticoagulation Care Providers       Provider Role Specialty Phone number    Bidr Rodriguez D.O. Referring Family Medicine [phone]    Trey Dubon M.D. Referring Thoracic Surgery (Cardiothoracic Vascular Surgery) [phone]    Taj Allen, PharmD Responsible            Anticoagulation Patient Findings    Left voicemail message to report a supra-therapeutic INR.    Will have pt HOLD dose on 3/8/23 and then continue with current regimen.  *Discussed dose changes with Hedy Colvin, Pharmacist.     Requested pt contact the clinic for any s/s of unusual bleeding, bruising, clotting or any changes to diet or medication.    FU INR in 1 week(s).    Leeanne Valenzuela, Pharmacy Intern

## 2023-04-03 ENCOUNTER — OFFICE VISIT (OUTPATIENT)
Dept: CARDIOLOGY | Facility: MEDICAL CENTER | Age: 33
End: 2023-04-03
Payer: COMMERCIAL

## 2023-04-03 VITALS
HEART RATE: 84 BPM | RESPIRATION RATE: 16 BRPM | SYSTOLIC BLOOD PRESSURE: 112 MMHG | BODY MASS INDEX: 27.09 KG/M2 | HEIGHT: 62 IN | OXYGEN SATURATION: 96 % | WEIGHT: 147.2 LBS | DIASTOLIC BLOOD PRESSURE: 68 MMHG

## 2023-04-03 DIAGNOSIS — E78.5 HYPERLIPIDEMIA LDL GOAL <100: ICD-10-CM

## 2023-04-03 DIAGNOSIS — E78.5 DYSLIPIDEMIA: ICD-10-CM

## 2023-04-03 DIAGNOSIS — Z95.2 H/O MITRAL VALVE REPLACEMENT WITH MECHANICAL VALVE: ICD-10-CM

## 2023-04-03 DIAGNOSIS — I10 ESSENTIAL HYPERTENSION: ICD-10-CM

## 2023-04-03 DIAGNOSIS — Z79.01 CHRONIC ANTICOAGULATION: ICD-10-CM

## 2023-04-03 DIAGNOSIS — I07.1 TRICUSPID VALVE INSUFFICIENCY, UNSPECIFIED ETIOLOGY: ICD-10-CM

## 2023-04-03 DIAGNOSIS — I48.0 PAROXYSMAL ATRIAL FIBRILLATION (HCC): ICD-10-CM

## 2023-04-03 PROCEDURE — 99214 OFFICE O/P EST MOD 30 MIN: CPT | Performed by: INTERNAL MEDICINE

## 2023-04-03 RX ORDER — WARFARIN SODIUM 5 MG/1
TABLET ORAL
Qty: 90 TABLET | Refills: 3 | Status: SHIPPED | OUTPATIENT
Start: 2023-04-03

## 2023-04-03 RX ORDER — ATORVASTATIN CALCIUM 20 MG/1
20 TABLET, FILM COATED ORAL EVERY EVENING
Qty: 90 TABLET | Refills: 3 | Status: SHIPPED | OUTPATIENT
Start: 2023-04-03

## 2023-04-03 RX ORDER — METOPROLOL SUCCINATE 25 MG/1
25 TABLET, EXTENDED RELEASE ORAL DAILY
Qty: 90 TABLET | Refills: 3 | Status: SHIPPED | OUTPATIENT
Start: 2023-04-03

## 2023-04-03 ASSESSMENT — ENCOUNTER SYMPTOMS
CLAUDICATION: 0
COUGH: 0
SHORTNESS OF BREATH: 0
FEVER: 0
DIZZINESS: 0
EYES NEGATIVE: 1
BLURRED VISION: 0
FOCAL WEAKNESS: 0
NAUSEA: 0
HEADACHES: 0
WEAKNESS: 0
CHILLS: 0
NERVOUS/ANXIOUS: 0
NEUROLOGICAL NEGATIVE: 1
PSYCHIATRIC NEGATIVE: 1
ABDOMINAL PAIN: 0
RESPIRATORY NEGATIVE: 1
DOUBLE VISION: 0
WEIGHT LOSS: 0
CONSTITUTIONAL NEGATIVE: 1
MUSCULOSKELETAL NEGATIVE: 1
MYALGIAS: 0
BRUISES/BLEEDS EASILY: 0
PALPITATIONS: 0
DEPRESSION: 0
GASTROINTESTINAL NEGATIVE: 1
VOMITING: 0
CARDIOVASCULAR NEGATIVE: 1

## 2023-04-03 NOTE — PROGRESS NOTES
Chief Complaint   Patient presents with    Atrial Fibrillation     F/V Dx: Paroxysmal atrial fibrillation (HCC)    Dyslipidemia    Mitral Stenosis/Insufficiency     F/V Dx: Mitral valve stenosis, unspecified etiology       Subjective     Sameer Boston is a 32 y.o. female who presents today for follow up of mitral valve replacement, tricuspid valve repair, chronic anticoagulation therapy, atrial fibrillation, pacemaker placement.    Since the patient's last visit on 12/09/21, she has been doing well clinically. She denies fatigue, shortness of breath, dyspnea on exertion, chest pain, dizziness or syncope. She keeps active working out at the gym 4 times per week.    Past Medical History:   Diagnosis Date    Anemia 4/17/2011    CHF (congestive heart failure) (HCC)     Chronic anticoagulation     CVA (cerebral infarction) 2011    Elbow fracture     History of atrial fibrillation     Hyperlipidemia     Left atrial thrombus     Migraine     occasional    Mitral stenosis     s/p mechanical MVR on coumadin    Pacemaker     [employer] for 3rd degree AVB    Pulmonary hypertension (HCC)     Third degree heart block (HCC)     Tricuspid regurgitation     s/p repair     Past Surgical History:   Procedure Laterality Date    ORIF, ELBOW Right 1/26/2016    Procedure: ELBOW ORIF olecranon;  Surgeon: Fausto Nunez M.D.;  Location: SURGERY [facility];  Service:     PELVISCOPY  9/6/2011    Performed by SHERRY HEREDIA at SURGERY SAME DAY [facility] ORS    INTRA UTERINE DEVICE REMOVAL  9/6/2011    Performed by SHERRY HEREDIA at SURGERY SAME DAY [facility] ORS    TUBAL COAGULATION LAPAROSCOPIC BILATERAL  9/6/2011    Performed by SHERRY HEREDIA at SURGERY SAME DAY [facility] ORS    MITRAL VALVE REPLACE  4/25/2011    Performed by RALPH MCNEIL at SURGERY [facility]    TRICUSPID VALVE REPAIR  4/25/2011    Performed by RALPH MCNEIL at SURGERY [facility] ORS    MASS EXCISION GENERAL  4/25/2011     Performed by RALPH MCNEIL at [facility]    PRIMARY C SECTION  4/6/2011    Performed by DEBBIE MAXWELL at LABOR AND DELIVERY    PACEMAKER INSERTION  2011     Family History   Problem Relation Age of Onset    Diabetes Maternal Grandmother         IDDM    Hypertension Maternal Grandmother         meds    Heart Disease Maternal Grandfather     Diabetes Paternal Grandmother         esrd     Social History     Socioeconomic History    Marital status: Single     Spouse name: Not on file    Number of children: Not on file    Years of education: Not on file    Highest education level: Not on file   Occupational History    Not on file   Tobacco Use    Smoking status: Never    Smokeless tobacco: Never   Vaping Use    Vaping Use: Never used   Substance and Sexual Activity    Alcohol use: No     Alcohol/week: 0.0 oz    Drug use: No    Sexual activity: Not Currently     Partners: Male   Other Topics Concern    Not on file   Social History Narrative    Not on file     Social Determinants of Health     Financial Resource Strain: Not on file   Food Insecurity: Not on file   Transportation Needs: Not on file   Physical Activity: Not on file   Stress: Not on file   Social Connections: Not on file   Intimate Partner Violence: Not on file   Housing Stability: Not on file     Allergies   Allergen Reactions    No Known Drug Allergy      (Medications reviewed.)  Outpatient Encounter Medications as of 4/3/2023   Medication Sig Dispense Refill    atorvastatin (LIPITOR) 20 MG Tab Take 1 Tablet by mouth every evening. Please call to schedule follow up appointment for further refills. Please call [phone]. Thank you. 90 Tablet 0    warfarin (COUMADIN) 5 MG Tab Take one-half to one tablet by mouth daily or as directed by anticoagulation clinic 90 Tablet 1    metoprolol SR (TOPROL XL) 25 MG TABLET SR 24 HR Take 1 Tablet by mouth every day. 90 Tablet 3    COVID-19 mRNA Vaccine, Pfizer, (PFIZER-NewACT COVID-19 VACC) 30 MCG/0.3ML  "Suspension injection Inject 0.3 mL into the shoulder, thigh, or buttocks. 0.3 mL 0    Cholecalciferol (D3 ADULT PO) Take  by mouth.      therapeutic multivitamin-minerals (THERAGRAN-M) Tab Take 1 Tab by mouth every day.      [DISCONTINUED] ofloxacin otic sol (FLOXIN OTIC) 0.3 % Solution Administer 5 Drops into affected ear(s) every day. (Patient not taking: Reported on 4/3/2023) 10 mL 0     No facility-administered encounter medications on file as of 4/3/2023.     Review of Systems   Constitutional: Negative.  Negative for chills, fever, malaise/fatigue and weight loss.   HENT: Negative.  Negative for hearing loss.    Eyes: Negative.  Negative for blurred vision and double vision.   Respiratory: Negative.  Negative for cough and shortness of breath.    Cardiovascular: Negative.  Negative for chest pain, palpitations, claudication and leg swelling.   Gastrointestinal: Negative.  Negative for abdominal pain, nausea and vomiting.   Genitourinary: Negative.  Negative for dysuria and urgency.   Musculoskeletal: Negative.  Negative for joint pain and myalgias.   Skin: Negative.  Negative for itching and rash.   Neurological: Negative.  Negative for dizziness, focal weakness, weakness and headaches.   Endo/Heme/Allergies: Negative.  Does not bruise/bleed easily.   Psychiatric/Behavioral: Negative.  Negative for depression. The patient is not nervous/anxious.             Objective     /68 (BP Location: Left arm, Patient Position: Sitting, BP Cuff Size: Adult)   Pulse 84   Resp 16   Ht 1.575 m (5' 2\")   Wt 66.8 kg (147 lb 3.2 oz)   SpO2 96%   BMI 26.92 kg/m²     Physical Exam  Constitutional:       Appearance: Normal appearance. She is well-developed and normal weight.   HENT:      Head: Normocephalic and atraumatic.   Neck:      Vascular: No JVD.   Cardiovascular:      Rate and Rhythm: Normal rate and regular rhythm.      Heart sounds: Normal heart sounds.   Pulmonary:      Effort: Pulmonary effort is normal.    "   Breath sounds: Normal breath sounds.   Abdominal:      General: Bowel sounds are normal.      Palpations: Abdomen is soft.      Comments: No hepatosplenomegaly.   Musculoskeletal:         General: Normal range of motion.   Lymphadenopathy:      Cervical: No cervical adenopathy.   Skin:     General: Skin is warm and dry.   Neurological:      Mental Status: She is alert and oriented to person, place, and time.   Normal mechanical click.       CARDIAC STUDIES/PROCEDURES:     CARDIAC CATHETERIZATION CONCLUSIONS by Cory Humphries (11)  Normal coronary arteries.    ECHOCARDIOGRAM CONCLUSIONS (22)  Prior echo on 2016, compared to the report of the prior study,   there has been no significant change.   Normal left ventricular systolic function.  The left ventricular ejection fraction is visually estimated to be 65%.  Known mitral valve mechanical prosthesis which is functioning normally   with appropriate transvalvular gradient.  Mean gradient of 4 mmHg.  Estimated right ventricular systolic pressure is 20 mmHg.  (study result reviewed)      ECHOCARDIOGRAM CONCLUSIONS (16)  Compared to the images of the study done 2012 - there has been no significant change.   Known mitral valve mechanical prosthesis which is functioning normally with appropriate transvalvular gradient.   No mitral regurgitation.   PPG: 10.98 mmHg.  MP.18 mmHg.  Normal left ventricular chamber size. Normal left ventricular wall   thickness. Normal left ventricular systolic function.   Left ventricular ejection fraction is visually estimated to be 60%.   Normal regional wall motion.  No significant diastolic dysfunction.  Right heart pressures are normal.   The aortic root is normal.  Ascending aorta diameter is 2.4 cm.    EKG performed on (18) EKG shows paced rhythm.     Laboratory results of (22) were reviewed. Cholesterol profile of 166/54/49/106 mg/dL noted.  Laboratory results of (02/10/21)  Cholesterol profile of 155/90/42/95 mg/dL noted.     PACEMAKER PLACEMENT by Mathieu Castillo (07/18/18)  [employer] dual chamber pacemaker.       Assessment & Plan     1. H/O mitral valve replacement with mechanical valve        2. Tricuspid valve insufficiency, unspecified etiology        3. Paroxysmal atrial fibrillation (HCC)        4. Chronic anticoagulation        5. Dyslipidemia            Medical Decision Making: Today's Assessment/Status/Plan:        History of mitral valve replacement (# 27-mm [employer] mechanical valve and tricuspid valve repair with 34-mm C-E rigid annuloplasty ring, left atrial thrombectomy, left atrial appendage exclusion by Trey Bradley 04/25/11 on chronic anticoagulation therapy (warfarin): She is clinically doing well. We will repeat an echocardiogram in one year.   Atrial fibrillation  with pacemaker placement on anticoagulation therapy: She is doing well on anticoagulation and the ventricular rate is well controlled.  Hyperlipidemia: She is doing well on statin therapy without myalgia symptoms. We will repeat labs including fasting lipid profile in one year.     We will follow up the patient in one year.    CC Anne Rodrigues

## 2023-06-26 DIAGNOSIS — I48.0 PAROXYSMAL ATRIAL FIBRILLATION (HCC): ICD-10-CM

## 2023-06-26 NOTE — PROGRESS NOTES
Pt called stating that she needs a new SO for her INR - placed accordingly.    Jono Curry, AgataD, BCACP

## 2023-06-28 ENCOUNTER — ANTICOAGULATION MONITORING (OUTPATIENT)
Dept: VASCULAR LAB | Facility: MEDICAL CENTER | Age: 33
End: 2023-06-28
Payer: COMMERCIAL

## 2023-06-28 ENCOUNTER — HOSPITAL ENCOUNTER (OUTPATIENT)
Dept: LAB | Facility: MEDICAL CENTER | Age: 33
End: 2023-06-28
Attending: NURSE PRACTITIONER
Payer: COMMERCIAL

## 2023-06-28 DIAGNOSIS — I48.0 PAROXYSMAL ATRIAL FIBRILLATION (HCC): ICD-10-CM

## 2023-06-28 LAB
INR PPP: 2.77 (ref 0.87–1.13)
PROTHROMBIN TIME: 28.4 SEC (ref 12–14.6)

## 2023-06-28 PROCEDURE — 85610 PROTHROMBIN TIME: CPT

## 2023-06-28 PROCEDURE — 36415 COLL VENOUS BLD VENIPUNCTURE: CPT

## 2023-06-28 NOTE — PROGRESS NOTES
Anticoagulation Summary  As of 2023      INR goal:  2.5-3.5   TTR:  61.7 % (8 y)   INR used for dosin.77 (2023)   Warfarin maintenance plan:  2.5 mg (5 mg x 0.5) every Tue, Fri; 5 mg (5 mg x 1) all other days   Weekly warfarin total:  30 mg   Plan last modified:  Taj Allen, PharmD (10/24/2022)   Next INR check:  2023   Target end date:  Indefinite    Indications    Paroxysmal atrial fibrillation (HCC) [I48.0]  Mitral stenosis s/p Mechanical MVR (Resolved) [I05.0]                 Anticoagulation Episode Summary       INR check location:  Outside Lab    Preferred lab:      Send INR reminders to:      Comments:            Anticoagulation Care Providers       Provider Role Specialty Phone number    Bird Rodriguez D.O. Referring Family Medicine [phone]    Trey Dubon M.D. Referring Thoracic Surgery (Cardiothoracic Vascular Surgery) [phone]    Taj Allen, PharmD Responsible            Anticoagulation Patient Findings      Left voicemail  message to report a therapeutic INR.    Pt to continue with current warfarin dosing regimen. Requested pt contact the clinic for any s/s of unusual bleeding, bruising, clotting or any changes to diet or medication.    FU INR in 4 week(s).    Bella Ibrahim, Pharmacy Intern

## 2023-08-29 ENCOUNTER — OFFICE VISIT (OUTPATIENT)
Dept: MEDICAL GROUP | Facility: MEDICAL CENTER | Age: 33
End: 2023-08-29
Payer: COMMERCIAL

## 2023-08-29 VITALS
DIASTOLIC BLOOD PRESSURE: 76 MMHG | HEIGHT: 61 IN | TEMPERATURE: 97.6 F | WEIGHT: 153.6 LBS | BODY MASS INDEX: 29 KG/M2 | HEART RATE: 79 BPM | RESPIRATION RATE: 20 BRPM | SYSTOLIC BLOOD PRESSURE: 110 MMHG | OXYGEN SATURATION: 97 %

## 2023-08-29 DIAGNOSIS — Z95.2 H/O MITRAL VALVE REPLACEMENT WITH MECHANICAL VALVE: ICD-10-CM

## 2023-08-29 DIAGNOSIS — I07.1 TRICUSPID VALVE INSUFFICIENCY, UNSPECIFIED ETIOLOGY: ICD-10-CM

## 2023-08-29 DIAGNOSIS — K62.5 BRBPR (BRIGHT RED BLOOD PER RECTUM): ICD-10-CM

## 2023-08-29 PROCEDURE — 3078F DIAST BP <80 MM HG: CPT | Performed by: PHYSICIAN ASSISTANT

## 2023-08-29 PROCEDURE — 99213 OFFICE O/P EST LOW 20 MIN: CPT | Performed by: PHYSICIAN ASSISTANT

## 2023-08-29 PROCEDURE — 3074F SYST BP LT 130 MM HG: CPT | Performed by: PHYSICIAN ASSISTANT

## 2023-08-29 ASSESSMENT — PATIENT HEALTH QUESTIONNAIRE - PHQ9: CLINICAL INTERPRETATION OF PHQ2 SCORE: 0

## 2023-08-29 NOTE — PROGRESS NOTES
"Subjective:   Sameer Boston is a 33 y.o. female here today for     HPI:Patient is here to discuss:    Patient reports 1 episode of bright red blood per rectum last week.  Denies burning, tearing or pain with defecation.  Denies change in the caliber or frequency of stool.      ROS   No headaches, chest pain, no shortness of breath, abdominal pain, nausea, or vomiting.  All other systems were reviewed and are negative or noted as positive in the HPI.     Objective:     /76   Pulse 79   Temp 36.4 °C (97.6 °F) (Temporal)   Resp 20   Ht 1.56 m (5' 1.42\")   Wt 69.7 kg (153 lb 9.6 oz)   SpO2 97%  Body mass index is 28.63 kg/m².     Physical Exam:  General: Patient appears well-nourished, well-hydrated, nontoxic  HEENT, normocephalic atraumatic, PERRLA, extraocular movements intact, nares are patent and clear  Neck: No visible masses, thyromegaly or abnormalities noted  Cardiovascular.  Sitting comfortably without visible signs of edema  Lungs: No cyanosis noted, nondyspneic  Skin: Well perfused without evidence of rash or lesions  Neurological: Cranial nerves II through XII intact, normal gait  Musculoskeletal: Normal range of motion, normal strength and no deficit noted     Clinical Course/Lab Analysis:        Assessment and Plan:   The following treatment plan was discussed.  Signs and symptoms for which to return were discussed with patient at length.  Patient verbalized understanding.    1. H/O mitral valve replacement with mechanical valve        2. Tricuspid valve insufficiency, unspecified etiology        3. BRBPR (bright red blood per rectum)            1 and 2.  History of mitral valve and tricuspid valve insufficiency.  12 years ago patient had repair of both tricuspid valve and mitral valve with replacement.  [facility]  Subsequently this is a chronic condition she is now on warfarin 2.5 mg daily and does follow with anticoagulation clinic.  She is also taking " atorvastatin 20 mg daily    3 Bright red blood per rectum is new and unstable condition.  She had one episode and she is under 40 years of age without any alarm conditions.  Check CBC told increase fiber likely hemorrhoids.  If any recurrence will send to gastroenterology    Followup: Within a month or sooner as needed    Please note that this dictation was created using voice recognition software. I have made every reasonable attempt to correct obvious errors, but I expect that there are errors of grammar and possibly content that I did not discover before finalizing the note.

## 2023-09-07 ENCOUNTER — HOSPITAL ENCOUNTER (OUTPATIENT)
Dept: LAB | Facility: MEDICAL CENTER | Age: 33
End: 2023-09-07
Attending: PHYSICIAN ASSISTANT
Payer: COMMERCIAL

## 2023-09-07 ENCOUNTER — HOSPITAL ENCOUNTER (OUTPATIENT)
Dept: LAB | Facility: MEDICAL CENTER | Age: 33
End: 2023-09-07
Attending: NURSE PRACTITIONER
Payer: COMMERCIAL

## 2023-09-07 ENCOUNTER — ANTICOAGULATION MONITORING (OUTPATIENT)
Dept: MEDICAL GROUP | Facility: PHYSICIAN GROUP | Age: 33
End: 2023-09-07
Payer: COMMERCIAL

## 2023-09-07 DIAGNOSIS — K62.5 BRBPR (BRIGHT RED BLOOD PER RECTUM): ICD-10-CM

## 2023-09-07 DIAGNOSIS — I48.0 PAROXYSMAL ATRIAL FIBRILLATION (HCC): ICD-10-CM

## 2023-09-07 LAB
BASOPHILS # BLD AUTO: 0.4 % (ref 0–1.8)
BASOPHILS # BLD: 0.02 K/UL (ref 0–0.12)
EOSINOPHIL # BLD AUTO: 0.24 K/UL (ref 0–0.51)
EOSINOPHIL NFR BLD: 4.3 % (ref 0–6.9)
ERYTHROCYTE [DISTWIDTH] IN BLOOD BY AUTOMATED COUNT: 44.3 FL (ref 35.9–50)
HCT VFR BLD AUTO: 47.8 % (ref 37–47)
HGB BLD-MCNC: 15.6 G/DL (ref 12–16)
IMM GRANULOCYTES # BLD AUTO: 0.01 K/UL (ref 0–0.11)
IMM GRANULOCYTES NFR BLD AUTO: 0.2 % (ref 0–0.9)
INR PPP: 2.49 (ref 0.87–1.13)
LYMPHOCYTES # BLD AUTO: 1.39 K/UL (ref 1–4.8)
LYMPHOCYTES NFR BLD: 25 % (ref 22–41)
MCH RBC QN AUTO: 30.9 PG (ref 27–33)
MCHC RBC AUTO-ENTMCNC: 32.6 G/DL (ref 32.2–35.5)
MCV RBC AUTO: 94.7 FL (ref 81.4–97.8)
MONOCYTES # BLD AUTO: 0.29 K/UL (ref 0–0.85)
MONOCYTES NFR BLD AUTO: 5.2 % (ref 0–13.4)
NEUTROPHILS # BLD AUTO: 3.61 K/UL (ref 1.82–7.42)
NEUTROPHILS NFR BLD: 64.9 % (ref 44–72)
NRBC # BLD AUTO: 0 K/UL
NRBC BLD-RTO: 0 /100 WBC (ref 0–0.2)
PLATELET # BLD AUTO: 227 K/UL (ref 164–446)
PMV BLD AUTO: 11.5 FL (ref 9–12.9)
PROTHROMBIN TIME: 27.3 SEC (ref 12–14.6)
RBC # BLD AUTO: 5.05 M/UL (ref 4.2–5.4)
WBC # BLD AUTO: 5.6 K/UL (ref 4.8–10.8)

## 2023-09-07 PROCEDURE — 85610 PROTHROMBIN TIME: CPT

## 2023-09-07 PROCEDURE — 85025 COMPLETE CBC W/AUTO DIFF WBC: CPT

## 2023-09-07 PROCEDURE — 36415 COLL VENOUS BLD VENIPUNCTURE: CPT

## 2023-09-07 NOTE — PROGRESS NOTES
OP Anticoagulation Service Note    Date: 2023    Anticoagulation Summary  As of 2023      INR goal:  2.5-3.5   TTR:  63.0 % (8.2 y)   INR used for dosin.49 (2023)   Warfarin maintenance plan:  2.5 mg (5 mg x 0.5) every Tue, Fri; 5 mg (5 mg x 1) all other days   Weekly warfarin total:  30 mg   Plan last modified:  Taj Allen PharmD (10/24/2022)   Next INR check:  2023   Target end date:  Indefinite    Indications    Paroxysmal atrial fibrillation (HCC) [I48.0]  Mitral stenosis s/p Mechanical MVR (Resolved) [I05.0]                 Anticoagulation Episode Summary       INR check location:  Outside Lab    Preferred lab:      Send INR reminders to:      Comments:            Anticoagulation Care Providers       Provider Role Specialty Phone number    Bird Rodriguez D.O. Referring Family Medicine [phone]    Trey Dubon M.D. Referring Thoracic Surgery (Cardiothoracic Vascular Surgery) [phone]    Taj Allen, PharmD Responsible            Anticoagulation Patient Findings        Patient's preferred phone number:  [phone]        HPI:   The reason for today's call is to prevent morbidity and mortality from a blood clot and/or stroke and to reduce the risk of bleeding while on a anticoagulant.     PCP:  XAVI Rey  [id number] [facility] [id number]  [place] [address]    Assessment:     INR goal for this PT: 2.5-3.5  INR   Date Value Ref Range Status   2023 2.49 (H) 0.87 - 1.13 Final     Comment:     INR - Non-therapeutic Reference Range: 0.87-1.13  INR - Therapeutic Reference Range: 2.0-4.0         Lab Results   Component Value Date/Time    BUN 10 2022 06:36 AM    CREATININE 0.61 2022 06:36 AM     Lab Results   Component Value Date/Time    HEMOGLOBIN 15.6 2023 06:54 AM    HEMATOCRIT 47.8 (H) 2023 06:54 AM    PLATELETCT 227 2023 06:54 AM    ALKPHOSPHAT 54 2022 06:36 AM    ASTSGOT 28 2022 06:36 AM    ALTSGPT 18 2022 06:36  AM          Current Outpatient Medications:     atorvastatin, 20 mg, Oral, Q EVENING    warfarin, Take one-half to one tablet by mouth daily or as directed by anticoagulation clinic    metoprolol SR, 25 mg, Oral, DAILY    Pfizer-BioNTech COVID-19 Vacc, Inject 0.3 mL into the shoulder, thigh, or buttocks.    Cholecalciferol (D3 ADULT PO), Take  by mouth.    therapeutic multivitamin-minerals, 1 Tablet, Oral, DAILY      Plan:     7.5mg today then Continue the same warfarin dose, as noted above.       Follow-up:     On date seen above    Additional information discussed with patient:     Asked patient to please call the anticoagulation clinic if they have any signs/symptoms of bleeding and/or thrombosis or any changes to diet or medications.      National recommendations regarding anticoagulation therapy:     The CHEST guidelines recommends frequent INR monitoring at regular intervals (a few days up to a max of 12 weeks) to ensure patients are on the proper dose of warfarin, and patients are not having any complications from therapy.  INRs can dramatically change over a short time period due to diet, medications, and medical conditions.       [facility]  Phone: [phone]  Fax: [phone]  On call: [phone]  General scheduling/information [phone]  For emergencies please dial [phone]  Please do not use [facility]iro for urgent matters, call the phone numbers listed above.    This note was created using voice recognition software (Dragon). The accuracy of the dictation is limited by the abilities of the software. I have reviewed the note prior to signing, however some errors in grammar and context are still possible. If you have any questions related to this note please do not hesitate to contact our office.

## 2023-11-15 ENCOUNTER — HOSPITAL ENCOUNTER (OUTPATIENT)
Dept: LAB | Facility: MEDICAL CENTER | Age: 33
End: 2023-11-15
Attending: NURSE PRACTITIONER
Payer: COMMERCIAL

## 2023-11-15 ENCOUNTER — ANTICOAGULATION MONITORING (OUTPATIENT)
Dept: VASCULAR LAB | Facility: MEDICAL CENTER | Age: 33
End: 2023-11-15
Payer: COMMERCIAL

## 2023-11-15 DIAGNOSIS — I48.0 PAROXYSMAL ATRIAL FIBRILLATION (HCC): ICD-10-CM

## 2023-11-15 LAB
INR PPP: 2.56 (ref 0.87–1.13)
PROTHROMBIN TIME: 27.9 SEC (ref 12–14.6)

## 2023-11-15 PROCEDURE — 85610 PROTHROMBIN TIME: CPT

## 2023-11-15 PROCEDURE — 36415 COLL VENOUS BLD VENIPUNCTURE: CPT

## 2023-11-16 NOTE — PROGRESS NOTES
Anticoagulation Summary  As of 11/15/2023      INR goal:  2.5-3.5   TTR:  63.5 % (8.4 y)   INR used for dosin.56 (11/15/2023)   Warfarin maintenance plan:  2.5 mg (5 mg x 0.5) every Tue, Fri; 5 mg (5 mg x 1) all other days   Weekly warfarin total:  30 mg   Plan last modified:  Taj Allen PharmD (10/24/2022)   Next INR check:  2024   Target end date:  Indefinite    Indications    Paroxysmal atrial fibrillation (HCC) [I48.0]  Mitral stenosis s/p Mechanical MVR (Resolved) [I05.0]                 Anticoagulation Episode Summary       INR check location:  Outside Lab    Preferred lab:      Send INR reminders to:      Comments:            Anticoagulation Care Providers       Provider Role Specialty Phone number    Bird Rodriguez D.O. Referring Family Medicine [phone]    Trey Dubon M.D. Referring Thoracic Surgery (Cardiothoracic Vascular Surgery) [phone]    Taj Allen, PharmD Responsible            Anticoagulation Patient Findings      Left voicemail message to report a therapeutic INR of 2.56.    Will have pt continue on with current warfarin dosing regimen.   Requested pt contact the clinic for any s/s of unusual bleeding, bruising, clotting or any changes to diet or medication.    FU INR in 11 week(s).    Jono Curry, PharmD, BCACP

## 2024-03-05 ENCOUNTER — TELEPHONE (OUTPATIENT)
Dept: SCHEDULING | Facility: IMAGING CENTER | Age: 34
End: 2024-03-05
Payer: COMMERCIAL

## 2024-03-05 DIAGNOSIS — I48.0 PAROXYSMAL ATRIAL FIBRILLATION (HCC): ICD-10-CM

## 2024-03-05 NOTE — TELEPHONE ENCOUNTER
Caller: Sameer Simms [place]    Topic/issue: Patient requesting call back today to advise on if her standing order for INR is still valid before she goes into lab tomorrow? Mentioned to patient about message sent 03/04 to schedule appointment, but patient states she only goes to lab for her INR. Please advise.     Callback Number: [phone] (home)     Thank you,  Lashonda PADILLA

## 2024-03-05 NOTE — PROGRESS NOTES
S/w patient regarding overdue INR.  She will visit lab tomorrow to have done, updated standing order placed.  Everett Dangelo, AgataD, BCACP

## 2024-03-06 ENCOUNTER — HOSPITAL ENCOUNTER (OUTPATIENT)
Dept: LAB | Facility: MEDICAL CENTER | Age: 34
End: 2024-03-06
Payer: COMMERCIAL

## 2024-03-06 ENCOUNTER — ANTICOAGULATION MONITORING (OUTPATIENT)
Dept: VASCULAR LAB | Facility: MEDICAL CENTER | Age: 34
End: 2024-03-06
Payer: COMMERCIAL

## 2024-03-06 DIAGNOSIS — I48.0 PAROXYSMAL ATRIAL FIBRILLATION (HCC): ICD-10-CM

## 2024-03-06 LAB
INR PPP: 3.76 (ref 0.87–1.13)
PROTHROMBIN TIME: 37.7 SEC (ref 12–14.6)

## 2024-03-06 PROCEDURE — 36415 COLL VENOUS BLD VENIPUNCTURE: CPT

## 2024-03-06 PROCEDURE — 85610 PROTHROMBIN TIME: CPT

## 2024-03-07 NOTE — PROGRESS NOTES
Anticoagulation Summary  As of 3/6/2024      INR goal:  2.5-3.5   TTR:  64.0% (8.7 y)   INR used for dosing:  3.76 (3/6/2024)   Warfarin maintenance plan:  2.5 mg (5 mg x 0.5) every Tue, Fri; 5 mg (5 mg x 1) all other days   Weekly warfarin total:  30 mg   Plan last modified:  Agata SantosD (10/24/2022)   Next INR check:  3/20/2024   Target end date:  Indefinite    Indications    Paroxysmal atrial fibrillation (HCC) [I48.0]  Mitral stenosis s/p Mechanical MVR (Resolved) [I05.0]                 Anticoagulation Episode Summary       INR check location:  Outside Lab    Preferred lab:      Send INR reminders to:      Comments:            Anticoagulation Care Providers       Provider Role Specialty Phone number    Bird Rodriguez D.O. Referring Family Medicine [phone]    Trey Dubon M.D. Referring Thoracic Surgery (Cardiothoracic Vascular Surgery) [phone]    Taj Allen, PharmD Responsible            Anticoagulation Patient Findings      Left voicemail message to report a supra-therapeutic INR.    Will have pt take decreased dose of warfarin today of 2.5 mg and then   Pt to continue with current warfarin dosing regimen. Requested pt contact the clinic for any s/s of unusual bleeding, bruising, clotting or any changes to diet or medication.    FU INR in 2 week(s).    Leeanne Hickey, AgataD

## 2024-03-26 NOTE — TELEPHONE ENCOUNTER
Labs drawn at 5:02, no results yet.    Self Exam: Abdomen soft, non-tender to palpatation, non-distended

## 2024-05-05 DIAGNOSIS — I48.0 PAROXYSMAL ATRIAL FIBRILLATION (HCC): ICD-10-CM

## 2024-05-06 DIAGNOSIS — I10 ESSENTIAL HYPERTENSION: ICD-10-CM

## 2024-05-06 RX ORDER — METOPROLOL SUCCINATE 25 MG/1
25 TABLET, EXTENDED RELEASE ORAL DAILY
Qty: 90 TABLET | Refills: 0 | Status: SHIPPED | OUTPATIENT
Start: 2024-05-06 | End: 2024-05-30

## 2024-05-06 RX ORDER — WARFARIN SODIUM 5 MG/1
TABLET ORAL
Qty: 30 TABLET | Refills: 0 | Status: SHIPPED | OUTPATIENT
Start: 2024-05-06 | End: 2024-05-30

## 2024-05-06 NOTE — TELEPHONE ENCOUNTER
Is the patient due for a refill? Yes    Was the patient seen the past year? No    Date of last office visit: 4/3/23    Does the patient have an upcoming appointment?  No    Provider to refill:JI    Does the patients insurance require a 100 day supply?  No

## 2024-05-29 ENCOUNTER — ANTICOAGULATION MONITORING (OUTPATIENT)
Dept: VASCULAR LAB | Facility: MEDICAL CENTER | Age: 34
End: 2024-05-29
Payer: COMMERCIAL

## 2024-05-29 ENCOUNTER — HOSPITAL ENCOUNTER (OUTPATIENT)
Dept: LAB | Facility: MEDICAL CENTER | Age: 34
End: 2024-05-29
Payer: COMMERCIAL

## 2024-05-29 DIAGNOSIS — I10 ESSENTIAL HYPERTENSION: ICD-10-CM

## 2024-05-29 DIAGNOSIS — I48.0 PAROXYSMAL ATRIAL FIBRILLATION (HCC): ICD-10-CM

## 2024-05-29 LAB
INR PPP: 3.66 (ref 0.87–1.13)
PROTHROMBIN TIME: 36.9 SEC (ref 12–14.6)

## 2024-05-29 NOTE — PROGRESS NOTES
Anticoagulation Summary  As of 5/29/2024      INR goal:  2.5-3.5   TTR:  62.4% (8.9 y)   INR used for dosing:  3.66 (5/29/2024)   Warfarin maintenance plan:  2.5 mg (5 mg x 0.5) every Mon, Wed, Fri; 5 mg (5 mg x 1) all other days   Weekly warfarin total:  27.5 mg   Plan last modified:  Everett Dangelo PharmD (5/29/2024)   Next INR check:  6/12/2024   Target end date:  Indefinite    Indications    Paroxysmal atrial fibrillation (HCC) [I48.0]  Mitral stenosis s/p Mechanical MVR (Resolved) [I05.0]                 Anticoagulation Episode Summary       INR check location:  Outside Lab    Preferred lab:      Send INR reminders to:      Comments:            Anticoagulation Care Providers       Provider Role Specialty Phone number    Bird Rodriguez D.O. Referring Family Medicine [phone]    Trey Dubon M.D. Referring Thoracic Surgery (Cardiothoracic Vascular Surgery) [phone]    Taj Allen, PharmD Responsible            Anticoagulation Patient Findings    Left voicemail message to report a supratherapeutic INR of 3.66.  Requested pt contact the clinic for any s/s of unusual bleeding, bruising, clotting or any changes to diet or medication.   Pt is to decrease weekly warfarin regimen as detailed above.    FU 2 weeks.  Everett Dangelo, PharmD, BCACP

## 2024-05-30 DIAGNOSIS — I48.0 PAROXYSMAL ATRIAL FIBRILLATION (HCC): ICD-10-CM

## 2024-05-30 RX ORDER — METOPROLOL SUCCINATE 25 MG/1
25 TABLET, EXTENDED RELEASE ORAL DAILY
Qty: 90 TABLET | Refills: 0 | Status: SHIPPED | OUTPATIENT
Start: 2024-05-30

## 2024-05-30 RX ORDER — WARFARIN SODIUM 5 MG/1
TABLET ORAL
Qty: 100 TABLET | Refills: 1 | Status: SHIPPED | OUTPATIENT
Start: 2024-05-30

## 2024-07-19 ENCOUNTER — OFFICE VISIT (OUTPATIENT)
Dept: URGENT CARE | Facility: PHYSICIAN GROUP | Age: 34
End: 2024-07-19
Payer: COMMERCIAL

## 2024-07-19 VITALS
HEIGHT: 62 IN | TEMPERATURE: 98.2 F | RESPIRATION RATE: 20 BRPM | WEIGHT: 159 LBS | DIASTOLIC BLOOD PRESSURE: 64 MMHG | HEART RATE: 85 BPM | OXYGEN SATURATION: 100 % | SYSTOLIC BLOOD PRESSURE: 118 MMHG | BODY MASS INDEX: 29.26 KG/M2

## 2024-07-19 DIAGNOSIS — H60.502 ACUTE OTITIS EXTERNA OF LEFT EAR, UNSPECIFIED TYPE: ICD-10-CM

## 2024-07-19 DIAGNOSIS — H92.12 OTORRHEA OF LEFT EAR: ICD-10-CM

## 2024-07-19 PROCEDURE — 3074F SYST BP LT 130 MM HG: CPT | Performed by: NURSE PRACTITIONER

## 2024-07-19 PROCEDURE — 99213 OFFICE O/P EST LOW 20 MIN: CPT | Performed by: NURSE PRACTITIONER

## 2024-07-19 PROCEDURE — 3078F DIAST BP <80 MM HG: CPT | Performed by: NURSE PRACTITIONER

## 2024-07-19 RX ORDER — OFLOXACIN 3 MG/ML
5 SOLUTION AURICULAR (OTIC) DAILY
Qty: 10 ML | Refills: 0 | Status: SHIPPED | OUTPATIENT
Start: 2024-07-19

## 2024-07-19 ASSESSMENT — FIBROSIS 4 INDEX: FIB4 SCORE: 1.05

## 2024-07-23 DIAGNOSIS — Z79.01 CHRONIC ANTICOAGULATION: ICD-10-CM

## 2024-08-01 ENCOUNTER — HOSPITAL ENCOUNTER (OUTPATIENT)
Dept: LAB | Facility: MEDICAL CENTER | Age: 34
End: 2024-08-01
Payer: COMMERCIAL

## 2024-08-01 ENCOUNTER — ANTICOAGULATION MONITORING (OUTPATIENT)
Dept: ENDOCRINOLOGY | Facility: MEDICAL CENTER | Age: 34
End: 2024-08-01
Payer: COMMERCIAL

## 2024-08-01 DIAGNOSIS — I48.0 PAROXYSMAL ATRIAL FIBRILLATION (HCC): ICD-10-CM

## 2024-08-01 LAB
INR PPP: 3.29 (ref 0.87–1.13)
PROTHROMBIN TIME: 34 SEC (ref 12–14.6)

## 2024-08-01 PROCEDURE — 36415 COLL VENOUS BLD VENIPUNCTURE: CPT

## 2024-08-01 PROCEDURE — 85610 PROTHROMBIN TIME: CPT

## 2024-08-01 NOTE — PROGRESS NOTES
Anticoagulation Summary  As of 8/1/2024      INR goal:  2.5-3.5   TTR:  62.3% (9.1 y)   INR used for dosing:  3.29 (8/1/2024)   Warfarin maintenance plan:  2.5 mg (5 mg x 0.5) every Mon, Wed, Fri; 5 mg (5 mg x 1) all other days   Weekly warfarin total:  27.5 mg   Plan last modified:  Everett Dangelo PharmD (5/29/2024)   Next INR check:  8/29/2024   Target end date:  Indefinite    Indications    Paroxysmal atrial fibrillation (HCC) [I48.0]  Mitral stenosis s/p Mechanical MVR (Resolved) [I05.0]                 Anticoagulation Episode Summary       INR check location:  Outside Lab    Preferred lab:      Send INR reminders to:      Comments:            Anticoagulation Care Providers       Provider Role Specialty Phone number    Bird Rodriguez D.O. Referring Family Medicine [phone]    Trey Dubon M.D. Referring Thoracic Surgery (Cardiothoracic Vascular Surgery) [phone]    Agata SnatosD Responsible            Anticoagulation Patient Findings      INR is therapeutic    Called and left VM for patient.    Requested patient to contact the clinic for any s/sx of unusual bleeding, bruising, clotting or any changes to diet or medications. Unless patient reports any changes that would warrant an adjustment to the plan:  Warfarin Plan: Continue regimen as listed above.    Pt is not on antiplatelet therapy.    Next INR in 4 week(s).    Plan Discussed with Agata PerryD.    Agata MolinaD

## 2024-09-23 DIAGNOSIS — Z79.01 CHRONIC ANTICOAGULATION: ICD-10-CM

## 2024-09-27 ENCOUNTER — ANTICOAGULATION MONITORING (OUTPATIENT)
Dept: VASCULAR LAB | Facility: MEDICAL CENTER | Age: 34
End: 2024-09-27
Payer: COMMERCIAL

## 2024-09-27 ENCOUNTER — HOSPITAL ENCOUNTER (OUTPATIENT)
Dept: LAB | Facility: MEDICAL CENTER | Age: 34
End: 2024-09-27
Payer: COMMERCIAL

## 2024-09-27 DIAGNOSIS — I48.0 PAROXYSMAL ATRIAL FIBRILLATION (HCC): ICD-10-CM

## 2024-09-27 LAB
INR PPP: 3.41 (ref 0.87–1.13)
PROTHROMBIN TIME: 34.7 SEC (ref 12–14.6)

## 2024-09-27 PROCEDURE — 85610 PROTHROMBIN TIME: CPT

## 2024-09-27 PROCEDURE — 36415 COLL VENOUS BLD VENIPUNCTURE: CPT

## 2024-09-27 NOTE — PROGRESS NOTES
Anticoagulation Summary  As of 9/27/2024      INR goal:  2.5-3.5   TTR:  62.9% (9.3 y)   INR used for dosing:  --   Warfarin maintenance plan:  2.5 mg (5 mg x 0.5) every Mon, Wed, Fri; 5 mg (5 mg x 1) all other days   Weekly warfarin total:  27.5 mg   Plan last modified:  Everett Dangelo, PharmD (5/29/2024)   Next INR check:  10/11/2024   Target end date:  Indefinite    Indications    Paroxysmal atrial fibrillation (HCC) [I48.0]  Mitral stenosis s/p Mechanical MVR (Resolved) [I05.0]                 Anticoagulation Episode Summary       INR check location:  Outside Lab    Preferred lab:  --    Send INR reminders to:  --    Comments:  --          Anticoagulation Care Providers       Provider Role Specialty Phone number    Bird Rodriguez D.O. Referring Family Medicine [phone]    Trey Dubon M.D. Referring Thoracic Surgery (Cardiothoracic Vascular Surgery) [phone]    Taj Allen, PharmD Responsible            Anticoagulation Patient Findings    Left voicemail message to report a therapeutic INR of 3.41.  Requested pt contact the clinic for any s/s of unusual bleeding, bruising, clotting or any changes to diet or medication.   Pt is to continue with current warfarin dosing regimen.    Pt is not on antiplatelet therapy    FU 2 weeks.    Suzanna Jones, Pharmacy Intern

## 2024-10-29 DIAGNOSIS — I48.0 PAROXYSMAL ATRIAL FIBRILLATION (HCC): ICD-10-CM

## 2024-10-29 RX ORDER — WARFARIN SODIUM 5 MG/1
TABLET ORAL
Qty: 100 TABLET | Refills: 1 | Status: SHIPPED | OUTPATIENT
Start: 2024-10-29

## 2024-10-31 DIAGNOSIS — I10 ESSENTIAL HYPERTENSION: ICD-10-CM

## 2024-10-31 RX ORDER — METOPROLOL SUCCINATE 25 MG/1
25 TABLET, EXTENDED RELEASE ORAL DAILY
Qty: 14 TABLET | Refills: 0 | Status: SHIPPED | OUTPATIENT
Start: 2024-10-31

## 2024-11-13 ENCOUNTER — OFFICE VISIT (OUTPATIENT)
Dept: CARDIOLOGY | Facility: MEDICAL CENTER | Age: 34
End: 2024-11-13
Attending: INTERNAL MEDICINE
Payer: COMMERCIAL

## 2024-11-13 VITALS
OXYGEN SATURATION: 96 % | SYSTOLIC BLOOD PRESSURE: 100 MMHG | RESPIRATION RATE: 16 BRPM | HEIGHT: 62 IN | WEIGHT: 164 LBS | HEART RATE: 74 BPM | BODY MASS INDEX: 30.18 KG/M2 | DIASTOLIC BLOOD PRESSURE: 68 MMHG

## 2024-11-13 DIAGNOSIS — E78.5 HYPERLIPIDEMIA LDL GOAL <100: ICD-10-CM

## 2024-11-13 DIAGNOSIS — I10 ESSENTIAL HYPERTENSION: ICD-10-CM

## 2024-11-13 DIAGNOSIS — Z79.01 CHRONIC ANTICOAGULATION: ICD-10-CM

## 2024-11-13 DIAGNOSIS — I48.0 PAROXYSMAL ATRIAL FIBRILLATION (HCC): ICD-10-CM

## 2024-11-13 DIAGNOSIS — Z95.0 PACEMAKER: ICD-10-CM

## 2024-11-13 DIAGNOSIS — Z98.890 H/O TRICUSPID VALVE REPAIR: ICD-10-CM

## 2024-11-13 DIAGNOSIS — Z95.2 H/O MITRAL VALVE REPLACEMENT WITH MECHANICAL VALVE: ICD-10-CM

## 2024-11-13 DIAGNOSIS — E78.5 DYSLIPIDEMIA: ICD-10-CM

## 2024-11-13 PROCEDURE — 3074F SYST BP LT 130 MM HG: CPT | Performed by: INTERNAL MEDICINE

## 2024-11-13 PROCEDURE — 3078F DIAST BP <80 MM HG: CPT | Performed by: INTERNAL MEDICINE

## 2024-11-13 PROCEDURE — 99214 OFFICE O/P EST MOD 30 MIN: CPT | Performed by: INTERNAL MEDICINE

## 2024-11-13 PROCEDURE — 99212 OFFICE O/P EST SF 10 MIN: CPT | Performed by: INTERNAL MEDICINE

## 2024-11-13 RX ORDER — METOPROLOL SUCCINATE 25 MG/1
25 TABLET, EXTENDED RELEASE ORAL DAILY
Qty: 14 TABLET | Refills: 0 | Status: CANCELLED | OUTPATIENT
Start: 2024-11-13

## 2024-11-13 RX ORDER — ATORVASTATIN CALCIUM 20 MG/1
20 TABLET, FILM COATED ORAL EVERY EVENING
Qty: 90 TABLET | Refills: 3 | Status: SHIPPED | OUTPATIENT
Start: 2024-11-13

## 2024-11-13 ASSESSMENT — ENCOUNTER SYMPTOMS
HEADACHES: 0
EYES NEGATIVE: 1
VOMITING: 0
NEUROLOGICAL NEGATIVE: 1
CARDIOVASCULAR NEGATIVE: 1
FEVER: 0
BLURRED VISION: 0
BRUISES/BLEEDS EASILY: 0
PALPITATIONS: 0
DEPRESSION: 0
MYALGIAS: 0
WEAKNESS: 0
CHILLS: 0
SHORTNESS OF BREATH: 0
COUGH: 0
FOCAL WEAKNESS: 0
NAUSEA: 0
RESPIRATORY NEGATIVE: 1
CLAUDICATION: 0
DIZZINESS: 0
ABDOMINAL PAIN: 0
WEIGHT LOSS: 0
CONSTITUTIONAL NEGATIVE: 1
DOUBLE VISION: 0
MUSCULOSKELETAL NEGATIVE: 1
NERVOUS/ANXIOUS: 0
PSYCHIATRIC NEGATIVE: 1
GASTROINTESTINAL NEGATIVE: 1

## 2024-11-13 ASSESSMENT — FIBROSIS 4 INDEX: FIB4 SCORE: 1.05

## 2024-11-13 NOTE — TELEPHONE ENCOUNTER
Is the patient due for a refill? Yes    Was the patient seen the past year? Yes    Date of last office visit: 11/13/2024    Does the patient have an upcoming appointment?  Yes   If yes, When? 02/10/2025    Provider to refill:IJ    Does the patient have [facility] and need 100-day supply? (This applies to ALL medications) Patient does not have SCP

## 2024-11-13 NOTE — PROGRESS NOTES
Chief Complaint   Patient presents with    Atrial Fibrillation     F/V Dx: Paroxysmal atrial fibrillation (HCC)       Other     F/V Dx: Chronic Anticoagulation       Subjective     April Demi Boston is a 34 y.o. female who presents today for annual follow up of mitral valve replacement, tricuspid valve repair, chronic anticoagulation therapy, atrial fibrillation, pacemaker placement, dyslipidemia.    Since the patient's last visit on 04/03/24, she has been doing well clinically from cardiac standpoint. She denies fatigue, chest pain, shortness of breath, palpitations, lower extremity edema, dizziness or syncope. She has not been exercising as much.     Past Medical History:   Diagnosis Date    Anemia 4/17/2011    CHF (congestive heart failure) (HCC)     Chronic anticoagulation     CVA (cerebral infarction) 2011    Elbow fracture     History of atrial fibrillation     Hyperlipidemia     Left atrial thrombus     Migraine     occasional    Mitral stenosis     s/p mechanical MVR on coumadin    Pacemaker     [employer] for 3rd degree AVB    Pulmonary hypertension (HCC)     Third degree heart block (HCC)     Tricuspid regurgitation     s/p repair     Past Surgical History:   Procedure Laterality Date    ORIF, ELBOW Right 1/26/2016    Procedure: ELBOW ORIF olecranon;  Surgeon: Fausto Nunez M.D.;  Location: SURGERY [facility];  Service:     PELVISCOPY  9/6/2011    Performed by SHERRY HEREDIA at SURGERY SAME DAY [facility] ORS    INTRA UTERINE DEVICE REMOVAL  9/6/2011    Performed by SHERRY HEREDIA at SURGERY SAME DAY [facility]    TUBAL COAGULATION LAPAROSCOPIC BILATERAL  9/6/2011    Performed by SHERRY HEREDIA at SURGERY SAME DAY [facility]    MITRAL VALVE REPLACE  4/25/2011    Performed by RALPH MCNEIL at SURGERY [facility]    TRICUSPID VALVE REPAIR  4/25/2011    Performed by RALPH MCNEIL at SURGERY [facility]    MASS EXCISION GENERAL  4/25/2011    Performed by EWA  RALPH at [facility]ERY [facility]    PRIMARY C SECTION  4/6/2011    Performed by DEBBIE MAXWELL at LABOR AND DELIVERY    PACEMAKER INSERTION  2011     Family History   Problem Relation Age of Onset    Diabetes Maternal Grandmother         IDDM    Hypertension Maternal Grandmother         meds    Heart Disease Maternal Grandfather     Diabetes Paternal Grandmother         esrd     Social History     Socioeconomic History    Marital status: Single     Spouse name: Not on file    Number of children: Not on file    Years of education: Not on file    Highest education level: Not on file   Occupational History    Not on file   Tobacco Use    Smoking status: Never    Smokeless tobacco: Never   Vaping Use    Vaping status: Never Used   Substance and Sexual Activity    Alcohol use: No     Alcohol/week: 0.0 oz    Drug use: No    Sexual activity: Not Currently     Partners: Male   Other Topics Concern    Not on file   Social History Narrative    Not on file     Social Drivers of Health     Financial Resource Strain: Not on file   Food Insecurity: Not on file   Transportation Needs: Not on file   Physical Activity: Not on file   Stress: Not on file   Social Connections: Not on file   Intimate Partner Violence: Not on file   Housing Stability: Not on file     Allergies   Allergen Reactions    No Known Drug Allergy      (Medications reviewed.)  Outpatient Encounter Medications as of 11/13/2024   Medication Sig Dispense Refill    metoprolol SR (TOPROL XL) 25 MG TABLET SR 24 HR TAKE 1 TABLET BY MOUTH EVERY DAY. PATIENT MUST BE SEEN FOR ADDITIONAL REFILLS. 14 Tablet 0    warfarin (COUMADIN) 5 MG Tab Take one-half to one tablet by mouth daily or as directed by anticoagulation clinic 100 Tablet 1    atorvastatin (LIPITOR) 20 MG Tab Take 1 Tablet by mouth every evening. Please call to schedule follow up appointment for further refills. Please call [phone]. Thank you. 90 Tablet 3    Cholecalciferol (D3 ADULT PO) Take  by mouth.       "therapeutic multivitamin-minerals (THERAGRAN-M) Tab Take 1 Tab by mouth every day.      ofloxacin otic sol (FLOXIN OTIC) 0.3 % Solution Administer 5 Drops into affected ear(s) every day. (Patient not taking: Reported on 11/13/2024) 10 mL 0     No facility-administered encounter medications on file as of 11/13/2024.     Review of Systems   Constitutional: Negative.  Negative for chills, fever, malaise/fatigue and weight loss.   HENT: Negative.  Negative for hearing loss.    Eyes: Negative.  Negative for blurred vision and double vision.   Respiratory: Negative.  Negative for cough and shortness of breath.    Cardiovascular: Negative.  Negative for chest pain, palpitations, claudication and leg swelling.   Gastrointestinal: Negative.  Negative for abdominal pain, nausea and vomiting.   Genitourinary: Negative.  Negative for dysuria and urgency.   Musculoskeletal: Negative.  Negative for joint pain and myalgias.   Skin: Negative.  Negative for itching and rash.   Neurological: Negative.  Negative for dizziness, focal weakness, weakness and headaches.   Endo/Heme/Allergies: Negative.  Does not bruise/bleed easily.   Psychiatric/Behavioral: Negative.  Negative for depression. The patient is not nervous/anxious.               Objective     /68 (BP Location: Left arm, Patient Position: Sitting, BP Cuff Size: Adult)   Pulse 74   Resp 16   Ht 1.575 m (5' 2\")   Wt 74.4 kg (164 lb)   SpO2 96%   BMI 30.00 kg/m²     Physical Exam  Constitutional:       Appearance: Normal appearance. She is well-developed and normal weight.   HENT:      Head: Normocephalic and atraumatic.   Neck:      Vascular: No JVD.   Cardiovascular:      Rate and Rhythm: Normal rate and regular rhythm.      Heart sounds: Normal heart sounds.   Pulmonary:      Effort: Pulmonary effort is normal.      Breath sounds: Normal breath sounds.   Abdominal:      General: Bowel sounds are normal.      Palpations: Abdomen is soft.      Comments: No " hepatosplenomegaly.   Musculoskeletal:         General: Normal range of motion.   Lymphadenopathy:      Cervical: No cervical adenopathy.   Skin:     General: Skin is warm and dry.   Neurological:      Mental Status: She is alert and oriented to person, place, and time.     Normal mechanical click.       CARDIAC STUDIES/PROCEDURES:     CARDIAC CATHETERIZATION CONCLUSIONS by Cory Humphries (11)  Normal coronary arteries.     ECHOCARDIOGRAM CONCLUSIONS (22)  Prior echo on 2016, compared to the report of the prior study,   there has been no significant change.   Normal left ventricular systolic function.  The left ventricular ejection fraction is visually estimated to be 65%.  Known mitral valve mechanical prosthesis which is functioning normally   with appropriate transvalvular gradient.  Mean gradient of 4 mmHg.  Estimated right ventricular systolic pressure is 20 mmHg.    ECHOCARDIOGRAM CONCLUSIONS (16)  Compared to the images of the study done 2012 - there has been no significant change.   Known mitral valve mechanical prosthesis which is functioning normally with appropriate transvalvular gradient.   No mitral regurgitation.   PPG: 10.98 mmHg.  MP.18 mmHg.  Normal left ventricular chamber size. Normal left ventricular wall   thickness. Normal left ventricular systolic function.   Left ventricular ejection fraction is visually estimated to be 60%.   Normal regional wall motion.  No significant diastolic dysfunction.  Right heart pressures are normal.   The aortic root is normal.  Ascending aorta diameter is 2.4 cm.     EKG performed on (18) EKG shows paced rhythm.     Laboratory results of (22) Cholesterol profile of 166/54/49/106 mg/dL noted.  Laboratory results of (02/10/21) Cholesterol profile of 155/90/42/95 mg/dL noted.     PACEMAKER PLACEMENT by Mathieu Castillo (18)  [employer] dual chamber pacemaker.    Assessment & Plan     1. H/O mitral  valve replacement with mechanical valve        2. H/O tricuspid valve repair        3. Chronic anticoagulation        4. Paroxysmal atrial fibrillation (HCC)        5. Pacemaker [employer] for 3 degree heart block        6. Dyslipidemia            Medical Decision Making: Today's Assessment/Status/Plan:        History of double valve surgery (mitral valve replacement with # 27-mm [employer] mechanical valve and tricuspid valve repair with 34-mm C-E rigid annuloplasty ring; left atrial thrombectomy and [facility]) on chronic anticoagulation therapy (warfarin): She is a 34 year old female with history of mitral valve replacement, tricuspid valve repair, chronic anticoagulation therapy, atrial fibrillation, pacemaker placement, dyslipidemia. She is clinically doing well from valvular standpoint. We will repeat an echocardiogram.  Atrial fibrillation with pacemaker placement on chronic anticoagulation therapy: She is doing well without palpations.   Hyperlipidemia: She is doing well on statin therapy without myalgia symptoms.  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     We will follow up in 3 months.    CC Monalisa Gallego PAC

## 2024-11-13 NOTE — TELEPHONE ENCOUNTER
MARISSA    Received request via: Patient    Was the patient seen in the last year in this department? Yes    Does the patient have an active prescription (recently filled or refills available) for medication(s) requested? No    Pharmacy Name: [facility]/PHARMACY #[id number] - [place], [place] - [address] [[id number]]     Does the patient have [facility] and need 100-day supply? (This applies to ALL medications) Patient does not have SCP    Thank you,  Farzana RAO    Unable to assess, patient intubated

## 2024-11-14 DIAGNOSIS — Z79.01 CHRONIC ANTICOAGULATION: ICD-10-CM

## 2024-11-19 DIAGNOSIS — I10 ESSENTIAL HYPERTENSION: ICD-10-CM

## 2024-11-21 RX ORDER — METOPROLOL SUCCINATE 25 MG/1
25 TABLET, EXTENDED RELEASE ORAL DAILY
Qty: 90 TABLET | Refills: 3 | Status: SHIPPED | OUTPATIENT
Start: 2024-11-21

## 2024-12-27 ENCOUNTER — HOSPITAL ENCOUNTER (EMERGENCY)
Facility: MEDICAL CENTER | Age: 34
End: 2024-12-28
Attending: STUDENT IN AN ORGANIZED HEALTH CARE EDUCATION/TRAINING PROGRAM
Payer: COMMERCIAL

## 2024-12-27 ENCOUNTER — TELEPHONE (OUTPATIENT)
Dept: CARDIOLOGY | Facility: MEDICAL CENTER | Age: 34
End: 2024-12-27
Payer: COMMERCIAL

## 2024-12-27 VITALS
BODY MASS INDEX: 29.82 KG/M2 | HEIGHT: 62 IN | HEART RATE: 81 BPM | TEMPERATURE: 97.9 F | OXYGEN SATURATION: 98 % | DIASTOLIC BLOOD PRESSURE: 60 MMHG | RESPIRATION RATE: 18 BRPM | WEIGHT: 162.04 LBS | SYSTOLIC BLOOD PRESSURE: 102 MMHG

## 2024-12-27 DIAGNOSIS — R79.1 SUPRATHERAPEUTIC INR: ICD-10-CM

## 2024-12-27 LAB
INR PPP: 4.54 (ref 0.87–1.13)
PROTHROMBIN TIME: 43.4 SEC (ref 12–14.6)

## 2024-12-27 PROCEDURE — 85610 PROTHROMBIN TIME: CPT

## 2024-12-27 PROCEDURE — 99283 EMERGENCY DEPT VISIT LOW MDM: CPT

## 2024-12-27 PROCEDURE — 36415 COLL VENOUS BLD VENIPUNCTURE: CPT

## 2024-12-27 ASSESSMENT — PAIN DESCRIPTION - PAIN TYPE: TYPE: ACUTE PAIN

## 2024-12-27 NOTE — TELEPHONE ENCOUNTER
Phone Number Called:  [phone]     Call outcome: Spoke to patient regarding message below.    Message: Called to discuss pt message      Pt needs to contact vascular medicine for anticoagulation monitoring. She has patient message from 11/14 /24 requesting she make an appointment. [phone]. Last PT was drawn in September, she will go tomorrow. Advised bleeding precautions and ER for any blood in urine, stool, or worsening.

## 2024-12-27 NOTE — TELEPHONE ENCOUNTER
MARISSA Romero: Sameer Boston    Topic/issue: Patient called because she is currently traveling, but is seeing blood in her eyes. She said its not effecting her vision but she can see the blood and is very concerned about it. She is not having other symptoms and is unable to go the ER she is currently driving to [place].     Please advise.     Callback Number: [phone]    Thank you,     Isabell PORTER

## 2024-12-28 ENCOUNTER — ANTICOAGULATION MONITORING (OUTPATIENT)
Dept: VASCULAR LAB | Facility: MEDICAL CENTER | Age: 34
End: 2024-12-28
Payer: COMMERCIAL

## 2024-12-28 DIAGNOSIS — I48.0 PAROXYSMAL ATRIAL FIBRILLATION (HCC): ICD-10-CM

## 2024-12-28 NOTE — ED TRIAGE NOTES
"Chief Complaint   Patient presents with    Eye Pain     Pt reports redness in left eye noticed at 1330. Pt reports mild discomfort. No trauma.   Denies changes in vision.        33 yo F to triage for above complaint.      Pt placed in lobby. Pt educated on triage process. Pt encouraged to alert staff for any changes.     Patient and staff wearing appropriate PPE    /65   Pulse 86   Temp 36.6 °C (97.9 °F) (Temporal)   Resp 14   Ht 1.575 m (5' 2\")   Wt 73.5 kg (162 lb 0.6 oz)   SpO2 97%   BMI 29.64 kg/m² '  "

## 2024-12-28 NOTE — ED NOTES
Pt discharged home, discharge and follow up care instructions given, pt verbalized understanding, pt ambulated out of ED independently.

## 2024-12-28 NOTE — DISCHARGE INSTRUCTIONS
As we discussed you should hold your warfarin tomorrow and only take 2.5 mg Sunday and follow-up in the anticoagulation clinic on Monday.     Return to the ER with any worsening bleeding.

## 2024-12-28 NOTE — ED PROVIDER NOTES
ED Provider Note    CHIEF COMPLAINT  Chief Complaint   Patient presents with    Eye Pain     Pt reports redness in left eye noticed at 1330. Pt reports mild discomfort. No trauma.   Denies changes in vision.        EXTERNAL RECORDS REVIEWED  Outpatient Notes patient seen by cardiology on an outpatient basis 11/13/2024 for follow-up of paroxysmal atrial fibrillation on chronic anticoagulation status post tricuspid valve repair.  Pacemaker placed for third-degree heart block.    HPI/ROS  LIMITATION TO HISTORY   Select: : None      Sameer Boston is a 34 y.o. female who presents to the emergency department for evaluation of left eye redness patient states that at 130 she was driving, looked in the mirror and saw a small spot of redness to the white of her eye nasal aspect, left eye.  She denies any falls injuries or trauma to the eye, heavy sneezing or vomiting prior to this.  She states that this evening she noted that the redness had gotten bigger prompting presentation.  She reports some mild associated irritation.  She denies vision change she denies fevers or chills.,  Bruising around the eye.  She notes a small amount of bruising to her right thigh which is baseline for her given that she takes warfarin.  She denies any additional bleeding.  She notes that she has not had her INR checked in about 2 months.  She states that she currently is taking 5 mg all days except Monday and Friday when she takes 2.5 mg.    PAST MEDICAL HISTORY   has a past medical history of Anemia (4/17/2011), CHF (congestive heart failure) (HCC), Chronic anticoagulation, CVA (cerebral infarction) (2011), Elbow fracture, History of atrial fibrillation, Hyperlipidemia, Left atrial thrombus, Migraine, Mitral stenosis, Pacemaker, Pulmonary hypertension (HCC), Third degree heart block (HCC), and Tricuspid regurgitation.    SURGICAL HISTORY   has a past surgical history that includes primary c section (4/6/2011); mitral valve replace  "(4/25/2011); tricuspid valve repair (4/25/2011); mass excision general (4/25/2011); pelviscopy (9/6/2011); intra uterine device removal (9/6/2011); tubal coagulation laparoscopic bilateral (9/6/2011); orif, elbow (Right, 1/26/2016); and pacemaker insertion (2011).    FAMILY HISTORY  Family History   Problem Relation Age of Onset    Diabetes Maternal Grandmother         IDDM    Hypertension Maternal Grandmother         meds    Heart Disease Maternal Grandfather     Diabetes Paternal Grandmother         esrd       SOCIAL HISTORY  Social History     Tobacco Use    Smoking status: Never    Smokeless tobacco: Never   Vaping Use    Vaping status: Never Used   Substance and Sexual Activity    Alcohol use: No     Alcohol/week: 0.0 oz    Drug use: No    Sexual activity: Not Currently     Partners: Male       CURRENT MEDICATIONS  Home Medications       Reviewed by Leti Rneae R.N. (Registered Nurse) on 12/27/24 at 2226  Med List Status: Not Addressed     Medication Last Dose Status   atorvastatin (LIPITOR) 20 MG Tab  Active   Cholecalciferol (D3 ADULT PO)  Active   metoprolol SR (TOPROL XL) 25 MG TABLET SR 24 HR  Active   ofloxacin otic sol (FLOXIN OTIC) 0.3 % Solution  Active   therapeutic multivitamin-minerals (THERAGRAN-M) Tab  Active   warfarin (COUMADIN) 5 MG Tab  Active                    ALLERGIES  Allergies   Allergen Reactions    No Known Drug Allergy        PHYSICAL EXAM  VITAL SIGNS: /65   Pulse 86   Temp 36.6 °C (97.9 °F) (Temporal)   Resp 14   Ht 1.575 m (5' 2\")   Wt 73.5 kg (162 lb 0.6 oz)   SpO2 97%   BMI 29.64 kg/m²    Constitutional: No acute distress.  HENT: Normocephalic, Atraumatic, Bilateral external ears normal. Nose normal.   Eyes: Pupils are equal and reactive.  Extraocular muscles intact.  25% subconjunctival hemorrhage to the left sclera nasal aspect.  No hyphema.  Visual acuity preserved.  Visual fields intact in all 4 quadrants.  Heart: Regular rate and rythm,   Lungs: No " respiratory distress  GI: Soft nontender nondistended   Musculoskeletal: No obvious deformity. No leg edema.  Skin: Warm, Dry, No erythema, No rash.   Neurologic: Alert and oriented x 3, Cranial nerves III through XII grossly intact no sensory deficit no cerebellar dysfunction.   Psychiatric: Appropriate affect for situation      EKG/LABS  Labs Reviewed   PROTHROMBIN TIME - Abnormal; Notable for the following components:       Result Value    PT 43.4 (*)     INR 4.54 (*)     All other components within normal limits       COURSE & MEDICAL DECISION MAKING    ASSESSMENT, COURSE AND PLAN  Care Narrative:     Patient presents to the emergency department for evaluation of redness in her left eye.  Examination consistent with a subconjunctival hemorrhage.  Only encompasses about 25% of the sclera without evidence of associated hyphema, open globe or significant traumatic injury preceding with a small area of bleeding.  Patient is notably anticoagulated and has not had her INR checked in quite some time.  No report of any additional gross hemorrhage or any objective evidence of clinically significant bleeding at this time.  I did elect to check her INR which is supratherapeutic and likely contributed to the formation of this subconjunctival hemorrhage.  She is about 1 point above goal.  No indication for vitamin K or factor replacement at this time.  Discussed this with pharmacy who recommend with holding tomorrow's dose of her anticoagulant, half dose the following day and following up in Coumadin clinic on Monday.  I discussed this plan with the patient who is amenable to such and states that she yes she is indeed able to follow-up in clinic on Monday.  Return precautions discussed and all questions answered she was discharged stable condition.      ADDITIONAL PROBLEMS MANAGED  None    DISPOSITION AND DISCUSSIONS  I have discussed management of the patient with the following physicians and [facility]USTIN's: None    Discussion of  management with other QHP or appropriate source(s): Pharmacy        FINAL DIAGNOSIS  1. Supratherapeutic INR         Electronically signed by: Kirk Mcnamara M.D., 12/27/2024 11:11 PM

## 2024-12-28 NOTE — PROGRESS NOTES
OP Anticoagulation Service Note    Date: 2024    Anticoagulation Summary  As of 2024      INR goal:  2.5-3.5   TTR:  61.5% (9.5 y)   INR used for dosin.54 (2024)   Warfarin maintenance plan:  2.5 mg (5 mg x 0.5) every Mon, Wed, Fri; 5 mg (5 mg x 1) all other days   Weekly warfarin total:  27.5 mg   Plan last modified:  Everett Dangelo, PharmD (2024)   Next INR check:  1/3/2025   Target end date:  Indefinite    Indications    Paroxysmal atrial fibrillation (HCC) [I48.0]  Mitral stenosis s/p Mechanical MVR (Resolved) [I05.0]                 Anticoagulation Episode Summary       INR check location:  Outside Lab    Preferred lab:  --    Send INR reminders to:  --    Comments:  --          Anticoagulation Care Providers       Provider Role Specialty Phone number    Bird Rodriguez D.O. Referring Family Medicine [phone]    Trey Dubon M.D. Referring Thoracic Surgery (Cardiothoracic Vascular Surgery) [phone]    Taj Allen, PharmD Responsible            Anticoagulation Patient Findings        Patient's preferred phone number:  Sameer Simms Dannyitzel  [address]  [address]  [facility] [address]  [phone]        HPI:   The reason for today's call is to prevent morbidity and mortality from a blood clot and/or stroke and to reduce the risk of bleeding while on a anticoagulant.       Per ED note:  Patient presents to the emergency department for evaluation of redness in her left eye. Examination consistent with a subconjunctival hemorrhage. Only encompasses about 25% of the sclera without evidence of associated hyphema, open globe or significant traumatic injury preceding with a small area of bleeding. Patient is notably anticoagulated and has not had her INR checked in quite some time. No report of any additional gross hemorrhage or any objective evidence of clinically significant bleeding at this time. I did elect to check her INR which is supratherapeutic and likely contributed  to the formation of this subconjunctival hemorrhage. She is about 1 point above goal. No indication for vitamin K or factor replacement at this time. Discussed this with pharmacy who recommend with holding tomorrow's dose of her anticoagulant, half dose the following day and following up in Coumadin clinic on Monday. I discussed this plan with the patient who is amenable to such and states that she yes she is indeed able to follow-up in clinic on Monday. Return precautions discussed and all questions answered she was discharged stable condition.     Care Team    PCP:  Monalisa Lorenzo P.A.-C.  [address]  [facility] [address] [phone]      Patient Care Team                Monalisa Lorenzo P.A.-C. PCP - General, Family Medicine [phone] [address] [place] [address]    [facility] Consulting Physician [phone]     [address] [facility] [address]            Assessment:     INR goal for this PT: 2.5-3.5  INR   Date Value Ref Range Status   12/27/2024 4.54 (H) 0.87 - 1.13 Final     Comment:     INR - Non-therapeutic Reference Range: 0.87-1.13  INR - Therapeutic Reference Range: 2.0-4.0         Lab Results   Component Value Date/Time    BUN 10 12/06/2022 06:36 AM    CREATININE 0.61 12/06/2022 06:36 AM     Lab Results   Component Value Date/Time    HEMOGLOBIN 15.6 09/07/2023 06:54 AM    HEMATOCRIT 47.8 (H) 09/07/2023 06:54 AM    PLATELETCT 227 09/07/2023 06:54 AM    ALKPHOSPHAT 54 12/06/2022 06:36 AM    ASTSGOT 28 12/06/2022 06:36 AM    ALTSGPT 18 12/06/2022 06:36 AM          Current Outpatient Medications:     metoprolol SR, 25 mg, Oral, DAILY    atorvastatin, 20 mg, Oral, Q EVENING    warfarin, Take one-half to one tablet by mouth daily or as directed by anticoagulation clinic    ofloxacin otic sol, 5 Drop, Otic, DAILY (Patient not taking: Reported on 11/13/2024)    Cholecalciferol (D3 ADULT PO), Take  by mouth.    therapeutic multivitamin-minerals, 1 Tablet,  Oral, DAILY    [id number] Stroke Risk Points: 4   Values used to calculate this score:    Points  Metrics       0        Has Congestive Heart Failure: No       1        Has Hypertension: Yes       0        Age: 34       0        Has Diabetes: No       2        Had Stroke: No                 Had TIA: No                 Had Thromboembolism: Yes       0        Has Vascular Disease: No       1        Clinically Relevant Sex: Female     No data recorded       Plan:     Hold warfarin today then resume your normal weekly warfarin dose starting Sunday    Or  Alternatively,  hold warfarin today, half tablet tomorrow then resume normal weekly warfarin dose starting Monday,  per ED note as seen above     I was only able to leave a voice message. Left a callback number      Follow-up:     On date seen above    Additional information discussed with patient:     Asked patient to please call the anticoagulation clinic if they have any signs/symptoms of bleeding and/or thrombosis or any changes to diet or medications.      National recommendations regarding anticoagulation therapy:     The CHEST guidelines recommends frequent INR monitoring at regular intervals (a few days up to a max of 12 weeks) to ensure patients are on the proper dose of warfarin, and patients are not having any complications from therapy.  INRs can dramatically change over a short time period due to diet, medications, and medical conditions.       [facility]  Phone: [phone]  Fax: [phone]  On call: [phone]  General scheduling/information [phone]  For emergencies please dial [phone]  Please do not use [facility] for urgent matters, call the phone numbers listed above.    This note was created using voice recognition software (Dragon). The accuracy of the dictation is limited by the abilities of the software. I have reviewed the note prior to signing, however some errors in grammar and context are still possible.  If you have any questions related to this note please do not hesitate to contact our office.

## 2025-01-08 ENCOUNTER — HOSPITAL ENCOUNTER (OUTPATIENT)
Dept: LAB | Facility: MEDICAL CENTER | Age: 35
End: 2025-01-08
Attending: NURSE PRACTITIONER
Payer: COMMERCIAL

## 2025-01-08 ENCOUNTER — ANTICOAGULATION MONITORING (OUTPATIENT)
Dept: VASCULAR LAB | Facility: MEDICAL CENTER | Age: 35
End: 2025-01-08
Payer: COMMERCIAL

## 2025-01-08 DIAGNOSIS — I48.0 PAROXYSMAL ATRIAL FIBRILLATION (HCC): ICD-10-CM

## 2025-01-08 LAB
INR PPP: 2.82 (ref 0.87–1.13)
PROTHROMBIN TIME: 29.8 SEC (ref 12–14.6)

## 2025-01-08 PROCEDURE — 85610 PROTHROMBIN TIME: CPT

## 2025-01-08 PROCEDURE — 36415 COLL VENOUS BLD VENIPUNCTURE: CPT

## 2025-01-08 NOTE — PROGRESS NOTES
Anticoagulation Summary  As of 2025      INR goal:  2.5-3.5   TTR:  61.4% (9.5 y)   INR used for dosin.82 (2025)   Warfarin maintenance plan:  2.5 mg (5 mg x 0.5) every Mon, Wed, Fri; 5 mg (5 mg x 1) all other days   Weekly warfarin total:  27.5 mg   Plan last modified:  Everett Dangelo, PharmD (2024)   Next INR check:  2025   Target end date:  Indefinite    Indications    Paroxysmal atrial fibrillation (HCC) [I48.0]  Mitral stenosis s/p Mechanical MVR (Resolved) [I05.0]                 Anticoagulation Episode Summary       INR check location:  Outside Lab    Preferred lab:  --    Send INR reminders to:  --    Comments:  --          Anticoagulation Care Providers       Provider Role Specialty Phone number    Bird Rodriguez D.O. Referring Family Medicine [phone]    Trey Dubon M.D. Referring Thoracic Surgery (Cardiothoracic Vascular Surgery) [phone]    Taj Allen, PharmD Responsible            Anticoagulation Patient Findings      INR is therapeutic  Reason(s) for out of range INR today: N/A      Called and left VM for patient.    Pt is not on antiplatelet therapy.    Requested patient to contact the clinic for any s/sx of unusual bleeding, bruising, clotting or any changes to diet or medications. Unless patient reports any changes that would warrant an adjustment to the plan:  Warfarin Plan: Continue regimen as listed above.    Next INR in 2 week(s).    Leeanne Hickey, AgataD

## 2025-01-22 ENCOUNTER — ANTICOAGULATION MONITORING (OUTPATIENT)
Dept: VASCULAR LAB | Facility: MEDICAL CENTER | Age: 35
End: 2025-01-22
Payer: COMMERCIAL

## 2025-01-22 ENCOUNTER — HOSPITAL ENCOUNTER (OUTPATIENT)
Dept: LAB | Facility: MEDICAL CENTER | Age: 35
End: 2025-01-22
Attending: NURSE PRACTITIONER
Payer: COMMERCIAL

## 2025-01-22 DIAGNOSIS — I48.0 PAROXYSMAL ATRIAL FIBRILLATION (HCC): ICD-10-CM

## 2025-01-22 LAB
INR PPP: 4.23 (ref 0.87–1.13)
PROTHROMBIN TIME: 41.1 SEC (ref 12–14.6)

## 2025-01-22 PROCEDURE — 36415 COLL VENOUS BLD VENIPUNCTURE: CPT

## 2025-01-22 PROCEDURE — 85610 PROTHROMBIN TIME: CPT

## 2025-01-22 NOTE — PROGRESS NOTES
Anticoagulation Summary  As of 2025      INR goal:  2.5-3.5   TTR:  61.3% (9.6 y)   INR used for dosin.23 (2025)   Warfarin maintenance plan:  2.5 mg (5 mg x 0.5) every Mon, Wed, Fri; 5 mg (5 mg x 1) all other days   Weekly warfarin total:  27.5 mg   Plan last modified:  Everett Dangelo, PharmD (2024)   Next INR check:  2025   Target end date:  Indefinite    Indications    Paroxysmal atrial fibrillation (HCC) [I48.0]  Mitral stenosis s/p Mechanical MVR (Resolved) [I05.0]                 Anticoagulation Episode Summary       INR check location:  Outside Lab    Preferred lab:  --    Send INR reminders to:  --    Comments:  --          Anticoagulation Care Providers       Provider Role Specialty Phone number    Bird Rodriguez D.O. Referring Family Medicine [phone]    Trey Dubon M.D. Referring Thoracic Surgery (Cardiothoracic Vascular Surgery) [phone]    Taj Allen, PharmD Responsible            Anticoagulation Patient Findings      INR is supratherapeutic  Reason(s) for out of range INR today: No obvious causes      Called and left VM for patient.    Pt is not on antiplatelet therapy.    Requested patient to contact the clinic for any s/sx of unusual bleeding, bruising, clotting or any changes to diet or medications. Unless patient reports any changes that would warrant an adjustment to the plan:  Warfarin Plan: Hold x1 day, then Continue regimen as listed above.    Next INR in 1 week(s).    Hedy Colvin, AgataD

## 2025-02-10 ENCOUNTER — HOSPITAL ENCOUNTER (OUTPATIENT)
Dept: CARDIOLOGY | Facility: MEDICAL CENTER | Age: 35
End: 2025-02-10
Attending: INTERNAL MEDICINE
Payer: COMMERCIAL

## 2025-02-10 DIAGNOSIS — Z95.2 H/O MITRAL VALVE REPLACEMENT WITH MECHANICAL VALVE: ICD-10-CM

## 2025-02-10 DIAGNOSIS — Z98.890 H/O TRICUSPID VALVE REPAIR: ICD-10-CM

## 2025-02-10 LAB
LV EJECT FRACT  99904: 60
LV EJECT FRACT MOD 2C 99903: 56.9
LV EJECT FRACT MOD 4C 99902: 63.36
LV EJECT FRACT MOD BP 99901: 59.49

## 2025-02-10 PROCEDURE — 93306 TTE W/DOPPLER COMPLETE: CPT | Mod: 26 | Performed by: INTERNAL MEDICINE

## 2025-02-10 PROCEDURE — 93306 TTE W/DOPPLER COMPLETE: CPT

## 2025-02-20 DIAGNOSIS — Z79.01 CHRONIC ANTICOAGULATION: ICD-10-CM

## 2025-04-11 ENCOUNTER — TELEPHONE (OUTPATIENT)
Dept: VASCULAR LAB | Facility: MEDICAL CENTER | Age: 35
End: 2025-04-11
Payer: COMMERCIAL

## 2025-04-11 DIAGNOSIS — Z79.01 CHRONIC ANTICOAGULATION: ICD-10-CM

## 2025-04-11 NOTE — TELEPHONE ENCOUNTER
YANDY TORRES    Caller: Sameer Boston    Topic/issue: The patient is requesting that her standing lab order be updated as it is . Please advise.     Callback Number: [phone]    Thank you,  Cleveland MCCURDY

## 2025-04-11 NOTE — TELEPHONE ENCOUNTER
New standing order for INR placed.             Farrukh Queen, Medical Assistant   [facility]   Ph: [phone]  Fx: [phone]

## 2025-04-16 ENCOUNTER — ANTICOAGULATION MONITORING (OUTPATIENT)
Dept: MEDICAL GROUP | Facility: PHYSICIAN GROUP | Age: 35
End: 2025-04-16
Payer: COMMERCIAL

## 2025-04-16 ENCOUNTER — HOSPITAL ENCOUNTER (OUTPATIENT)
Dept: LAB | Facility: MEDICAL CENTER | Age: 35
End: 2025-04-16
Payer: COMMERCIAL

## 2025-04-16 DIAGNOSIS — Z79.01 CHRONIC ANTICOAGULATION: ICD-10-CM

## 2025-04-16 DIAGNOSIS — I48.0 PAROXYSMAL ATRIAL FIBRILLATION (HCC): ICD-10-CM

## 2025-04-16 LAB
INR PPP: 3.68 (ref 0.87–1.13)
PROTHROMBIN TIME: 36.8 SEC (ref 12–14.6)

## 2025-04-16 PROCEDURE — 36415 COLL VENOUS BLD VENIPUNCTURE: CPT

## 2025-04-16 PROCEDURE — 85610 PROTHROMBIN TIME: CPT

## 2025-04-16 NOTE — PROGRESS NOTES
Anticoagulation Summary  As of 4/16/2025      INR goal:  2.5-3.5   TTR:  59.9% (9.8 y)   INR used for dosing:  3.68 (4/16/2025)   Warfarin maintenance plan:  2.5 mg (5 mg x 0.5) every Mon, Wed, Fri; 5 mg (5 mg x 1) all other days   Weekly warfarin total:  27.5 mg   Plan last modified:  Everett Dangelo, PharmD (5/29/2024)   Next INR check:  4/23/2025   Target end date:  Indefinite    Indications    Paroxysmal atrial fibrillation (HCC) [I48.0]  Mitral stenosis s/p Mechanical MVR (Resolved) [I05.0]                 Anticoagulation Episode Summary       INR check location:  Outside Lab    Preferred lab:  --    Send INR reminders to:  --    Comments:  --          Anticoagulation Care Providers       Provider Role Specialty Phone number    Bird Rodriguez D.O. Referring Family Medicine [phone]    Trey Dubon M.D. Referring Thoracic Surgery (Cardiothoracic Vascular Surgery) [phone]    Taj Allen, PharmD Responsible            Anticoagulation Patient Findings        INR is supratherapeutic  Reason(s) for out of range INR today:  Unable to discuss       Called and left VM for patient.    Requested patient to contact the clinic for any s/sx of unusual bleeding, bruising, clotting or any changes to diet or medications. Unless patient reports any changes that would warrant an adjustment to the plan:  Warfarin Plan: Hold ONCE today, then resume current warfarin dosing regimen.     Next INR in 1 week(s).    John Proctor, PharmD

## 2025-05-12 ENCOUNTER — APPOINTMENT (OUTPATIENT)
Dept: CARDIOLOGY | Facility: MEDICAL CENTER | Age: 35
End: 2025-05-12
Attending: INTERNAL MEDICINE
Payer: COMMERCIAL

## 2025-05-16 ENCOUNTER — ANTICOAGULATION MONITORING (OUTPATIENT)
Dept: MEDICAL GROUP | Facility: PHYSICIAN GROUP | Age: 35
End: 2025-05-16
Payer: COMMERCIAL

## 2025-05-16 ENCOUNTER — HOSPITAL ENCOUNTER (OUTPATIENT)
Dept: LAB | Facility: MEDICAL CENTER | Age: 35
End: 2025-05-16
Payer: COMMERCIAL

## 2025-05-16 DIAGNOSIS — I48.0 PAROXYSMAL ATRIAL FIBRILLATION (HCC): Primary | ICD-10-CM

## 2025-05-16 DIAGNOSIS — Z79.01 CHRONIC ANTICOAGULATION: ICD-10-CM

## 2025-05-16 LAB
INR PPP: 4.16 (ref 0.87–1.13)
PROTHROMBIN TIME: 40.5 SEC (ref 12–14.6)

## 2025-05-16 PROCEDURE — 85610 PROTHROMBIN TIME: CPT

## 2025-05-16 PROCEDURE — 36415 COLL VENOUS BLD VENIPUNCTURE: CPT

## 2025-05-16 NOTE — PROGRESS NOTES
Anticoagulation Summary  As of 2025      INR goal:  2.5-3.5   TTR:  59.4% (9.9 y)   INR used for dosin.16 (2025)   Warfarin maintenance plan:  5 mg (5 mg x 1) every Sun, Tue, Thu; 2.5 mg (5 mg x 0.5) all other days   Weekly warfarin total:  25 mg   Plan last modified:  Jono Curry PharmD (2025)   Next INR check:  2025   Target end date:  Indefinite    Indications    Paroxysmal atrial fibrillation (HCC) [I48.0]  Mitral stenosis s/p Mechanical MVR (Resolved) [I05.0]                 Anticoagulation Episode Summary       INR check location:  Outside Lab    Preferred lab:  --    Send INR reminders to:  --    Comments:  --          Anticoagulation Care Providers       Provider Role Specialty Phone number    Bird Rodriguez D.O. Referring Family Medicine [phone]    Trey Dubon M.D. Referring Thoracic Surgery (Cardiothoracic Vascular Surgery) [phone]    Taj Allen, PharmD Responsible            Anticoagulation Patient Findings      Left voicemail message to report a SUPRA therapeutic INR of 4.16.    Will have pt HOLD x 1 dose today and then begin newly reduced regimen of 5 mg Sun/Tues/Thurs and 2.5 mg ROW.  Requested pt contact the clinic for any s/s of unusual bleeding, bruising, clotting or any changes to diet or medication.    FU INR in 2 week(s).    Jono Curry, PharmD, BCACP

## 2025-06-17 DIAGNOSIS — Z79.01 CHRONIC ANTICOAGULATION: ICD-10-CM

## 2025-08-21 ENCOUNTER — HOSPITAL ENCOUNTER (OUTPATIENT)
Dept: LAB | Facility: MEDICAL CENTER | Age: 35
End: 2025-08-21
Payer: COMMERCIAL

## 2025-08-21 ENCOUNTER — ANTICOAGULATION MONITORING (OUTPATIENT)
Dept: VASCULAR LAB | Facility: MEDICAL CENTER | Age: 35
End: 2025-08-21
Payer: COMMERCIAL

## 2025-08-21 DIAGNOSIS — Z79.01 CHRONIC ANTICOAGULATION: ICD-10-CM

## 2025-08-21 DIAGNOSIS — I48.0 PAROXYSMAL ATRIAL FIBRILLATION (HCC): Primary | ICD-10-CM

## 2025-08-21 LAB
INR PPP: 3.73 (ref 0.87–1.13)
PROTHROMBIN TIME: 37.2 SEC (ref 12–14.6)

## 2025-08-21 PROCEDURE — 85610 PROTHROMBIN TIME: CPT

## 2025-08-21 PROCEDURE — 36415 COLL VENOUS BLD VENIPUNCTURE: CPT
